# Patient Record
Sex: MALE | Race: WHITE | NOT HISPANIC OR LATINO | Employment: FULL TIME | URBAN - METROPOLITAN AREA
[De-identification: names, ages, dates, MRNs, and addresses within clinical notes are randomized per-mention and may not be internally consistent; named-entity substitution may affect disease eponyms.]

---

## 2017-07-26 ENCOUNTER — APPOINTMENT (OUTPATIENT)
Dept: LAB | Facility: MEDICAL CENTER | Age: 62
End: 2017-07-26
Payer: COMMERCIAL

## 2017-07-26 ENCOUNTER — TRANSCRIBE ORDERS (OUTPATIENT)
Dept: ADMINISTRATIVE | Facility: HOSPITAL | Age: 62
End: 2017-07-26

## 2017-07-26 DIAGNOSIS — C61 MALIGNANT NEOPLASM OF PROSTATE (HCC): ICD-10-CM

## 2017-07-26 LAB — PSA SERPL-MCNC: 3.4 NG/ML (ref 0–4)

## 2017-07-26 PROCEDURE — 84153 ASSAY OF PSA TOTAL: CPT

## 2017-07-26 PROCEDURE — 36415 COLL VENOUS BLD VENIPUNCTURE: CPT

## 2017-07-28 ENCOUNTER — ALLSCRIPTS OFFICE VISIT (OUTPATIENT)
Dept: OTHER | Facility: OTHER | Age: 62
End: 2017-07-28

## 2017-07-28 ENCOUNTER — GENERIC CONVERSION - ENCOUNTER (OUTPATIENT)
Dept: OTHER | Facility: OTHER | Age: 62
End: 2017-07-28

## 2017-07-28 LAB
BILIRUB UR QL STRIP: NEGATIVE
CLARITY UR: NORMAL
COLOR UR: YELLOW
GLUCOSE (HISTORICAL): NEGATIVE
HGB UR QL STRIP.AUTO: NEGATIVE
KETONES UR STRIP-MCNC: NEGATIVE MG/DL
LEUKOCYTE ESTERASE UR QL STRIP: NEGATIVE
NITRITE UR QL STRIP: NEGATIVE
PH UR STRIP.AUTO: 5 [PH]
PROT UR STRIP-MCNC: NEGATIVE MG/DL
SP GR UR STRIP.AUTO: 1.02
UROBILINOGEN UR QL STRIP.AUTO: 0.2

## 2018-01-15 VITALS
HEIGHT: 74 IN | BODY MASS INDEX: 30.03 KG/M2 | WEIGHT: 234 LBS | DIASTOLIC BLOOD PRESSURE: 84 MMHG | SYSTOLIC BLOOD PRESSURE: 122 MMHG

## 2018-01-17 NOTE — RESULT NOTES
Verified Results  (1) PSA, DIAGNOSTIC (FOLLOW-UP) [id number] 05:06PM Krystle Rosen Order Number: [id number]     Test Name Result Flag Reference   PSA 3 4 ng/mL  0 0-4 0   American [employer] Association Guidelines define biochemical recurrence of prostate cancer as a detectable or rising PSA value post-radical prostatectomy that is greater than or equal to 0 2 ng/mL with a second confirmatory level of greater than or equal to 0 2 ng/mL

## 2018-07-28 DIAGNOSIS — C61 MALIGNANT NEOPLASM OF PROSTATE (HCC): ICD-10-CM

## 2018-07-30 DIAGNOSIS — C61 MALIGNANT NEOPLASM OF PROSTATE (HCC): Primary | ICD-10-CM

## 2018-08-03 ENCOUNTER — OFFICE VISIT (OUTPATIENT)
Dept: UROLOGY | Facility: MEDICAL CENTER | Age: 63
End: 2018-08-03
Payer: COMMERCIAL

## 2018-08-03 VITALS
HEIGHT: 74 IN | WEIGHT: 234 LBS | SYSTOLIC BLOOD PRESSURE: 122 MMHG | DIASTOLIC BLOOD PRESSURE: 78 MMHG | BODY MASS INDEX: 30.03 KG/M2

## 2018-08-03 DIAGNOSIS — Z85.46 HISTORY OF MALIGNANT NEOPLASM OF PROSTATE: ICD-10-CM

## 2018-08-03 DIAGNOSIS — C61 MALIGNANT NEOPLASM OF PROSTATE (HCC): Primary | ICD-10-CM

## 2018-08-03 LAB
SL AMB  POCT GLUCOSE, UA: NEGATIVE
SL AMB LEUKOCYTE ESTERASE,UA: NEGATIVE
SL AMB POCT BILIRUBIN,UA: NEGATIVE
SL AMB POCT BLOOD,UA: NEGATIVE
SL AMB POCT CLARITY,UA: CLEAR
SL AMB POCT COLOR,UA: YELLOW
SL AMB POCT KETONES,UA: NEGATIVE
SL AMB POCT NITRITE,UA: NEGATIVE
SL AMB POCT PH,UA: 6.5
SL AMB POCT SPECIFIC GRAVITY,UA: 1.02
SL AMB POCT URINE PROTEIN: NEGATIVE
SL AMB POCT UROBILINOGEN: 0.2

## 2018-08-03 PROCEDURE — 99213 OFFICE O/P EST LOW 20 MIN: CPT | Performed by: UROLOGY

## 2018-08-03 PROCEDURE — 81003 URINALYSIS AUTO W/O SCOPE: CPT | Performed by: UROLOGY

## 2018-08-03 RX ORDER — OMEGA-3 FATTY ACIDS CAP DELAYED RELEASE 1000 MG 1000 MG
CAPSULE DELAYED RELEASE ORAL DAILY
COMMUNITY
End: 2019-06-13

## 2018-08-03 RX ORDER — LISINOPRIL 5 MG/1
TABLET ORAL DAILY
COMMUNITY
Start: 2018-05-15 | End: 2018-11-09 | Stop reason: ALTCHOICE

## 2018-08-03 RX ORDER — LORATADINE 10 MG/1
10 TABLET ORAL DAILY
COMMUNITY

## 2018-08-03 RX ORDER — IMIQUIMOD 12.5 MG/.25G
CREAM TOPICAL DAILY
COMMUNITY
Start: 2018-07-09 | End: 2018-11-09 | Stop reason: ALTCHOICE

## 2018-08-03 RX ORDER — LORATADINE 10 MG
TABLET ORAL DAILY
COMMUNITY
Start: 2018-05-15 | End: 2021-06-09

## 2018-08-03 RX ORDER — EVOLOCUMAB 140 MG/ML
INJECTION, SOLUTION SUBCUTANEOUS
COMMUNITY
Start: 2018-08-02 | End: 2019-10-10 | Stop reason: SDUPTHER

## 2018-08-03 RX ORDER — DOXAZOSIN 8 MG/1
TABLET ORAL DAILY
COMMUNITY
Start: 2018-05-20 | End: 2018-08-24 | Stop reason: SDUPTHER

## 2018-08-03 RX ORDER — TRAMADOL HYDROCHLORIDE 50 MG/1
TABLET ORAL AS NEEDED
COMMUNITY
Start: 2017-10-19 | End: 2018-09-07 | Stop reason: SDUPTHER

## 2018-08-03 RX ORDER — BIOTIN 1 MG
TABLET ORAL DAILY
COMMUNITY
End: 2019-10-24

## 2018-08-03 RX ORDER — CLOPIDOGREL BISULFATE 75 MG/1
TABLET ORAL DAILY
COMMUNITY
Start: 2018-05-15 | End: 2018-11-09 | Stop reason: ALTCHOICE

## 2018-08-03 NOTE — PROGRESS NOTES
Assessment/Plan:    Malignant neoplasm of prostate ([email or web address] 75 )   Since the patient has never been treated, we must assume that his prostate cancer still  present although it is inactive  We will continue annual monitoring of his symptoms for recurrent cancer as well as the need for alpha blockers  Diagnoses and all orders for this visit:    Malignant neoplasm of prostate ([email or web address] 75 )  -     PSA, Total Screen; Future    History of malignant neoplasm of prostate  -     POCT urine dip auto non-scope    Other orders  -     [employer] ASPIRIN ADULT LOW DOSE 81 MG chewable tablet; daily  -     Cholecalciferol (VITAMIN D3) 1000 units CAPS; Take by mouth daily  -     clopidogrel (PLAVIX) 75 mg tablet; daily  -     doxazosin (CARDURA) 8 MG tablet; daily  -     REPATHA SURECLICK 817 MG/ML SOAJ; Twice a month  -     Omega-3 Fatty Acids (FISH OIL) 1000 MG CPDR; Take by mouth daily  -     imiquimod (ALDARA) 5 % cream; daily  -     lisinopril (ZESTRIL) 5 mg tablet; daily  -     loratadine (CLARITIN) 10 mg tablet; Take 10 mg by mouth daily  -     metoprolol tartrate (LOPRESSOR) 25 mg tablet; daily  -     traMADol (ULTRAM) 50 mg tablet; as needed          Subjective:      Patient ID: Mikie Chen is a 70 y o  male  HPI   Prostate cancer: The patient has been on active surveillance since 2010  A prostate biopsy then showed a [place] 6 carcinoma in 10% of 1 core  He had prostate biopsies twice after that, an all samples were negative  His current PSA is 3 7  His PSA has varied from a low of 3 0 in 2014 to a high of 4 8 in 2012  There has been no trend either up or down  He notes nocturia x 1  He denies other significant urinary symptoms  He denies gross hematuria, urinary tract infections or incontinence  He is taking doxazosin for his symptoms  The doxazocin does not seem to be working as consistently  If he misses a dose, he notes a dramatic increase in  sx        The following portions of the patient's history were reviewed and updated as appropriate: allergies, current medications, past family history, past medical history, past social history, past surgical history and problem list     Review of Systems   Constitutional: Negative for activity change and fatigue  Respiratory: Negative for shortness of breath and wheezing  Hx of pulmonary emboli following hernia surgery  Cardiovascular: Negative for chest pain  MI in 2014  Taking clopidogrel and ASA  Gastrointestinal: Negative for abdominal pain  Genitourinary: Negative for difficulty urinating, dysuria, frequency, hematuria and urgency  Musculoskeletal: Negative for back pain and gait problem  Skin: Negative  Allergic/Immunologic: Negative  Neurological: Negative  Psychiatric/Behavioral: Negative  Objective:      /78 (BP Location: Left arm, Patient Position: Sitting, Cuff Size: Standard)   Ht 6' 2" (1 88 m)   Wt 106 kg (234 lb)   BMI 30 04 kg/m²          Physical Exam   Constitutional: He is oriented to person, place, and time  He appears well-developed and well-nourished  HENT:   Head: Normocephalic and atraumatic  Eyes: EOM are normal    Neck: Normal range of motion  Neck supple  Pulmonary/Chest: Effort normal    Genitourinary: Rectum normal    Genitourinary Comments: The prostate is 30 gm, firm, smooth, non-tender  Musculoskeletal: Normal range of motion  Neurological: He is alert and oriented to person, place, and time  Skin: Skin is warm and dry  Psychiatric: He has a normal mood and affect   His behavior is normal  Judgment and thought content normal

## 2018-08-03 NOTE — PROGRESS NOTES
IPSS Questionnaire (AUA-7): Over the past month    1)  How often have you had a sensation of not emptying your bladder completely after you finish urinating? 0 - Not at all   2)  How often have you had to urinate again less than two hours after you finished urinating? 0 - Not at all   3)  How often have you found you stopped and started again several times when you urinated? 0 - Not at all   4) How difficult have you found it to postpone urination? 1 - Less than 1 time in 5   5) How often have you had a weak urinary stream?  0 - Not at all   6) How often have you had to push or strain to begin urination? 0 - Not at all   7) How many times did you most typically get up to urinate from the time you went to bed until the time you got up in the morning? 1 - 1 time   Total Score:  2     QOL: Pleased

## 2018-08-03 NOTE — LETTER
August 4, 2018     Barry Esparza MD  [address]  [place] [address]    Patient: Yaya Rodriguez   YOB: 1955   Date of Visit: 8/3/2018       Dear Dr Kerri Wilkinson:    Thank you for referring Yaya Rodriguez to me for evaluation  Below are my notes for this consultation  If you have questions, please do not hesitate to call me  I look forward to following your patient along with you  Sincerely,        Colton Rodriguez MD        CC: No Recipients  Colton Rodriguez MD  8/4/2018 11:22 AM  Sign at close encounter  Assessment/Plan:    Malignant neoplasm of prostate [facility])   Since the patient has never been treated, we must assume that his prostate cancer still  present although it is inactive  We will continue annual monitoring of his symptoms for recurrent cancer as well as the need for alpha blockers  Diagnoses and all orders for this visit:    Malignant neoplasm of prostate ([facility] [address] )  -     PSA, Total Screen; Future    History of malignant neoplasm of prostate  -     POCT urine dip auto non-scope    Other orders  -     [employer]S ASPIRIN ADULT LOW DOSE 81 MG chewable tablet; daily  -     Cholecalciferol (VITAMIN D3) 1000 units CAPS; Take by mouth daily  -     clopidogrel (PLAVIX) 75 mg tablet; daily  -     doxazosin (CARDURA) 8 MG tablet; daily  -     REPATHA SURECLICK 034 MG/ML SOAJ; Twice a month  -     Omega-3 Fatty Acids (FISH OIL) 1000 MG CPDR; Take by mouth daily  -     imiquimod (ALDARA) 5 % cream; daily  -     lisinopril (ZESTRIL) 5 mg tablet; daily  -     loratadine (CLARITIN) 10 mg tablet; Take 10 mg by mouth daily  -     metoprolol tartrate (LOPRESSOR) 25 mg tablet; daily  -     traMADol (ULTRAM) 50 mg tablet; as needed          Subjective:      Patient ID: Yaya Rodriguez is a 70 y o  male  HPI   Prostate cancer: The patient has been on active surveillance since 2010  A prostate biopsy then showed a [place] 6 carcinoma in 10% of 1 core     He had prostate biopsies twice after that, an all samples were negative  His current PSA is 3 7  His PSA has varied from a low of 3 0 in 2014 to a high of 4 8 in 2012  There has been no trend either up or down  He notes nocturia x 1  He denies other significant urinary symptoms  He denies gross hematuria, urinary tract infections or incontinence  He is taking doxazosin for his symptoms  The doxazocin does not seem to be working as consistently  If he misses a dose, he notes a dramatic increase in  sx  The following portions of the patient's history were reviewed and updated as appropriate: allergies, current medications, past family history, past medical history, past social history, past surgical history and problem list     Review of Systems   Constitutional: Negative for activity change and fatigue  Respiratory: Negative for shortness of breath and wheezing  Hx of pulmonary emboli following hernia surgery  Cardiovascular: Negative for chest pain  MI in 2014  Taking clopidogrel and ASA  Gastrointestinal: Negative for abdominal pain  Genitourinary: Negative for difficulty urinating, dysuria, frequency, hematuria and urgency  Musculoskeletal: Negative for back pain and gait problem  Skin: Negative  Allergic/Immunologic: Negative  Neurological: Negative  Psychiatric/Behavioral: Negative  Objective:      /78 (BP Location: Left arm, Patient Position: Sitting, Cuff Size: Standard)   Ht 6' 2" (1 88 m)   Wt 106 kg (234 lb)   BMI 30 04 kg/m²           Physical Exam   Constitutional: He is oriented to person, place, and time  He appears well-developed and well-nourished  HENT:   Head: Normocephalic and atraumatic  Eyes: EOM are normal    Neck: Normal range of motion  Neck supple  Pulmonary/Chest: Effort normal    Genitourinary: Rectum normal    Genitourinary Comments: The prostate is 30 gm, firm, smooth, non-tender     Musculoskeletal: Normal range of motion  Neurological: He is alert and oriented to person, place, and time  Skin: Skin is warm and dry  Psychiatric: He has a normal mood and affect   His behavior is normal  Judgment and thought content normal

## 2018-08-04 PROBLEM — C61 MALIGNANT NEOPLASM OF PROSTATE (HCC): Status: ACTIVE | Noted: 2018-08-04

## 2018-08-04 NOTE — ASSESSMENT & PLAN NOTE
Since the patient has never been treated, we must assume that his prostate cancer still  present although it is inactive  We will continue annual monitoring of his symptoms for recurrent cancer as well as the need for alpha blockers

## 2018-08-24 DIAGNOSIS — N40.0 PROSTATE ENLARGEMENT: Primary | ICD-10-CM

## 2018-08-24 RX ORDER — DOXAZOSIN 8 MG/1
8 TABLET ORAL DAILY
Qty: 8 TABLET | Refills: 0 | Status: SHIPPED | OUTPATIENT
Start: 2018-08-24 | End: 2018-09-07 | Stop reason: SDUPTHER

## 2018-08-24 NOTE — TELEPHONE ENCOUNTER
Patient states he has run out of medication and he would like medication refills for Doxazosin 8 mg Patient states he needs, it less 8 tables since his medication are being ship by Mailed order and it will take a week for him to received medication

## 2018-09-07 ENCOUNTER — OFFICE VISIT (OUTPATIENT)
Dept: FAMILY MEDICINE CLINIC | Facility: CLINIC | Age: 63
End: 2018-09-07
Payer: COMMERCIAL

## 2018-09-07 VITALS
HEIGHT: 74 IN | DIASTOLIC BLOOD PRESSURE: 62 MMHG | HEART RATE: 74 BPM | WEIGHT: 233 LBS | TEMPERATURE: 98.9 F | SYSTOLIC BLOOD PRESSURE: 118 MMHG | BODY MASS INDEX: 29.9 KG/M2 | RESPIRATION RATE: 18 BRPM | OXYGEN SATURATION: 94 %

## 2018-09-07 DIAGNOSIS — Z98.61 STATUS POST PERCUTANEOUS TRANSLUMINAL CORONARY ANGIOPLASTY: ICD-10-CM

## 2018-09-07 DIAGNOSIS — I21.02 ACUTE ST ELEVATION MYOCARDIAL INFARCTION (STEMI) INVOLVING LEFT ANTERIOR DESCENDING (LAD) CORONARY ARTERY (HCC): Primary | ICD-10-CM

## 2018-09-07 DIAGNOSIS — C61 ADENOCARCINOMA OF PROSTATE (HCC): ICD-10-CM

## 2018-09-07 DIAGNOSIS — M12.9 ARTHROPATHY: ICD-10-CM

## 2018-09-07 DIAGNOSIS — N40.0 PROSTATE ENLARGEMENT: ICD-10-CM

## 2018-09-07 DIAGNOSIS — M1A.0690 IDIOPATHIC CHRONIC GOUT OF KNEE WITHOUT TOPHUS, UNSPECIFIED LATERALITY: ICD-10-CM

## 2018-09-07 DIAGNOSIS — Z86.711 HX PULMONARY EMBOLISM: ICD-10-CM

## 2018-09-07 DIAGNOSIS — R73.09 ABNORMAL GLUCOSE LEVEL: ICD-10-CM

## 2018-09-07 DIAGNOSIS — E78.5 HYPERLIPIDEMIA, UNSPECIFIED HYPERLIPIDEMIA TYPE: ICD-10-CM

## 2018-09-07 DIAGNOSIS — I26.99 PULMONARY EMBOLISM WITH INFARCTION (HCC): ICD-10-CM

## 2018-09-07 DIAGNOSIS — I10 ESSENTIAL HYPERTENSION: ICD-10-CM

## 2018-09-07 DIAGNOSIS — G89.29 OTHER CHRONIC PAIN: ICD-10-CM

## 2018-09-07 DIAGNOSIS — I10 HYPERTENSION, UNSPECIFIED TYPE: ICD-10-CM

## 2018-09-07 PROCEDURE — 99214 OFFICE O/P EST MOD 30 MIN: CPT | Performed by: INTERNAL MEDICINE

## 2018-09-07 PROCEDURE — 3074F SYST BP LT 130 MM HG: CPT | Performed by: INTERNAL MEDICINE

## 2018-09-07 PROCEDURE — 3078F DIAST BP <80 MM HG: CPT | Performed by: INTERNAL MEDICINE

## 2018-09-07 PROCEDURE — 3008F BODY MASS INDEX DOCD: CPT | Performed by: INTERNAL MEDICINE

## 2018-09-07 RX ORDER — TRAMADOL HYDROCHLORIDE 50 MG/1
50 TABLET ORAL EVERY 8 HOURS PRN
Qty: 30 TABLET | Refills: 1 | Status: SHIPPED | OUTPATIENT
Start: 2018-09-07 | End: 2018-10-07

## 2018-09-07 RX ORDER — DOXAZOSIN 8 MG/1
8 TABLET ORAL DAILY
Qty: 90 TABLET | Refills: 3 | Status: SHIPPED | OUTPATIENT
Start: 2018-09-07 | End: 2019-05-09 | Stop reason: SINTOL

## 2018-09-07 RX ORDER — AMPICILLIN TRIHYDRATE 250 MG
CAPSULE ORAL
COMMUNITY
End: 2019-06-13

## 2018-09-07 NOTE — PROGRESS NOTES
Assessment/Plan:      Diet reviewed  Lifestyle modifications reviewed  Medications reviewed and ordered  Laboratory tests and studies reviewed and ordered  All patient's questions answered to patient satisfaction  Diagnoses and all orders for this visit:    Acute ST elevation myocardial infarction (STEMI) involving left anterior descending (LAD) coronary artery (HCC)  -     CBC and differential; Future  -     Comprehensive metabolic panel; Future  -     Lipid Panel with Direct LDL reflex; Future  -     Uric acid; Future  -     C-reactive protein; Future  -     Cyclic citrul peptide antibody, IgG; Future    Hyperlipidemia, unspecified hyperlipidemia type  -     CBC and differential; Future  -     Comprehensive metabolic panel; Future  -     Lipid Panel with Direct LDL reflex; Future  -     Uric acid; Future  -     C-reactive protein; Future  -     Cyclic citrul peptide antibody, IgG; Future    Hypertension, unspecified type    Prostate enlargement  -     doxazosin (CARDURA) 8 MG tablet; Take 1 tablet (8 mg total) by mouth daily    Other chronic pain  -     traMADol (ULTRAM) 50 mg tablet; Take 1 tablet (50 mg total) by mouth every 8 (eight) hours as needed for severe pain for up to 30 days  -     CBC and differential; Future  -     Comprehensive metabolic panel; Future  -     Lipid Panel with Direct LDL reflex; Future  -     Uric acid; Future  -     C-reactive protein; Future  -     Cyclic citrul peptide antibody, IgG; Future    Hx pulmonary embolism  -     CBC and differential; Future  -     Comprehensive metabolic panel; Future  -     Lipid Panel with Direct LDL reflex; Future  -     Uric acid; Future  -     C-reactive protein; Future  -     Cyclic citrul peptide antibody, IgG; Future    Essential hypertension    Arthropathy  -     CBC and differential; Future  -     Comprehensive metabolic panel; Future  -     Lipid Panel with Direct LDL reflex; Future  -     Uric acid;  Future  -     C-reactive protein; Future  -     Cyclic citrul peptide antibody, IgG; Future    Status post percutaneous transluminal coronary angioplasty    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, unspecified laterality    Abnormal glucose level    Pulmonary embolism with infarction ([facility] [address] )    Adenocarcinoma of prostate ([facility] [address] )    Other orders  -     Red Yeast Rice 600 MG CAPS; Take by mouth        Subjective:      Patient ID: Cinthya Willard is a 70 y o  male  HPI  This 64-year-old male with a past history of interval my card infarction and LAD stent, hyperlipidemia with past history of statin myalgias requiring switched to repatha with excellent response, 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 possibly secondary to inflammatory arthritis less likely gout well controlled in the recent past, remote history of pulmonary embolism not on full anticoagulation, and history of low-grade adenocarcinoma of the prostate under observation without therapy by Urology with stable PSA      Current Outpatient Prescriptions:     Cholecalciferol (VITAMIN D3) 1000 units CAPS, Take by mouth daily, Disp: , Rfl:     clopidogrel (PLAVIX) 75 mg tablet, daily, Disp: , Rfl:     CVS ASPIRIN ADULT LOW DOSE 81 MG chewable tablet, daily, Disp: , Rfl:     lisinopril (ZESTRIL) 5 mg tablet, daily, Disp: , Rfl:     loratadine (CLARITIN) 10 mg tablet, Take 10 mg by mouth daily, Disp: , Rfl:     metoprolol tartrate (LOPRESSOR) 25 mg tablet, daily, Disp: , Rfl:     Omega-3 Fatty Acids (FISH OIL) 1000 MG CPDR, Take by mouth daily, Disp: , Rfl:     Red Yeast Rice 600 MG CAPS, Take by mouth, Disp: , Rfl:     REPATHA SURECLICK 241 MG/ML SOAJ, Twice a month, Disp: , Rfl:     doxazosin (CARDURA) 8 MG tablet, Take 1 tablet (8 mg total) by mouth daily, Disp: 90 tablet, Rfl: 3    imiquimod (ALDARA) 5 % cream, daily, Disp: , Rfl:     traMADol (ULTRAM) 50 mg tablet, Take 1 tablet (50 mg total) by mouth every 8 (eight) hours as needed for severe pain for up to 30 days, Disp: 30 tablet, Rfl: 1    The following portions of the patient's history were reviewed and updated as appropriate: allergies, current medications, past family history, past medical history, past social history, past surgical history and problem list     Review of Systems      Family History   Problem Relation Age of Onset    Cancer Family         Bladder cancer    Diabetes Family     Heart attack Family     Hypertension Family        Past Medical History:   Diagnosis Date    BPH with urinary obstruction     Elevated PSA     Erectile dysfunction     Gout     Hypercholesteremia     Malignant [facility] prostate [facility] [address] )     Pulmonary embolism ([facility] )     Right inguinal hernia        Past Surgical History:   Procedure Laterality Date    PROSTATE BIOPSY Bilateral 2011    TONSILLECTOMY         Social History     Social History    Marital status: /Civil Union     Spouse name: N/A    Number of children: N/A    Years of education: N/A     Social History Main Topics    Smoking status: Former Smoker     Types: Cigarettes     Quit date: 1971    Smokeless tobacco: Never Used    Alcohol use 0 6 oz/week     1 Cans of beer per week    Drug use: No    Sexual activity: Yes     Partners: Female     Birth control/ protection: None     Other Topics Concern    None     Social History Narrative    None       Allergies   Allergen Reactions    No Active Allergies          Objective:      /62   Pulse 74   Temp 98 9 °F (37 2 °C)   Resp 18   Ht 6' 2" (1 88 m)   Wt 106 kg (233 lb)   SpO2 94%   BMI 29 92 kg/m²        Physical Exam

## 2018-09-24 NOTE — TELEPHONE ENCOUNTER
Jaz Garrett requested  Inject 1 ml q 2 weeks   30 day with 11 Rf      [phone] Phone  Direct extention: [phone]

## 2018-09-26 NOTE — TELEPHONE ENCOUNTER
Medication called in per Dr Carine Harley instructions  LM for Pt letting him know medication was renewed

## 2018-10-19 LAB
ALBUMIN SERPL-MCNC: 4.8 G/DL (ref 3.6–5.1)
ALBUMIN/GLOB SERPL: 1.8 (CALC) (ref 1–2.5)
ALP SERPL-CCNC: 61 U/L (ref 40–115)
ALT SERPL-CCNC: 13 U/L (ref 9–46)
AST SERPL-CCNC: 17 U/L (ref 10–35)
BASOPHILS # BLD AUTO: 68 CELLS/UL (ref 0–200)
BASOPHILS NFR BLD AUTO: 0.8 %
BILIRUB SERPL-MCNC: 0.7 MG/DL (ref 0.2–1.2)
BUN SERPL-MCNC: 17 MG/DL (ref 7–25)
BUN/CREAT SERPL: ABNORMAL (CALC) (ref 6–22)
CALCIUM SERPL-MCNC: 9.9 MG/DL (ref 8.6–10.3)
CCP IGG SERPL-ACNC: <16 UNITS
CHLORIDE SERPL-SCNC: 105 MMOL/L (ref 98–110)
CHOLEST SERPL-MCNC: 139 MG/DL
CHOLEST/HDLC SERPL: 3.1 (CALC)
CO2 SERPL-SCNC: 27 MMOL/L (ref 20–32)
CREAT SERPL-MCNC: 0.97 MG/DL (ref 0.7–1.25)
CRP SERPL-MCNC: 2.3 MG/L
EOSINOPHIL # BLD AUTO: 136 CELLS/UL (ref 15–500)
EOSINOPHIL NFR BLD AUTO: 1.6 %
ERYTHROCYTE [DISTWIDTH] IN BLOOD BY AUTOMATED COUNT: 13.1 % (ref 11–15)
GLOBULIN SER CALC-MCNC: 2.6 G/DL (CALC) (ref 1.9–3.7)
GLUCOSE SERPL-MCNC: 104 MG/DL (ref 65–99)
HCT VFR BLD AUTO: 44.2 % (ref 38.5–50)
HDLC SERPL-MCNC: 45 MG/DL
HGB BLD-MCNC: 15 G/DL (ref 13.2–17.1)
LDLC SERPL CALC-MCNC: 68 MG/DL (CALC)
LYMPHOCYTES # BLD AUTO: 2678 CELLS/UL (ref 850–3900)
LYMPHOCYTES NFR BLD AUTO: 31.5 %
MCH RBC QN AUTO: 30.5 PG (ref 27–33)
MCHC RBC AUTO-ENTMCNC: 33.9 G/DL (ref 32–36)
MCV RBC AUTO: 89.8 FL (ref 80–100)
MONOCYTES # BLD AUTO: 748 CELLS/UL (ref 200–950)
MONOCYTES NFR BLD AUTO: 8.8 %
NEUTROPHILS # BLD AUTO: 4871 CELLS/UL (ref 1500–7800)
NEUTROPHILS NFR BLD AUTO: 57.3 %
NONHDLC SERPL-MCNC: 94 MG/DL (CALC)
PLATELET # BLD AUTO: 283 THOUSAND/UL (ref 140–400)
PMV BLD REES-ECKER: 11.1 FL (ref 7.5–12.5)
POTASSIUM SERPL-SCNC: 4.5 MMOL/L (ref 3.5–5.3)
PROT SERPL-MCNC: 7.4 G/DL (ref 6.1–8.1)
RBC # BLD AUTO: 4.92 MILLION/UL (ref 4.2–5.8)
SL AMB EGFR AFRICAN AMERICAN: 95 ML/MIN/1.73M2
SL AMB EGFR NON AFRICAN AMERICAN: 82 ML/MIN/1.73M2
SODIUM SERPL-SCNC: 139 MMOL/L (ref 135–146)
TRIGL SERPL-MCNC: 193 MG/DL
URATE SERPL-MCNC: 7.1 MG/DL (ref 4–8)
WBC # BLD AUTO: 8.5 THOUSAND/UL (ref 3.8–10.8)

## 2018-11-09 ENCOUNTER — OFFICE VISIT (OUTPATIENT)
Dept: FAMILY MEDICINE CLINIC | Facility: CLINIC | Age: 63
End: 2018-11-09
Payer: COMMERCIAL

## 2018-11-09 VITALS
BODY MASS INDEX: 31.59 KG/M2 | WEIGHT: 233.2 LBS | OXYGEN SATURATION: 98 % | DIASTOLIC BLOOD PRESSURE: 74 MMHG | SYSTOLIC BLOOD PRESSURE: 118 MMHG | HEIGHT: 72 IN | RESPIRATION RATE: 18 BRPM | TEMPERATURE: 96.4 F | HEART RATE: 84 BPM

## 2018-11-09 DIAGNOSIS — G72.2 TOXIC MYOPATHY: ICD-10-CM

## 2018-11-09 DIAGNOSIS — Z23 ENCOUNTER FOR IMMUNIZATION: ICD-10-CM

## 2018-11-09 DIAGNOSIS — I10 ESSENTIAL HYPERTENSION: ICD-10-CM

## 2018-11-09 DIAGNOSIS — K65.4 SCLEROSING MESENTERITIS (HCC): ICD-10-CM

## 2018-11-09 DIAGNOSIS — I25.10 CORONARY ARTERY DISEASE INVOLVING NATIVE CORONARY ARTERY OF NATIVE HEART WITHOUT ANGINA PECTORIS: ICD-10-CM

## 2018-11-09 DIAGNOSIS — R73.09 ABNORMAL GLUCOSE LEVEL: ICD-10-CM

## 2018-11-09 DIAGNOSIS — I26.99 PULMONARY EMBOLISM WITH INFARCTION (HCC): ICD-10-CM

## 2018-11-09 DIAGNOSIS — I21.02 ACUTE ST ELEVATION MYOCARDIAL INFARCTION (STEMI) INVOLVING LEFT ANTERIOR DESCENDING (LAD) CORONARY ARTERY (HCC): Primary | ICD-10-CM

## 2018-11-09 DIAGNOSIS — C61 ADENOCARCINOMA OF PROSTATE (HCC): ICD-10-CM

## 2018-11-09 DIAGNOSIS — M12.9 ARTHROPATHY: ICD-10-CM

## 2018-11-09 DIAGNOSIS — Z86.711 HX PULMONARY EMBOLISM: ICD-10-CM

## 2018-11-09 DIAGNOSIS — E78.5 HYPERLIPIDEMIA, UNSPECIFIED HYPERLIPIDEMIA TYPE: ICD-10-CM

## 2018-11-09 DIAGNOSIS — Z98.61 STATUS POST PERCUTANEOUS TRANSLUMINAL CORONARY ANGIOPLASTY: ICD-10-CM

## 2018-11-09 DIAGNOSIS — G89.29 OTHER CHRONIC PAIN: ICD-10-CM

## 2018-11-09 PROCEDURE — 90686 IIV4 VACC NO PRSV 0.5 ML IM: CPT

## 2018-11-09 PROCEDURE — 1036F TOBACCO NON-USER: CPT | Performed by: INTERNAL MEDICINE

## 2018-11-09 PROCEDURE — 3074F SYST BP LT 130 MM HG: CPT | Performed by: INTERNAL MEDICINE

## 2018-11-09 PROCEDURE — 90471 IMMUNIZATION ADMIN: CPT

## 2018-11-09 PROCEDURE — 3078F DIAST BP <80 MM HG: CPT | Performed by: INTERNAL MEDICINE

## 2018-11-09 PROCEDURE — 99214 OFFICE O/P EST MOD 30 MIN: CPT | Performed by: INTERNAL MEDICINE

## 2018-11-09 PROCEDURE — 3008F BODY MASS INDEX DOCD: CPT | Performed by: INTERNAL MEDICINE

## 2018-11-09 RX ORDER — CLOPIDOGREL BISULFATE 75 MG/1
75 TABLET ORAL
COMMUNITY
Start: 2018-10-09 | End: 2021-05-24 | Stop reason: SDUPTHER

## 2018-11-09 RX ORDER — TRAMADOL HYDROCHLORIDE 50 MG/1
2 TABLET ORAL AS NEEDED
COMMUNITY
Start: 2016-09-08 | End: 2020-07-28 | Stop reason: SDUPTHER

## 2018-11-09 RX ORDER — LISINOPRIL 5 MG/1
2.5 TABLET ORAL DAILY
COMMUNITY
Start: 2018-10-09

## 2018-11-09 RX ORDER — METOPROLOL SUCCINATE 25 MG/1
25 TABLET, EXTENDED RELEASE ORAL EVERY EVENING
COMMUNITY
Start: 2018-10-09

## 2018-11-09 RX ORDER — ICOSAPENT ETHYL 1000 MG/1
2 CAPSULE ORAL 2 TIMES DAILY
Qty: 360 CAPSULE | Refills: 3 | Status: SHIPPED | OUTPATIENT
Start: 2018-11-09 | End: 2019-09-10 | Stop reason: SDUPTHER

## 2018-11-09 NOTE — PROGRESS NOTES
Assessment/Plan:  Patient overall were all is doing well can work get this job continuing daily  He had a stress test with excellent results  He has no significant wall motion abnormality as residual of his past MI with LAD stent  Cholesterol panel reveals a triglycerides of 193 and a calculated LDL of 68  He will continue the rip at the 140 mg every 2 weeks  And increase the red yeast rice to 1200 mg twice daily and start the septa 2000 mg twice daily  Carcinoma of the prostate low-grade appears controlled with a PSA of 3 7 and is followed by Urology  Diet reviewed  Lifestyle modifications reviewed  Medications reviewed and ordered  Laboratory tests and studies reviewed and ordered  All patient's questions answered to patient satisfaction  Diagnoses and all orders for this visit:    Acute ST elevation myocardial infarction (STEMI) involving left anterior descending (LAD) coronary artery (HCC)  -     Icosapent Ethyl (VASCEPA) 1 g CAPS; Take 2 capsules (2 g total) by mouth 2 (two) times a day  -     CBC and differential; Future  -     Comprehensive metabolic panel; Future  -     Lipid Panel with Direct LDL reflex; Future    Hyperlipidemia, unspecified hyperlipidemia type  -     Icosapent Ethyl (VASCEPA) 1 g CAPS; Take 2 capsules (2 g total) by mouth 2 (two) times a day  -     CBC and differential; Future  -     Comprehensive metabolic panel; Future  -     Lipid Panel with Direct LDL reflex; Future    Other chronic pain    Hx pulmonary embolism    Essential hypertension    Coronary artery disease involving native coronary artery of native heart without angina pectoris  -     Icosapent Ethyl (VASCEPA) 1 g CAPS; Take 2 capsules (2 g total) by mouth 2 (two) times a day  -     CBC and differential; Future  -     Comprehensive metabolic panel; Future  -     Lipid Panel with Direct LDL reflex;  Future    Pulmonary embolism with infarction [facility])    Arthropathy    Toxic myopathy    Adenocarcinoma of prostate ([facility] )    Abnormal glucose level    Sclerosing mesenteritis ([facility] )    Status post percutaneous transluminal coronary angioplasty    Encounter for immunization  -     influenza vaccine, [id number], quadrivalent, 0 5 mL, preservative-free (SYRINGE, SINGLE-DOSE VIAL), for adult and pediatric patients 3 yr+ (AFLURIA, FLUARIX, FLULAVAL, FLUZONE)    Other orders  -     metoprolol succinate (TOPROL-XL) 25 mg 24 hr tablet; Take 25 mg by mouth  -     traMADol (ULTRAM) 50 mg tablet; Take 2 tablets by mouth every 8 (eight) hours as needed  -     clopidogrel (PLAVIX) 75 mg tablet; Take 75 mg by mouth  -     lisinopril (ZESTRIL) 5 mg tablet; Take 5 mg by mouth        Subjective:      Patient ID: Atiya Bertrand is a 70 y o  male  HPI  This 57-year-old male is seen for the following:    Past acute ST segment elevation myocardial infarction involving left anterior descending coronary artery with stenting  Patient has had an echo that demonstrates ejection fraction 60% with no significant residual wall motion abnormality  Stress test done last summer demonstrated no evidence of ischemia  Hyperlipidemia patient is on rip at the 140 mg every 2 weeks red yeast rice 600 twice daily and over-the-counter fish oil a 1000 mg twice daily  Patient would like to increase his red yeast rice 2-1200 mg twice daily and take prescription the septa fish oil 2000 twice daily to optimize his cholesterol panel  Current diagnosis of low-grade adenocarcinoma of the prostate which is being monitored his last PSA was 3 7 and is considered a stable condition  Pre diabetes patient's fasting blood sugar is 104  He controls this with diet  The patient will have a flu shot today      Current Outpatient Prescriptions:     Cholecalciferol (VITAMIN D3) 1000 units CAPS, Take by mouth daily, Disp: , Rfl:     clopidogrel (PLAVIX) 75 mg tablet, Take 75 mg by mouth, Disp: , Rfl:     [facility] ASPIRIN ADULT LOW DOSE 81 MG chewable tablet, daily, Disp: , Rfl:     doxazosin (CARDURA) 8 MG tablet, Take 1 tablet (8 mg total) by mouth daily, Disp: 90 tablet, Rfl: 3    lisinopril (ZESTRIL) 5 mg tablet, Take 5 mg by mouth, Disp: , Rfl:     loratadine (CLARITIN) 10 mg tablet, Take 10 mg by mouth daily, Disp: , Rfl:     metoprolol succinate (TOPROL-XL) 25 mg 24 hr tablet, Take 25 mg by mouth, Disp: , Rfl:     Omega-3 Fatty Acids (FISH OIL) 1000 MG CPDR, Take by mouth daily, Disp: , Rfl:     Red Yeast Rice 600 MG CAPS, Take by mouth, Disp: , Rfl:     REPATHA SURECLICK 807 MG/ML SOAJ, Twice a month, Disp: , Rfl:     traMADol (ULTRAM) 50 mg tablet, Take 2 tablets by mouth every 8 (eight) hours as needed, Disp: , Rfl:     Icosapent Ethyl (VASCEPA) 1 g CAPS, Take 2 capsules (2 g total) by mouth 2 (two) times a day, Disp: 360 capsule, Rfl: 3    The following portions of the patient's history were reviewed and updated as appropriate: allergies, current medications, past family history, past medical history, past social history, past surgical history and problem list     Review of Systems   Constitutional: Negative for appetite change, fatigue, fever and unexpected weight change  HENT: Negative for rhinorrhea, sinus pain, sinus pressure, sneezing and sore throat  Eyes: Negative for visual disturbance  Respiratory: Negative for cough, chest tightness, shortness of breath and wheezing  Cardiovascular: Negative for chest pain, palpitations and leg swelling  Gastrointestinal: Negative for abdominal distention, abdominal pain, blood in stool, constipation, diarrhea, nausea and vomiting  Endocrine: Negative for polydipsia and polyuria  Genitourinary: Negative for decreased urine volume, difficulty urinating, dysuria, hematuria and urgency  Musculoskeletal: Negative for arthralgias, back pain, joint swelling and neck pain  Skin: Negative for rash  Allergic/Immunologic: Negative for environmental allergies     Neurological: Negative for tremors, weakness, light-headedness, numbness and headaches  Hematological: Does not bruise/bleed easily  Psychiatric/Behavioral: Negative for agitation, behavioral problems, confusion and dysphoric mood  The patient is not nervous/anxious  Family History   Problem Relation Age of Onset   Brandi Nine Cancer Family         Bladder cancer    Diabetes Family     Heart attack Family     Hypertension Family        Past Medical History:   Diagnosis Date    BPH with urinary obstruction     Elevated PSA     Erectile dysfunction     Gout     Hypercholesteremia     Malignant neoplasm prostate ([address] )     [facility] embol[facility]     Right inguinal hernia        Past Surgical History:   Procedure Laterality Date    PROSTATE BIOPSY Bilateral 2011    TONSILLECTOMY         Social History     Social History    Marital status: /Civil Union     Spouse name: N/A    Number of children: N/A    Years of education: N/A     Occupational History    manager      Social History Main Topics    Smoking status: Former Smoker     Types: Cigarettes     Quit date: 1971    Smokeless tobacco: Never Used    Alcohol use 0 6 oz/week     1 Cans of beer per week      Comment: rarely    Drug use: No    Sexual activity: Yes     Partners: Female     Birth control/ protection: None     Other Topics Concern    None     Social History Narrative    None       Allergies   Allergen Reactions    No Active Allergies          Objective:      /74 (BP Location: Right arm, Patient Position: Sitting, Cuff Size: Large)   Pulse 84   Temp (!) 96 4 °F (35 8 °C) (Tympanic)   Resp 18   Ht 6' (1 829 m)   Wt 106 kg (233 lb 3 2 oz)   SpO2 98%   BMI 31 63 kg/m²        Physical Exam   Constitutional: He is oriented to person, place, and time  He appears well-developed and well-nourished  No distress  HENT:   Head: Normocephalic and atraumatic  Nose: Nose normal    Mouth/Throat: Oropharynx is clear and moist  No oropharyngeal exudate     Eyes: Pupils are equal, round, and reactive to light  Conjunctivae and EOM are normal  No scleral icterus  Neck: Normal range of motion  Neck supple  No JVD present  No tracheal deviation present  No thyromegaly present  Cardiovascular: Normal rate, regular rhythm and normal heart sounds  Exam reveals no gallop and no friction rub  No murmur heard  Pulmonary/Chest: Effort normal  No respiratory distress  He has no wheezes  He has no rales  He exhibits no tenderness  Abdominal: Soft  Bowel sounds are normal  He exhibits no distension and no mass  There is no tenderness  There is no rebound and no guarding  Musculoskeletal: Normal range of motion  He exhibits no edema or deformity  Lymphadenopathy:     He has no cervical adenopathy  Neurological: He is alert and oriented to person, place, and time  No cranial nerve deficit  Coordination normal    Skin: Skin is warm and dry  No rash noted  Psychiatric: He has a normal mood and affect   His behavior is normal  Judgment and thought content normal

## 2018-12-10 ENCOUNTER — TELEPHONE (OUTPATIENT)
Dept: FAMILY MEDICINE CLINIC | Facility: CLINIC | Age: 63
End: 2018-12-10

## 2018-12-10 NOTE — TELEPHONE ENCOUNTER
Patient's pharmacy called in regards to his medication Repatha Sureclick 022 mg/ml  The pharmacy needs a prior authorization for this medication  Please advise the pharmacy

## 2018-12-31 NOTE — TELEPHONE ENCOUNTER
I spoke to insurance company last week and Pt informed  Pt told to call office if he doesn't hear from insurance company in the next few days  Sample given to Pt as Pt was due for injection 5 days ago

## 2019-01-11 ENCOUNTER — TELEPHONE (OUTPATIENT)
Dept: FAMILY MEDICINE CLINIC | Facility: CLINIC | Age: 64
End: 2019-01-11

## 2019-01-11 NOTE — TELEPHONE ENCOUNTER
Cover my meds called and they requested auth for Repatha  Case # [id number] [facility]       This is a medication that Gardenia Meredith normally does the approval for  She usually handles all auths for Repatha  Not sure if we are doing precerts in the office for this or we are referring out? Thanks!

## 2019-01-16 NOTE — TELEPHONE ENCOUNTER
We dont refer this out  I treat all my patientslipid disorders   Make sure anirudh knows about this info when she returns please

## 2019-01-16 NOTE — TELEPHONE ENCOUNTER
Jc Monge will make sure Feli Valladares is aware of this  Do you want the patient on [facility]? Feli Valladares will be back Monday  Thanks! patient

## 2019-01-17 NOTE — TELEPHONE ENCOUNTER
Niles Frost,  Can you please handle this for this patient? Also, if you can inform me how to do these as I am not familiar with these    Thanks!!

## 2019-01-22 NOTE — TELEPHONE ENCOUNTER
I spoke to Pt, medication is all taken care of  Medication was pre-authed in 12/2018 and is good till 12/2019   Notice of approval is in Pts chart under Media

## 2019-05-08 ENCOUNTER — TELEPHONE (OUTPATIENT)
Dept: FAMILY MEDICINE CLINIC | Facility: CLINIC | Age: 64
End: 2019-05-08

## 2019-05-09 ENCOUNTER — OFFICE VISIT (OUTPATIENT)
Dept: FAMILY MEDICINE CLINIC | Facility: CLINIC | Age: 64
End: 2019-05-09
Payer: COMMERCIAL

## 2019-05-09 VITALS
DIASTOLIC BLOOD PRESSURE: 84 MMHG | RESPIRATION RATE: 16 BRPM | HEIGHT: 72 IN | OXYGEN SATURATION: 98 % | HEART RATE: 74 BPM | TEMPERATURE: 97.9 F | WEIGHT: 238 LBS | BODY MASS INDEX: 32.23 KG/M2 | SYSTOLIC BLOOD PRESSURE: 110 MMHG

## 2019-05-09 DIAGNOSIS — I25.10 CORONARY ARTERY DISEASE INVOLVING NATIVE CORONARY ARTERY OF NATIVE HEART WITHOUT ANGINA PECTORIS: ICD-10-CM

## 2019-05-09 DIAGNOSIS — N40.0 PROSTATE ENLARGEMENT: ICD-10-CM

## 2019-05-09 DIAGNOSIS — E78.5 HYPERLIPIDEMIA, UNSPECIFIED HYPERLIPIDEMIA TYPE: ICD-10-CM

## 2019-05-09 DIAGNOSIS — Z11.59 NEED FOR HEPATITIS C SCREENING TEST: ICD-10-CM

## 2019-05-09 DIAGNOSIS — I21.02 ACUTE ST ELEVATION MYOCARDIAL INFARCTION (STEMI) INVOLVING LEFT ANTERIOR DESCENDING (LAD) CORONARY ARTERY (HCC): ICD-10-CM

## 2019-05-09 DIAGNOSIS — I95.1 HYPOTENSION, POSTURAL: Primary | ICD-10-CM

## 2019-05-09 DIAGNOSIS — Z23 ENCOUNTER FOR VACCINATION: ICD-10-CM

## 2019-05-09 PROCEDURE — 1101F PT FALLS ASSESS-DOCD LE1/YR: CPT | Performed by: INTERNAL MEDICINE

## 2019-05-09 PROCEDURE — 99214 OFFICE O/P EST MOD 30 MIN: CPT | Performed by: INTERNAL MEDICINE

## 2019-05-09 PROCEDURE — 93000 ELECTROCARDIOGRAM COMPLETE: CPT | Performed by: INTERNAL MEDICINE

## 2019-05-09 RX ORDER — TAMSULOSIN HYDROCHLORIDE 0.4 MG/1
0.4 CAPSULE ORAL
Qty: 90 CAPSULE | Refills: 3 | Status: SHIPPED | OUTPATIENT
Start: 2019-05-09 | End: 2019-06-13

## 2019-05-11 LAB
ALBUMIN SERPL-MCNC: 4.6 G/DL (ref 3.6–5.1)
ALBUMIN/GLOB SERPL: 1.9 (CALC) (ref 1–2.5)
ALP SERPL-CCNC: 48 U/L (ref 40–115)
ALT SERPL-CCNC: 17 U/L (ref 9–46)
AST SERPL-CCNC: 17 U/L (ref 10–35)
BASOPHILS # BLD AUTO: 53 CELLS/UL (ref 0–200)
BASOPHILS NFR BLD AUTO: 0.7 %
BILIRUB SERPL-MCNC: 0.6 MG/DL (ref 0.2–1.2)
BUN SERPL-MCNC: 19 MG/DL (ref 7–25)
BUN/CREAT SERPL: ABNORMAL (CALC) (ref 6–22)
CALCIUM SERPL-MCNC: 9.8 MG/DL (ref 8.6–10.3)
CHLORIDE SERPL-SCNC: 108 MMOL/L (ref 98–110)
CHOLEST SERPL-MCNC: 136 MG/DL
CHOLEST/HDLC SERPL: 3 (CALC)
CO2 SERPL-SCNC: 28 MMOL/L (ref 20–32)
CREAT SERPL-MCNC: 0.98 MG/DL (ref 0.7–1.25)
EOSINOPHIL # BLD AUTO: 99 CELLS/UL (ref 15–500)
EOSINOPHIL NFR BLD AUTO: 1.3 %
ERYTHROCYTE [DISTWIDTH] IN BLOOD BY AUTOMATED COUNT: 13.3 % (ref 11–15)
GLOBULIN SER CALC-MCNC: 2.4 G/DL (CALC) (ref 1.9–3.7)
GLUCOSE SERPL-MCNC: 105 MG/DL (ref 65–99)
HCT VFR BLD AUTO: 43.9 % (ref 38.5–50)
HDLC SERPL-MCNC: 46 MG/DL
HGB BLD-MCNC: 14.7 G/DL (ref 13.2–17.1)
LDLC SERPL CALC-MCNC: 69 MG/DL (CALC)
LDLC SERPL DIRECT ASSAY-MCNC: 71 MG/DL
LYMPHOCYTES # BLD AUTO: 2660 CELLS/UL (ref 850–3900)
LYMPHOCYTES NFR BLD AUTO: 35 %
MCH RBC QN AUTO: 30.5 PG (ref 27–33)
MCHC RBC AUTO-ENTMCNC: 33.5 G/DL (ref 32–36)
MCV RBC AUTO: 91.1 FL (ref 80–100)
MONOCYTES # BLD AUTO: 707 CELLS/UL (ref 200–950)
MONOCYTES NFR BLD AUTO: 9.3 %
NEUTROPHILS # BLD AUTO: 4081 CELLS/UL (ref 1500–7800)
NEUTROPHILS NFR BLD AUTO: 53.7 %
NONHDLC SERPL-MCNC: 90 MG/DL (CALC)
PLATELET # BLD AUTO: 250 THOUSAND/UL (ref 140–400)
PMV BLD REES-ECKER: 10.8 FL (ref 7.5–12.5)
POTASSIUM SERPL-SCNC: 4.8 MMOL/L (ref 3.5–5.3)
PROT SERPL-MCNC: 7 G/DL (ref 6.1–8.1)
PSA SERPL-MCNC: 4.2 NG/ML
RBC # BLD AUTO: 4.82 MILLION/UL (ref 4.2–5.8)
SL AMB EGFR AFRICAN AMERICAN: 93 ML/MIN/1.73M2
SL AMB EGFR NON AFRICAN AMERICAN: 81 ML/MIN/1.73M2
SODIUM SERPL-SCNC: 141 MMOL/L (ref 135–146)
TRIGL SERPL-MCNC: 131 MG/DL
WBC # BLD AUTO: 7.6 THOUSAND/UL (ref 3.8–10.8)

## 2019-06-13 ENCOUNTER — OFFICE VISIT (OUTPATIENT)
Dept: FAMILY MEDICINE CLINIC | Facility: CLINIC | Age: 64
End: 2019-06-13
Payer: COMMERCIAL

## 2019-06-13 VITALS
WEIGHT: 235 LBS | TEMPERATURE: 98.7 F | SYSTOLIC BLOOD PRESSURE: 112 MMHG | DIASTOLIC BLOOD PRESSURE: 62 MMHG | BODY MASS INDEX: 31.83 KG/M2 | HEIGHT: 72 IN | OXYGEN SATURATION: 98 % | HEART RATE: 70 BPM | RESPIRATION RATE: 17 BRPM

## 2019-06-13 DIAGNOSIS — R73.09 ABNORMAL GLUCOSE LEVEL: ICD-10-CM

## 2019-06-13 DIAGNOSIS — I25.10 CORONARY ARTERY DISEASE INVOLVING NATIVE CORONARY ARTERY OF NATIVE HEART WITHOUT ANGINA PECTORIS: Primary | ICD-10-CM

## 2019-06-13 DIAGNOSIS — I21.02 ACUTE ST ELEVATION MYOCARDIAL INFARCTION (STEMI) INVOLVING LEFT ANTERIOR DESCENDING (LAD) CORONARY ARTERY (HCC): ICD-10-CM

## 2019-06-13 DIAGNOSIS — E78.5 HYPERLIPIDEMIA, UNSPECIFIED HYPERLIPIDEMIA TYPE: ICD-10-CM

## 2019-06-13 DIAGNOSIS — Z91.89 ENCOUNTER FOR HEPATITIS C VIRUS SCREENING TEST FOR HIGH RISK PATIENT: ICD-10-CM

## 2019-06-13 DIAGNOSIS — Z11.59 ENCOUNTER FOR HEPATITIS C VIRUS SCREENING TEST FOR HIGH RISK PATIENT: ICD-10-CM

## 2019-06-13 PROCEDURE — 99214 OFFICE O/P EST MOD 30 MIN: CPT | Performed by: INTERNAL MEDICINE

## 2019-06-13 PROCEDURE — 3008F BODY MASS INDEX DOCD: CPT | Performed by: INTERNAL MEDICINE

## 2019-06-13 PROCEDURE — 1036F TOBACCO NON-USER: CPT | Performed by: INTERNAL MEDICINE

## 2019-06-13 PROCEDURE — 1160F RVW MEDS BY RX/DR IN RCRD: CPT | Performed by: INTERNAL MEDICINE

## 2019-06-13 RX ORDER — DOXAZOSIN 8 MG/1
8 TABLET ORAL
COMMUNITY
End: 2019-09-15 | Stop reason: SDUPTHER

## 2019-09-10 DIAGNOSIS — E78.5 HYPERLIPIDEMIA, UNSPECIFIED HYPERLIPIDEMIA TYPE: ICD-10-CM

## 2019-09-10 DIAGNOSIS — I25.10 CORONARY ARTERY DISEASE INVOLVING NATIVE CORONARY ARTERY OF NATIVE HEART WITHOUT ANGINA PECTORIS: ICD-10-CM

## 2019-09-10 DIAGNOSIS — I21.02 ACUTE ST ELEVATION MYOCARDIAL INFARCTION (STEMI) INVOLVING LEFT ANTERIOR DESCENDING (LAD) CORONARY ARTERY (HCC): ICD-10-CM

## 2019-09-10 RX ORDER — ICOSAPENT ETHYL 1000 MG/1
2 CAPSULE ORAL 2 TIMES DAILY
Qty: 360 CAPSULE | Refills: 3 | Status: SHIPPED | OUTPATIENT
Start: 2019-09-10 | End: 2020-09-03

## 2019-09-12 ENCOUNTER — TELEPHONE (OUTPATIENT)
Dept: FAMILY MEDICINE CLINIC | Facility: CLINIC | Age: 64
End: 2019-09-12

## 2019-09-12 NOTE — TELEPHONE ENCOUNTER
Pt called in stating he was having SOB  Due to Pts past medical history Dr Courtney Meraz advised Pt go to hospital and not drive himself  I explained to Pt that if he did not have someone to drive him, he should call an ambulance      Pt verbalizes understanding and states he will have someone take him to hospital ASAP

## 2019-09-15 DIAGNOSIS — I21.02 ACUTE ST ELEVATION MYOCARDIAL INFARCTION (STEMI) INVOLVING LEFT ANTERIOR DESCENDING (LAD) CORONARY ARTERY (HCC): Primary | ICD-10-CM

## 2019-09-16 RX ORDER — DOXAZOSIN 8 MG/1
TABLET ORAL
Qty: 90 TABLET | Refills: 3 | Status: SHIPPED | OUTPATIENT
Start: 2019-09-16 | End: 2020-09-14

## 2019-10-10 DIAGNOSIS — G72.2 TOXIC MYOPATHY: ICD-10-CM

## 2019-10-10 DIAGNOSIS — E78.00 PURE HYPERCHOLESTEROLEMIA: Primary | ICD-10-CM

## 2019-10-10 DIAGNOSIS — I25.10 CORONARY ARTERY DISEASE INVOLVING NATIVE CORONARY ARTERY OF NATIVE HEART WITHOUT ANGINA PECTORIS: ICD-10-CM

## 2019-10-10 RX ORDER — EVOLOCUMAB 140 MG/ML
INJECTION, SOLUTION SUBCUTANEOUS
Qty: 2 PEN | Refills: 11 | Status: SHIPPED | OUTPATIENT
Start: 2019-10-10 | End: 2019-11-18 | Stop reason: SDUPTHER

## 2019-10-24 ENCOUNTER — OFFICE VISIT (OUTPATIENT)
Dept: UROLOGY | Facility: MEDICAL CENTER | Age: 64
End: 2019-10-24
Payer: COMMERCIAL

## 2019-10-24 VITALS
SYSTOLIC BLOOD PRESSURE: 132 MMHG | BODY MASS INDEX: 31.42 KG/M2 | HEART RATE: 75 BPM | WEIGHT: 232 LBS | DIASTOLIC BLOOD PRESSURE: 76 MMHG | HEIGHT: 72 IN

## 2019-10-24 DIAGNOSIS — R97.20 ELEVATED PSA: ICD-10-CM

## 2019-10-24 DIAGNOSIS — C61 MALIGNANT NEOPLASM OF PROSTATE (HCC): Primary | ICD-10-CM

## 2019-10-24 LAB
SL AMB  POCT GLUCOSE, UA: NEGATIVE
SL AMB LEUKOCYTE ESTERASE,UA: NEGATIVE
SL AMB POCT BILIRUBIN,UA: NEGATIVE
SL AMB POCT BLOOD,UA: ABNORMAL
SL AMB POCT CLARITY,UA: CLEAR
SL AMB POCT COLOR,UA: YELLOW
SL AMB POCT KETONES,UA: NEGATIVE
SL AMB POCT NITRITE,UA: NEGATIVE
SL AMB POCT PH,UA: 5.5
SL AMB POCT SPECIFIC GRAVITY,UA: 1.01
SL AMB POCT URINE PROTEIN: NEGATIVE
SL AMB POCT UROBILINOGEN: 0.2

## 2019-10-24 PROCEDURE — 81003 URINALYSIS AUTO W/O SCOPE: CPT | Performed by: UROLOGY

## 2019-10-24 PROCEDURE — 99214 OFFICE O/P EST MOD 30 MIN: CPT | Performed by: UROLOGY

## 2019-10-24 NOTE — ASSESSMENT & PLAN NOTE
The patient has been on active surveillance since 20/10 without evidence of progressive disease  His PSA is mildly elevated, but it has been for years and there is no general trend upward  Return in 1 year

## 2019-10-24 NOTE — PROGRESS NOTES
Assessment/Plan:    Adenocarcinoma of prostate [facility])  The patient has been on active surveillance since 20/10 without evidence of progressive disease  His PSA is mildly elevated, but it has been for years and there is no general trend upward  Return in 1 year  Diagnoses and all orders for this visit:    Malignant neoplasm of prostate ([facility] [address] )  -     Cancel: PSA, Total Screen; Future  -     PSA, Total Screen; Future    Elevated PSA  -     POCT urine dip auto non-scope          Subjective:      Patient ID: Vinita Hernandez is a 75 y o  male  HPI  Prostate cancer: The patient has been on active surveillance since 2010  A prostate biopsy then showed a Denisha 6 carcinoma in 10% of 1 core  He had prostate biopsies twice after that, an all samples were negative        PSA:  4 2 on May 10, 2019  His PSA has varied from a low of 3 0 in 2014 to a high of 4 8 in 2012  There has been no trend either up or down  He notes urinary urgency and nocturia x 1  He denies other significant urinary symptoms  He denies gross hematuria, urinary tract infections or incontinence  He is taking doxazosin for his symptoms  Tried tamsulosin and it was ineffective  AUA SYMPTOM SCORE      Most Recent Value   AUA SYMPTOM SCORE   How often have you had a sensation of not emptying your bladder completely after you finished urinating? 1   How often have you had to urinate again less than two hours after you finished urinating? 1   How often have you found you stopped and started again several times when you urinate?  0   How often have you found it difficult to postpone urination? 2   How often have you had a weak urinary stream?  0   How often have you had to push or strain to begin urination? 0   How many times did you most typically get up to urinate from the time you went to bed at night until the time you got up in the morning?   1   Quality of Life: If you were to spend the rest of your life with your urinary condition just the way it is now, how would you feel about that?  1   AUA SYMPTOM SCORE  5                The following portions of the patient's history were reviewed and updated as appropriate: allergies, current medications, past family history, past medical history, past social history, past surgical history and problem list     Review of Systems    Constitutional: Negative for activity change and fatigue  Respiratory: Negative for shortness of breath and wheezing  Hx of pulmonary emboli following hernia surgery  Cardiovascular: Negative for chest pain  MI in 2014  Taking clopidogrel and ASA  Gastrointestinal: Negative for abdominal pain  Genitourinary: Negative for difficulty urinating, dysuria, frequency, hematuria and urgency  Musculoskeletal: Negative for back pain and gait problem  Skin: Negative  Allergic/Immunologic: Negative  Neurological: Negative  Psychiatric/Behavioral: Negative  Objective:      /76 (BP Location: Left arm, Patient Position: Sitting, Cuff Size: Standard)   Pulse 75   Ht 6' (1 829 m)   Wt 105 kg (232 lb)   BMI 31 46 kg/m²          Physical Exam   Constitutional: He is oriented to person, place, and time  He appears well-developed and well-nourished  HENT:   Head: Normocephalic and atraumatic  Eyes: EOM are normal    Neck: Normal range of motion  Neck supple  Pulmonary/Chest: Effort normal    Genitourinary: Rectum normal    Genitourinary Comments: The prostate is 30 gm, firm, smooth, non-tender  Musculoskeletal: Normal range of motion  Neurological: He is alert and oriented to person, place, and time  Skin: Skin is warm and dry  Psychiatric: He has a normal mood and affect   His behavior is normal  Judgment and thought content normal

## 2019-11-18 DIAGNOSIS — E78.00 PURE HYPERCHOLESTEROLEMIA: ICD-10-CM

## 2019-11-18 DIAGNOSIS — G72.2 TOXIC MYOPATHY: ICD-10-CM

## 2019-11-18 DIAGNOSIS — I25.10 CORONARY ARTERY DISEASE INVOLVING NATIVE CORONARY ARTERY OF NATIVE HEART WITHOUT ANGINA PECTORIS: ICD-10-CM

## 2020-02-18 ENCOUNTER — TELEPHONE (OUTPATIENT)
Dept: FAMILY MEDICINE CLINIC | Facility: CLINIC | Age: 65
End: 2020-02-18

## 2020-02-18 NOTE — TELEPHONE ENCOUNTER
Patient called and stated that he has been trying to get a refill on his Repatha  He said that he called his pharmacy to see what the status was on the medication and the pharmacy said that they haven't received any paperwork from our office  Now the patient would like to know what the status is on our end with the prior auth for this medication    Please advise the patient on the status [phone]

## 2020-02-19 NOTE — TELEPHONE ENCOUNTER
No prior auth request was sent to our office  I will reach out to Pt today  When patients call about any prior auths needed, please double check that a prior auth was sent to our office  If none is in chart, please double check pharmacy where this medication needs to go  This kind of medication will always need to go to a  speciality pharmacy  Thank you for your help on this!

## 2020-02-20 DIAGNOSIS — I25.10 CORONARY ARTERY DISEASE INVOLVING NATIVE CORONARY ARTERY OF NATIVE HEART WITHOUT ANGINA PECTORIS: ICD-10-CM

## 2020-02-20 DIAGNOSIS — G72.2 TOXIC MYOPATHY: ICD-10-CM

## 2020-02-20 DIAGNOSIS — E78.00 PURE HYPERCHOLESTEROLEMIA: ICD-10-CM

## 2020-02-20 NOTE — TELEPHONE ENCOUNTER
Patient called stating that Repatha needed a Prior Auth and [facility] Faxed iver the requested its not in the patient chart

## 2020-03-03 ENCOUNTER — TELEPHONE (OUTPATIENT)
Dept: FAMILY MEDICINE CLINIC | Facility: CLINIC | Age: 65
End: 2020-03-03

## 2020-07-15 DIAGNOSIS — E78.00 PURE HYPERCHOLESTEROLEMIA: ICD-10-CM

## 2020-07-15 DIAGNOSIS — G72.2 TOXIC MYOPATHY: ICD-10-CM

## 2020-07-15 DIAGNOSIS — I25.10 CORONARY ARTERY DISEASE INVOLVING NATIVE CORONARY ARTERY OF NATIVE HEART WITHOUT ANGINA PECTORIS: ICD-10-CM

## 2020-07-25 LAB
ALBUMIN SERPL-MCNC: 4.4 G/DL (ref 3.6–5.1)
ALBUMIN/GLOB SERPL: 1.7 (CALC) (ref 1–2.5)
ALP SERPL-CCNC: 50 U/L (ref 35–144)
ALT SERPL-CCNC: 13 U/L (ref 9–46)
AST SERPL-CCNC: 14 U/L (ref 10–35)
BASOPHILS # BLD AUTO: 77 CELLS/UL (ref 0–200)
BASOPHILS NFR BLD AUTO: 1 %
BILIRUB SERPL-MCNC: 0.5 MG/DL (ref 0.2–1.2)
BUN SERPL-MCNC: 16 MG/DL (ref 7–25)
BUN/CREAT SERPL: ABNORMAL (CALC) (ref 6–22)
CALCIUM SERPL-MCNC: 9.8 MG/DL (ref 8.6–10.3)
CHLORIDE SERPL-SCNC: 102 MMOL/L (ref 98–110)
CO2 SERPL-SCNC: 25 MMOL/L (ref 20–32)
CREAT SERPL-MCNC: 1 MG/DL (ref 0.7–1.25)
EOSINOPHIL # BLD AUTO: 131 CELLS/UL (ref 15–500)
EOSINOPHIL NFR BLD AUTO: 1.7 %
ERYTHROCYTE [DISTWIDTH] IN BLOOD BY AUTOMATED COUNT: 13.2 % (ref 11–15)
GLOBULIN SER CALC-MCNC: 2.6 G/DL (CALC) (ref 1.9–3.7)
GLUCOSE SERPL-MCNC: 110 MG/DL (ref 65–99)
HBA1C MFR BLD: 5.9 % OF TOTAL HGB
HCT VFR BLD AUTO: 44.2 % (ref 38.5–50)
HCV AB S/CO SERPL IA: 0.01
HCV AB SERPL QL IA: NORMAL
HGB BLD-MCNC: 15 G/DL (ref 13.2–17.1)
LDLC SERPL DIRECT ASSAY-MCNC: 82 MG/DL
LYMPHOCYTES # BLD AUTO: 2379 CELLS/UL (ref 850–3900)
LYMPHOCYTES NFR BLD AUTO: 30.9 %
MCH RBC QN AUTO: 30.8 PG (ref 27–33)
MCHC RBC AUTO-ENTMCNC: 33.9 G/DL (ref 32–36)
MCV RBC AUTO: 90.8 FL (ref 80–100)
MONOCYTES # BLD AUTO: 624 CELLS/UL (ref 200–950)
MONOCYTES NFR BLD AUTO: 8.1 %
NEUTROPHILS # BLD AUTO: 4489 CELLS/UL (ref 1500–7800)
NEUTROPHILS NFR BLD AUTO: 58.3 %
PLATELET # BLD AUTO: 284 THOUSAND/UL (ref 140–400)
PMV BLD REES-ECKER: 10.8 FL (ref 7.5–12.5)
POTASSIUM SERPL-SCNC: 4.9 MMOL/L (ref 3.5–5.3)
PROT SERPL-MCNC: 7 G/DL (ref 6.1–8.1)
RBC # BLD AUTO: 4.87 MILLION/UL (ref 4.2–5.8)
SL AMB EGFR AFRICAN AMERICAN: 90 ML/MIN/1.73M2
SL AMB EGFR NON AFRICAN AMERICAN: 78 ML/MIN/1.73M2
SODIUM SERPL-SCNC: 137 MMOL/L (ref 135–146)
TRIGL SERPL-MCNC: 157 MG/DL
WBC # BLD AUTO: 7.7 THOUSAND/UL (ref 3.8–10.8)

## 2020-07-28 ENCOUNTER — TELEPHONE (OUTPATIENT)
Dept: UROLOGY | Facility: MEDICAL CENTER | Age: 65
End: 2020-07-28

## 2020-07-28 ENCOUNTER — OFFICE VISIT (OUTPATIENT)
Dept: FAMILY MEDICINE CLINIC | Facility: CLINIC | Age: 65
End: 2020-07-28
Payer: COMMERCIAL

## 2020-07-28 VITALS
TEMPERATURE: 98.8 F | WEIGHT: 237.8 LBS | SYSTOLIC BLOOD PRESSURE: 120 MMHG | RESPIRATION RATE: 16 BRPM | BODY MASS INDEX: 32.21 KG/M2 | HEART RATE: 74 BPM | OXYGEN SATURATION: 96 % | HEIGHT: 72 IN | DIASTOLIC BLOOD PRESSURE: 80 MMHG

## 2020-07-28 DIAGNOSIS — E78.2 MIXED HYPERLIPIDEMIA: ICD-10-CM

## 2020-07-28 DIAGNOSIS — M10.00 IDIOPATHIC GOUT, UNSPECIFIED CHRONICITY, UNSPECIFIED SITE: ICD-10-CM

## 2020-07-28 DIAGNOSIS — M1A.0690 IDIOPATHIC CHRONIC GOUT OF KNEE WITHOUT TOPHUS, UNSPECIFIED LATERALITY: ICD-10-CM

## 2020-07-28 DIAGNOSIS — Z98.61 STATUS POST PERCUTANEOUS TRANSLUMINAL CORONARY ANGIOPLASTY: ICD-10-CM

## 2020-07-28 DIAGNOSIS — R73.09 ABNORMAL GLUCOSE LEVEL: ICD-10-CM

## 2020-07-28 DIAGNOSIS — I25.10 CORONARY ARTERY DISEASE INVOLVING NATIVE CORONARY ARTERY OF NATIVE HEART WITHOUT ANGINA PECTORIS: ICD-10-CM

## 2020-07-28 DIAGNOSIS — C61 ADENOCARCINOMA OF PROSTATE (HCC): ICD-10-CM

## 2020-07-28 DIAGNOSIS — Z86.711 HX PULMONARY EMBOLISM: Primary | ICD-10-CM

## 2020-07-28 DIAGNOSIS — I25.5 CARDIOMYOPATHY, ISCHEMIC: ICD-10-CM

## 2020-07-28 DIAGNOSIS — G72.2 TOXIC MYOPATHY: ICD-10-CM

## 2020-07-28 DIAGNOSIS — Z86.711 HX PULMONARY EMBOLISM: ICD-10-CM

## 2020-07-28 DIAGNOSIS — M10.9 PODAGRA: Primary | ICD-10-CM

## 2020-07-28 PROCEDURE — 1160F RVW MEDS BY RX/DR IN RCRD: CPT | Performed by: INTERNAL MEDICINE

## 2020-07-28 PROCEDURE — 99214 OFFICE O/P EST MOD 30 MIN: CPT | Performed by: INTERNAL MEDICINE

## 2020-07-28 PROCEDURE — 3288F FALL RISK ASSESSMENT DOCD: CPT | Performed by: INTERNAL MEDICINE

## 2020-07-28 PROCEDURE — 3074F SYST BP LT 130 MM HG: CPT | Performed by: INTERNAL MEDICINE

## 2020-07-28 PROCEDURE — 3008F BODY MASS INDEX DOCD: CPT | Performed by: INTERNAL MEDICINE

## 2020-07-28 PROCEDURE — 1036F TOBACCO NON-USER: CPT | Performed by: INTERNAL MEDICINE

## 2020-07-28 PROCEDURE — 20600 DRAIN/INJ JOINT/BURSA W/O US: CPT | Performed by: INTERNAL MEDICINE

## 2020-07-28 PROCEDURE — 1101F PT FALLS ASSESS-DOCD LE1/YR: CPT | Performed by: INTERNAL MEDICINE

## 2020-07-28 PROCEDURE — 3079F DIAST BP 80-89 MM HG: CPT | Performed by: INTERNAL MEDICINE

## 2020-07-28 RX ORDER — TRAMADOL HYDROCHLORIDE 50 MG/1
100 TABLET ORAL AS NEEDED
Qty: 30 TABLET | Refills: 1 | Status: SHIPPED | OUTPATIENT
Start: 2020-07-28 | End: 2021-05-25 | Stop reason: SDUPTHER

## 2020-07-28 NOTE — PROGRESS NOTES
The anterior Right metatarsophalangeal joint was cleansed in a sterile fashion with alcohol  The anterior approach was anesthetized with1 cc of 2% xylocaine  With a 25 gauge needle the joint was entered and no  fluid was removed from the joint and then the patient was injected with a 0 5 cc of 2% xylocaine and40 mg of triamcinolone kenalogue  90% improvement in the patient's pain was noted within 10 min as expected from the xylocaine  A Band-Aid was applied

## 2020-07-28 NOTE — TELEPHONE ENCOUNTER
----- Message from Susan Chattejree MD sent at 7/27/2020  1:16 PM EDT -----  Current PSA is 4 1, comparable to last year  The patient's PSA has varied somewhat and this level is in the middle of his usual range  No action necessary now  Keep regular appointment  Please inform the patient  Thank you

## 2020-07-28 NOTE — PROGRESS NOTES
ADULT ANNUAL PHYSICAL  [facility]    NAME: Kulwant Wheeler  AGE: 77 y o  SEX: male  : 1955     DATE: 2020     Assessment and Plan:    patient presents with acute podagra of the right bunion joint and request id injection treatment  Patient was injected with 40 mg of triamcinolone a total of 1 5 cc of 2% xylocaine without incident with 90% relief before discharge  Patient's direct LDL is 80 his triglycerides are 153  This is done on Repatha 140 mg every 2 weeks  His insurance company is notified him that Lauryn Mcallister will no longer be covered and following as covered and he will be switched to problem 150 mg every 2 weeks  He was sampled with month's worth of Repatha until this could take place    Problem List Items Addressed This Visit        Cardiovascular and Mediastinum    Cardiomyopathy, ischemic    Coronary artery disease involving native coronary artery of native heart without angina pectoris    Relevant Medications    Alirocumab (Praluent) 150 MG/ML SOAJ    Other Relevant Orders    CBC and differential    Comprehensive metabolic panel    Hemoglobin A1C    LDL cholesterol, direct    Triglycerides    Apolipoprotein B       Musculoskeletal and Integument    Toxic myopathy       Genitourinary    Adenocarcinoma of prostate (HCC)    Relevant Medications    Alirocumab (Praluent) 150 MG/ML SOAJ    Other Relevant Orders    CBC and differential    Comprehensive metabolic panel    Hemoglobin A1C    LDL cholesterol, direct    Triglycerides    Apolipoprotein B       Other    Abnormal glucose level    Gout    Relevant Medications    Alirocumab (Praluent) 150 MG/ML SOAJ    Other Relevant Orders    CBC and differential    Comprehensive metabolic panel    Hemoglobin A1C    LDL cholesterol, direct    Triglycerides    Apolipoprotein B    Hx pulmonary embolism    Hyperlipidemia    Relevant Medications    Alirocumab (Praluent) 150 MG/ML SOAJ Other Relevant Orders    CBC and differential    Comprehensive metabolic panel    Hemoglobin A1C    LDL cholesterol, direct    Triglycerides    Apolipoprotein B    Status post percutaneous transluminal coronary angioplasty    Relevant Medications    Alirocumab (Praluent) 150 MG/ML SOAJ      Other Visit Diagnoses     Podagra    -  Primary    Relevant Orders    Uric acid          Health maintenance and preventative care screenings were discussed with patient today  Appropriate education was printed on patient's after visit summary  · Discussed risks/benefits of screening for   · Physical exam  · Cholesterol test  · Blood pressure screening  · Eye exam  · Dental exam  · Prostate Cancer  · Colon Cancer  Noreene Money agrees to look in to having screenings done, if not already completed  · Immunizations were reviewed:   Immunization History   Administered Date(s) Administered    Fluzone Split Quad 0 5 mL 10/13/2016    INFLUENZA 10/13/2016, 11/09/2018    Influenza, injectable, quadrivalent, preservative free 0 5 mL 11/09/2018         Counseling:  · Exercise: the importance of regular exercise/physical activity was discussed  Recommend exercise 3-5 times per week for at least 30 minutes  No follow-ups on file  Chief Complaint:     Chief Complaint   Patient presents with    Annual Exam      History of Present Illness:     Adult Annual Physical   Patient here for a comprehensive physical exam  The patient reports problems - Gout Pain Right Foot Big Toe  Diet and Physical Activity  · Diet/Nutrition: consuming 3-5 servings of fruits/vegetables daily  · Weight concerns: None / Has concerns what where addressed today  · Exercise: no formal exercise        Depression Screening  PHQ-9 Depression Screening    PHQ-9:    Frequency of the following problems over the past two weeks:       Little interest or pleasure in doing things:  0 - not at all  Feeling down, depressed, or hopeless:  0 - not at all  PHQ-2 Score:  0         General Health  · Sleep: gets more than 8 hours of sleep on average  · Hearing: normal - bilateral   · Vision: no vision problems  · Dental: regular dental visits   Health  · History of STDs?: no   · Erectile dysfunction: no      Review of Systems:     Review of Systems   Constitutional: Negative for appetite change, fatigue, fever and unexpected weight change  HENT: Negative for rhinorrhea, sinus pressure, sinus pain, sneezing and sore throat  Eyes: Negative for visual disturbance  Respiratory: Negative for cough, chest tightness, shortness of breath and wheezing  Cardiovascular: Negative for chest pain, palpitations and leg swelling  Gastrointestinal: Negative for abdominal distention, abdominal pain, blood in stool, constipation, diarrhea, nausea and vomiting  Endocrine: Negative for polydipsia and polyuria  Genitourinary: Negative for decreased urine volume, difficulty urinating, dysuria, hematuria and urgency  Musculoskeletal: Positive for joint swelling  Negative for arthralgias, back pain and neck pain  Skin: Negative for rash  Allergic/Immunologic: Negative for environmental allergies  Neurological: Negative for tremors, weakness, light-headedness, numbness and headaches  Hematological: Does not bruise/bleed easily  Psychiatric/Behavioral: Negative for agitation, behavioral problems, confusion and dysphoric mood  The patient is not nervous/anxious         Past Medical History:     Past Medical History:   Diagnosis Date    Acute ST elevation myocardial infarction (STEMI) involving left anterior descending (LAD) coronary artery ([facility] ) 9/8/2016    BPH with urinary obstruction     Elevated PSA     Erectile dysfunction     Gout     History of colonoscopy 01/19/2018    Hypercholesteremia     Hypertension     [facility]     [facility]     Right inguinal hernia       Past Surgical History:     Past Surgical History:   Procedure Laterality Date    PROSTATE BIOPSY Bilateral 2011    TONSILLECTOMY        Social History:     Social History     Socioeconomic History    Marital status: /Civil Union     Spouse name: None    Number of children: None    Years of education: None    Highest education level: None   Occupational History    Occupation: manager   Social Needs    Financial resource strain: None    Food insecurity:     Worry: None     Inability: None    Transportation needs:     Medical: None     Non-medical: None   Tobacco Use    Smoking status: Former Smoker     Types: Cigarettes     Last attempt to quit:      Years since quittin 6    Smokeless tobacco: Never Used   Substance and Sexual Activity    Alcohol use:  Yes     Alcohol/week: 1 0 standard drinks     Types: 1 Cans of beer per week     Comment: rarely    Drug use: No    Sexual activity: Yes     Partners: Female     Birth control/protection: None   Lifestyle    Physical activity:     Days per week: None     Minutes per session: None    Stress: None   Relationships    Social connections:     Talks on phone: None     Gets together: None     Attends Rastafarian service: None     Active member of club or organization: None     Attends meetings of clubs or organizations: None     Relationship status: None    Intimate partner violence:     Fear of current or ex partner: None     Emotionally abused: None     Physically abused: None     Forced sexual activity: None   Other Topics Concern    None   Social History Narrative    None      Family History:     Family History   Problem Relation Age of Onset    Cancer Family         Bladder cancer    Diabetes Family     Heart attack Family     Hypertension Family       Current Medications:     Current Outpatient Medications   Medication Sig Dispense Refill    clopidogrel (PLAVIX) 75 mg tablet Take 75 mg by mouth      [facility] ASPIRIN ADULT LOW DOSE 81 MG chewable tablet daily      doxazosin (CARDURA) 8 MG tablet TAKE 1 TABLET BY MOUTH DAILY 90 tablet 3    Icosapent Ethyl (VASCEPA) 1 g CAPS Take 2 capsules (2 g total) by mouth 2 (two) times a day 360 capsule 3    lisinopril (ZESTRIL) 5 mg tablet Take 2 5 mg by mouth daily       loratadine (CLARITIN) 10 mg tablet Take 10 mg by mouth daily      metoprolol succinate (TOPROL-XL) 25 mg 24 hr tablet Take 25 mg by mouth       Alirocumab (Praluent) 150 MG/ML SOAJ Inject 150 mg under the skin every 14 (fourteen) days 2 pen 11    traMADol (ULTRAM) 50 mg tablet Take 2 tablets (100 mg total) by mouth as needed for moderate pain 30 tablet 1     No current facility-administered medications for this visit  Allergies: Allergies   Allergen Reactions    No Active Allergies       Objective:     /80 (BP Location: Left arm, Patient Position: Sitting, Cuff Size: Adult)   Pulse 74   Temp 98 8 °F (37 1 °C)   Resp 16   Ht 6' (1 829 m)   Wt 108 kg (237 lb 12 8 oz)   SpO2 96%   BMI 32 25 kg/m²     Physical Exam   Constitutional: He is oriented to person, place, and time  He appears well-developed and well-nourished  HENT:   Head: Normocephalic and atraumatic  Nose: Nose normal    Mouth/Throat: Oropharynx is clear and moist    Eyes: Pupils are equal, round, and reactive to light  Conjunctivae and EOM are normal    Neck: Normal range of motion  Neck supple  Cardiovascular: Normal rate, regular rhythm, normal heart sounds and intact distal pulses  Pulmonary/Chest: Effort normal and breath sounds normal    Abdominal: Soft  Bowel sounds are normal  He exhibits no mass  There is no tenderness  There is no rebound  No hernia  Musculoskeletal: Normal range of motion  He exhibits no edema or tenderness (Podagra right great toe bunion joint)  Lymphadenopathy:     He has no cervical adenopathy  Neurological: He is alert and oriented to person, place, and time  No cranial nerve deficit  Coordination normal    Skin: Skin is warm and dry  No rash noted  Psychiatric: He has a normal mood and affect  His behavior is normal  Judgment and thought content normal    Nursing note and vitals reviewed         Health Maintenance:     Health Maintenance   Topic Date Due    DTaP,Tdap,and Td Vaccines (1 - Tdap) 05/15/1964    Annual Physical  05/15/1971    Pneumococcal Vaccine: 65+ Years (1 of 2 - PCV13) 05/15/2018    Depression Screening PHQ  05/09/2020    BMI: Followup Plan  06/13/2020    Influenza Vaccine  07/01/2020    Fall Risk  07/28/2021    BMI: Adult  07/28/2021    CRC Screening: Colonoscopy  01/19/2028    Hepatitis C Screening  Completed    Pneumococcal Vaccine: Pediatrics (0 to 5 Years) and At-Risk Patients (6 to 59 Years)  Aged Out    HIB Vaccine  Aged Out    Hepatitis B Vaccine  Aged Out    IPV Vaccine  Aged Out    Hepatitis A Vaccine  Aged Out    Meningococcal ACWY Vaccine  Aged Out    HPV Vaccine  Aged [employer] History   Administered Date(s) Administered    Fluzone Split Quad 0 5 mL 10/13/2016    INFLUENZA 10/13/2016, 11/09/2018    Influenza, injectable, quadrivalent, preservative free 0 5 mL 11/09/2018       Ros Tillman MD  [address]

## 2020-08-18 ENCOUNTER — TELEPHONE (OUTPATIENT)
Dept: FAMILY MEDICINE CLINIC | Facility: CLINIC | Age: 65
End: 2020-08-18

## 2020-08-19 ENCOUNTER — TELEPHONE (OUTPATIENT)
Dept: FAMILY MEDICINE CLINIC | Facility: CLINIC | Age: 65
End: 2020-08-19

## 2020-08-19 NOTE — TELEPHONE ENCOUNTER
Patient called and stated that the medication that is to replace Repatha wasn't called into the pharmacy  He stated that he hasn't receive it as of yet and his pharmacy stated they haven't received it either    Please advise

## 2020-08-20 ENCOUNTER — TELEPHONE (OUTPATIENT)
Dept: FAMILY MEDICINE CLINIC | Facility: CLINIC | Age: 65
End: 2020-08-20

## 2020-09-03 DIAGNOSIS — I21.02 ACUTE ST ELEVATION MYOCARDIAL INFARCTION (STEMI) INVOLVING LEFT ANTERIOR DESCENDING (LAD) CORONARY ARTERY (HCC): ICD-10-CM

## 2020-09-03 DIAGNOSIS — E78.5 HYPERLIPIDEMIA, UNSPECIFIED HYPERLIPIDEMIA TYPE: ICD-10-CM

## 2020-09-03 DIAGNOSIS — I25.10 CORONARY ARTERY DISEASE INVOLVING NATIVE CORONARY ARTERY OF NATIVE HEART WITHOUT ANGINA PECTORIS: ICD-10-CM

## 2020-09-03 RX ORDER — ICOSAPENT ETHYL 1000 MG/1
CAPSULE ORAL
Qty: 360 CAPSULE | Refills: 3 | Status: SHIPPED | OUTPATIENT
Start: 2020-09-03 | End: 2021-08-12

## 2020-09-13 DIAGNOSIS — I21.02 ACUTE ST ELEVATION MYOCARDIAL INFARCTION (STEMI) INVOLVING LEFT ANTERIOR DESCENDING (LAD) CORONARY ARTERY (HCC): ICD-10-CM

## 2020-09-14 RX ORDER — DOXAZOSIN 8 MG/1
TABLET ORAL
Qty: 90 TABLET | Refills: 3 | Status: SHIPPED | OUTPATIENT
Start: 2020-09-14 | End: 2021-05-28

## 2020-09-18 DIAGNOSIS — I25.10 CORONARY ARTERY DISEASE INVOLVING NATIVE CORONARY ARTERY OF NATIVE HEART WITHOUT ANGINA PECTORIS: ICD-10-CM

## 2020-09-18 DIAGNOSIS — M10.00 IDIOPATHIC GOUT, UNSPECIFIED CHRONICITY, UNSPECIFIED SITE: ICD-10-CM

## 2020-09-18 DIAGNOSIS — G72.2 TOXIC MYOPATHY: Primary | ICD-10-CM

## 2020-09-18 DIAGNOSIS — I10 ESSENTIAL HYPERTENSION: ICD-10-CM

## 2020-09-18 RX ORDER — ALIROCUMAB 150 MG/ML
150 INJECTION, SOLUTION SUBCUTANEOUS
Qty: 2 PEN | Refills: 11 | Status: SHIPPED | OUTPATIENT
Start: 2020-09-18 | End: 2020-12-01 | Stop reason: SDUPTHER

## 2020-09-18 RX ORDER — EVOLOCUMAB 140 MG/ML
INJECTION, SOLUTION SUBCUTANEOUS
COMMUNITY
End: 2021-05-24

## 2020-11-05 ENCOUNTER — OFFICE VISIT (OUTPATIENT)
Dept: UROLOGY | Facility: MEDICAL CENTER | Age: 65
End: 2020-11-05
Payer: COMMERCIAL

## 2020-11-05 VITALS
BODY MASS INDEX: 31.02 KG/M2 | WEIGHT: 229 LBS | HEART RATE: 66 BPM | SYSTOLIC BLOOD PRESSURE: 116 MMHG | HEIGHT: 72 IN | TEMPERATURE: 97.4 F | DIASTOLIC BLOOD PRESSURE: 80 MMHG

## 2020-11-05 DIAGNOSIS — C61 MALIGNANT NEOPLASM OF PROSTATE (HCC): Primary | ICD-10-CM

## 2020-11-05 DIAGNOSIS — N52.8 OTHER MALE ERECTILE DYSFUNCTION: ICD-10-CM

## 2020-11-05 DIAGNOSIS — N13.8 BPH WITH OBSTRUCTION/LOWER URINARY TRACT SYMPTOMS: ICD-10-CM

## 2020-11-05 DIAGNOSIS — N40.1 BPH WITH OBSTRUCTION/LOWER URINARY TRACT SYMPTOMS: ICD-10-CM

## 2020-11-05 DIAGNOSIS — R35.1 NOCTURIA: ICD-10-CM

## 2020-11-05 DIAGNOSIS — C61 ADENOCARCINOMA OF PROSTATE (HCC): ICD-10-CM

## 2020-11-05 DIAGNOSIS — R97.20 ELEVATED PSA: ICD-10-CM

## 2020-11-05 DIAGNOSIS — Z12.5 SPECIAL SCREENING FOR MALIGNANT NEOPLASM OF PROSTATE: ICD-10-CM

## 2020-11-05 LAB
SL AMB  POCT GLUCOSE, UA: NORMAL
SL AMB LEUKOCYTE ESTERASE,UA: NORMAL
SL AMB POCT BILIRUBIN,UA: NORMAL
SL AMB POCT BLOOD,UA: NORMAL
SL AMB POCT CLARITY,UA: CLEAR
SL AMB POCT COLOR,UA: YELLOW
SL AMB POCT KETONES,UA: NORMAL
SL AMB POCT NITRITE,UA: NORMAL
SL AMB POCT PH,UA: 5.5
SL AMB POCT SPECIFIC GRAVITY,UA: 1.03
SL AMB POCT URINE PROTEIN: NORMAL
SL AMB POCT UROBILINOGEN: 0.2

## 2020-11-05 PROCEDURE — 3008F BODY MASS INDEX DOCD: CPT | Performed by: UROLOGY

## 2020-11-05 PROCEDURE — 99214 OFFICE O/P EST MOD 30 MIN: CPT | Performed by: UROLOGY

## 2020-11-05 PROCEDURE — 3079F DIAST BP 80-89 MM HG: CPT | Performed by: UROLOGY

## 2020-11-05 PROCEDURE — 81003 URINALYSIS AUTO W/O SCOPE: CPT | Performed by: UROLOGY

## 2020-11-05 PROCEDURE — 3074F SYST BP LT 130 MM HG: CPT | Performed by: UROLOGY

## 2020-11-05 PROCEDURE — 1036F TOBACCO NON-USER: CPT | Performed by: UROLOGY

## 2020-11-05 PROCEDURE — 1160F RVW MEDS BY RX/DR IN RCRD: CPT | Performed by: UROLOGY

## 2020-11-05 RX ORDER — TAMSULOSIN HYDROCHLORIDE 0.4 MG/1
0.4 CAPSULE ORAL EVERY EVENING
Qty: 90 CAPSULE | Refills: 3 | Status: SHIPPED | OUTPATIENT
Start: 2020-11-05 | End: 2021-03-29 | Stop reason: SDUPTHER

## 2020-11-05 RX ORDER — TADALAFIL 20 MG/1
20 TABLET ORAL DAILY PRN
Qty: 10 TABLET | Refills: 3 | Status: SHIPPED | OUTPATIENT
Start: 2020-11-05

## 2020-11-29 LAB — HBA1C MFR BLD HPLC: 5.9 %

## 2020-12-01 ENCOUNTER — OFFICE VISIT (OUTPATIENT)
Dept: FAMILY MEDICINE CLINIC | Facility: CLINIC | Age: 65
End: 2020-12-01
Payer: COMMERCIAL

## 2020-12-01 VITALS
HEART RATE: 68 BPM | RESPIRATION RATE: 18 BRPM | WEIGHT: 227.2 LBS | BODY MASS INDEX: 30.11 KG/M2 | TEMPERATURE: 97.2 F | HEIGHT: 73 IN | SYSTOLIC BLOOD PRESSURE: 110 MMHG | OXYGEN SATURATION: 97 % | DIASTOLIC BLOOD PRESSURE: 70 MMHG

## 2020-12-01 DIAGNOSIS — G72.2 TOXIC MYOPATHY: ICD-10-CM

## 2020-12-01 DIAGNOSIS — Z98.61 STATUS POST PERCUTANEOUS TRANSLUMINAL CORONARY ANGIOPLASTY: ICD-10-CM

## 2020-12-01 DIAGNOSIS — I25.5 CARDIOMYOPATHY, ISCHEMIC: ICD-10-CM

## 2020-12-01 DIAGNOSIS — R73.09 ABNORMAL GLUCOSE LEVEL: ICD-10-CM

## 2020-12-01 DIAGNOSIS — G89.29 CHRONIC BILATERAL LOW BACK PAIN WITHOUT SCIATICA: ICD-10-CM

## 2020-12-01 DIAGNOSIS — N40.1 BPH WITH OBSTRUCTION/LOWER URINARY TRACT SYMPTOMS: ICD-10-CM

## 2020-12-01 DIAGNOSIS — E78.5 HYPERLIPIDEMIA, UNSPECIFIED HYPERLIPIDEMIA TYPE: ICD-10-CM

## 2020-12-01 DIAGNOSIS — C61 MALIGNANT NEOPLASM OF PROSTATE (HCC): ICD-10-CM

## 2020-12-01 DIAGNOSIS — N52.8 OTHER MALE ERECTILE DYSFUNCTION: ICD-10-CM

## 2020-12-01 DIAGNOSIS — I25.10 CORONARY ARTERY DISEASE INVOLVING NATIVE CORONARY ARTERY OF NATIVE HEART WITHOUT ANGINA PECTORIS: ICD-10-CM

## 2020-12-01 DIAGNOSIS — Z86.711 HX PULMONARY EMBOLISM: ICD-10-CM

## 2020-12-01 DIAGNOSIS — M54.50 CHRONIC BILATERAL LOW BACK PAIN WITHOUT SCIATICA: ICD-10-CM

## 2020-12-01 DIAGNOSIS — I10 ESSENTIAL HYPERTENSION: ICD-10-CM

## 2020-12-01 DIAGNOSIS — R73.03 PRE-DIABETES: ICD-10-CM

## 2020-12-01 DIAGNOSIS — M10.00 IDIOPATHIC GOUT, UNSPECIFIED CHRONICITY, UNSPECIFIED SITE: ICD-10-CM

## 2020-12-01 DIAGNOSIS — I21.02 ST ELEVATION MYOCARDIAL INFARCTION INVOLVING LEFT ANTERIOR DESCENDING (LAD) CORONARY ARTERY (HCC): ICD-10-CM

## 2020-12-01 DIAGNOSIS — N13.8 BPH WITH OBSTRUCTION/LOWER URINARY TRACT SYMPTOMS: ICD-10-CM

## 2020-12-01 DIAGNOSIS — K65.4 SCLEROSING MESENTERITIS (HCC): ICD-10-CM

## 2020-12-01 DIAGNOSIS — Z00.00 ENCOUNTER FOR WELLNESS EXAMINATION: Primary | ICD-10-CM

## 2020-12-01 PROCEDURE — 99397 PER PM REEVAL EST PAT 65+ YR: CPT | Performed by: INTERNAL MEDICINE

## 2020-12-01 PROCEDURE — 99214 OFFICE O/P EST MOD 30 MIN: CPT | Performed by: INTERNAL MEDICINE

## 2020-12-01 PROCEDURE — 1036F TOBACCO NON-USER: CPT | Performed by: INTERNAL MEDICINE

## 2020-12-01 PROCEDURE — 1160F RVW MEDS BY RX/DR IN RCRD: CPT | Performed by: INTERNAL MEDICINE

## 2020-12-01 PROCEDURE — 3008F BODY MASS INDEX DOCD: CPT | Performed by: INTERNAL MEDICINE

## 2020-12-01 PROCEDURE — 3074F SYST BP LT 130 MM HG: CPT | Performed by: INTERNAL MEDICINE

## 2020-12-01 PROCEDURE — 3078F DIAST BP <80 MM HG: CPT | Performed by: INTERNAL MEDICINE

## 2020-12-02 DIAGNOSIS — I25.10 CORONARY ARTERY DISEASE INVOLVING NATIVE CORONARY ARTERY OF NATIVE HEART WITHOUT ANGINA PECTORIS: ICD-10-CM

## 2020-12-02 DIAGNOSIS — M10.00 IDIOPATHIC GOUT, UNSPECIFIED CHRONICITY, UNSPECIFIED SITE: ICD-10-CM

## 2020-12-02 DIAGNOSIS — C61 ADENOCARCINOMA OF PROSTATE (HCC): ICD-10-CM

## 2020-12-02 DIAGNOSIS — Z98.61 STATUS POST PERCUTANEOUS TRANSLUMINAL CORONARY ANGIOPLASTY: ICD-10-CM

## 2020-12-02 DIAGNOSIS — E78.2 MIXED HYPERLIPIDEMIA: ICD-10-CM

## 2020-12-02 RX ORDER — ALIROCUMAB 150 MG/ML
150 INJECTION, SOLUTION SUBCUTANEOUS
Qty: 2 PEN | Refills: 11 | Status: SHIPPED | OUTPATIENT
Start: 2020-12-02 | End: 2021-02-24 | Stop reason: SDUPTHER

## 2021-01-30 ENCOUNTER — LAB (OUTPATIENT)
Dept: LAB | Facility: MEDICAL CENTER | Age: 66
End: 2021-01-30
Payer: MEDICARE

## 2021-01-30 DIAGNOSIS — R73.03 PRE-DIABETES: ICD-10-CM

## 2021-01-30 DIAGNOSIS — R73.09 ABNORMAL GLUCOSE LEVEL: ICD-10-CM

## 2021-01-30 DIAGNOSIS — E78.5 HYPERLIPIDEMIA, UNSPECIFIED HYPERLIPIDEMIA TYPE: ICD-10-CM

## 2021-01-30 DIAGNOSIS — Z00.00 ENCOUNTER FOR WELLNESS EXAMINATION: ICD-10-CM

## 2021-01-30 LAB
ALBUMIN SERPL BCP-MCNC: 4 G/DL (ref 3.5–5)
ALP SERPL-CCNC: 55 U/L (ref 46–116)
ALT SERPL W P-5'-P-CCNC: 30 U/L (ref 12–78)
ANION GAP SERPL CALCULATED.3IONS-SCNC: 5 MMOL/L (ref 4–13)
AST SERPL W P-5'-P-CCNC: 14 U/L (ref 5–45)
BASOPHILS # BLD AUTO: 0.08 THOUSANDS/ΜL (ref 0–0.1)
BASOPHILS NFR BLD AUTO: 1 % (ref 0–1)
BILIRUB SERPL-MCNC: 0.57 MG/DL (ref 0.2–1)
BUN SERPL-MCNC: 16 MG/DL (ref 5–25)
CALCIUM SERPL-MCNC: 9.5 MG/DL (ref 8.3–10.1)
CHLORIDE SERPL-SCNC: 111 MMOL/L (ref 100–108)
CO2 SERPL-SCNC: 28 MMOL/L (ref 21–32)
CREAT SERPL-MCNC: 0.9 MG/DL (ref 0.6–1.3)
EOSINOPHIL # BLD AUTO: 0.15 THOUSAND/ΜL (ref 0–0.61)
EOSINOPHIL NFR BLD AUTO: 2 % (ref 0–6)
ERYTHROCYTE [DISTWIDTH] IN BLOOD BY AUTOMATED COUNT: 13 % (ref 11.6–15.1)
EST. AVERAGE GLUCOSE BLD GHB EST-MCNC: 117 MG/DL
GFR SERPL CREATININE-BSD FRML MDRD: 88 ML/MIN/1.73SQ M
GLUCOSE P FAST SERPL-MCNC: 105 MG/DL (ref 65–99)
HBA1C MFR BLD: 5.7 %
HCT VFR BLD AUTO: 46.9 % (ref 36.5–49.3)
HGB BLD-MCNC: 14.8 G/DL (ref 12–17)
IMM GRANULOCYTES # BLD AUTO: 0.03 THOUSAND/UL (ref 0–0.2)
IMM GRANULOCYTES NFR BLD AUTO: 0 % (ref 0–2)
LDLC SERPL DIRECT ASSAY-MCNC: 86 MG/DL (ref 0–100)
LYMPHOCYTES # BLD AUTO: 2.56 THOUSANDS/ΜL (ref 0.6–4.47)
LYMPHOCYTES NFR BLD AUTO: 34 % (ref 14–44)
MCH RBC QN AUTO: 30.6 PG (ref 26.8–34.3)
MCHC RBC AUTO-ENTMCNC: 31.6 G/DL (ref 31.4–37.4)
MCV RBC AUTO: 97 FL (ref 82–98)
MONOCYTES # BLD AUTO: 0.73 THOUSAND/ΜL (ref 0.17–1.22)
MONOCYTES NFR BLD AUTO: 10 % (ref 4–12)
NEUTROPHILS # BLD AUTO: 3.92 THOUSANDS/ΜL (ref 1.85–7.62)
NEUTS SEG NFR BLD AUTO: 53 % (ref 43–75)
NRBC BLD AUTO-RTO: 0 /100 WBCS
PLATELET # BLD AUTO: 283 THOUSANDS/UL (ref 149–390)
PMV BLD AUTO: 10.8 FL (ref 8.9–12.7)
POTASSIUM SERPL-SCNC: 4.5 MMOL/L (ref 3.5–5.3)
PROT SERPL-MCNC: 7.3 G/DL (ref 6.4–8.2)
RBC # BLD AUTO: 4.83 MILLION/UL (ref 3.88–5.62)
SODIUM SERPL-SCNC: 144 MMOL/L (ref 136–145)
TRIGL SERPL-MCNC: 110 MG/DL
WBC # BLD AUTO: 7.47 THOUSAND/UL (ref 4.31–10.16)

## 2021-01-30 PROCEDURE — 84478 ASSAY OF TRIGLYCERIDES: CPT

## 2021-01-30 PROCEDURE — 83721 ASSAY OF BLOOD LIPOPROTEIN: CPT

## 2021-01-30 PROCEDURE — 83036 HEMOGLOBIN GLYCOSYLATED A1C: CPT

## 2021-01-30 PROCEDURE — 80053 COMPREHEN METABOLIC PANEL: CPT

## 2021-01-30 PROCEDURE — 85025 COMPLETE CBC W/AUTO DIFF WBC: CPT

## 2021-01-30 PROCEDURE — 36415 COLL VENOUS BLD VENIPUNCTURE: CPT

## 2021-02-02 ENCOUNTER — TELEMEDICINE (OUTPATIENT)
Dept: FAMILY MEDICINE CLINIC | Facility: CLINIC | Age: 66
End: 2021-02-02
Payer: MEDICARE

## 2021-02-02 DIAGNOSIS — E78.2 MIXED HYPERLIPIDEMIA: ICD-10-CM

## 2021-02-02 DIAGNOSIS — C61 MALIGNANT NEOPLASM OF PROSTATE (HCC): ICD-10-CM

## 2021-02-02 DIAGNOSIS — Z95.5 STATUS POST PRIMARY ANGIOPLASTY WITH CORONARY STENT: ICD-10-CM

## 2021-02-02 DIAGNOSIS — M54.50 CHRONIC BILATERAL LOW BACK PAIN WITHOUT SCIATICA: ICD-10-CM

## 2021-02-02 DIAGNOSIS — Z86.711 HX PULMONARY EMBOLISM: ICD-10-CM

## 2021-02-02 DIAGNOSIS — G89.29 CHRONIC BILATERAL LOW BACK PAIN WITHOUT SCIATICA: ICD-10-CM

## 2021-02-02 DIAGNOSIS — G72.2 TOXIC MYOPATHY: ICD-10-CM

## 2021-02-02 DIAGNOSIS — I25.10 CORONARY ARTERY DISEASE INVOLVING NATIVE CORONARY ARTERY OF NATIVE HEART WITHOUT ANGINA PECTORIS: Primary | ICD-10-CM

## 2021-02-02 DIAGNOSIS — R73.09 ABNORMAL GLUCOSE LEVEL: ICD-10-CM

## 2021-02-02 DIAGNOSIS — I21.02 ST ELEVATION MYOCARDIAL INFARCTION INVOLVING LEFT ANTERIOR DESCENDING (LAD) CORONARY ARTERY (HCC): ICD-10-CM

## 2021-02-02 PROCEDURE — 99214 OFFICE O/P EST MOD 30 MIN: CPT | Performed by: INTERNAL MEDICINE
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
## 2021-02-24 DIAGNOSIS — C61 ADENOCARCINOMA OF PROSTATE (HCC): ICD-10-CM

## 2021-02-24 DIAGNOSIS — I25.10 CORONARY ARTERY DISEASE INVOLVING NATIVE CORONARY ARTERY OF NATIVE HEART WITHOUT ANGINA PECTORIS: ICD-10-CM

## 2021-02-24 DIAGNOSIS — M10.00 IDIOPATHIC GOUT, UNSPECIFIED CHRONICITY, UNSPECIFIED SITE: ICD-10-CM

## 2021-02-24 DIAGNOSIS — Z98.61 STATUS POST PERCUTANEOUS TRANSLUMINAL CORONARY ANGIOPLASTY: ICD-10-CM

## 2021-02-24 DIAGNOSIS — E78.2 MIXED HYPERLIPIDEMIA: ICD-10-CM

## 2021-02-24 RX ORDER — ALIROCUMAB 150 MG/ML
150 INJECTION, SOLUTION SUBCUTANEOUS
Qty: 2 PEN | Refills: 11 | Status: SHIPPED | OUTPATIENT
Start: 2021-02-24 | End: 2022-05-17

## 2021-03-01 ENCOUNTER — TELEPHONE (OUTPATIENT)
Dept: FAMILY MEDICINE CLINIC | Facility: CLINIC | Age: 66
End: 2021-03-01

## 2021-03-05 ENCOUNTER — TELEPHONE (OUTPATIENT)
Dept: FAMILY MEDICINE CLINIC | Facility: CLINIC | Age: 66
End: 2021-03-05

## 2021-03-10 DIAGNOSIS — Z23 ENCOUNTER FOR IMMUNIZATION: ICD-10-CM

## 2021-03-21 ENCOUNTER — IMMUNIZATIONS (OUTPATIENT)
Dept: FAMILY MEDICINE CLINIC | Facility: HOSPITAL | Age: 66
End: 2021-03-21

## 2021-03-21 DIAGNOSIS — Z23 ENCOUNTER FOR IMMUNIZATION: Primary | ICD-10-CM

## 2021-03-21 PROCEDURE — 91300 SARS-COV-2 / COVID-19 MRNA VACCINE (PFIZER-BIONTECH) 30 MCG: CPT

## 2021-03-21 PROCEDURE — 0001A SARS-COV-2 / COVID-19 MRNA VACCINE (PFIZER-BIONTECH) 30 MCG: CPT

## 2021-03-29 DIAGNOSIS — R35.1 NOCTURIA: ICD-10-CM

## 2021-03-29 RX ORDER — TAMSULOSIN HYDROCHLORIDE 0.4 MG/1
0.4 CAPSULE ORAL 2 TIMES DAILY
Qty: 90 CAPSULE | Refills: 3 | Status: SHIPPED | OUTPATIENT
Start: 2021-03-29 | End: 2021-09-16

## 2021-04-11 ENCOUNTER — IMMUNIZATIONS (OUTPATIENT)
Dept: FAMILY MEDICINE CLINIC | Facility: HOSPITAL | Age: 66
End: 2021-04-11

## 2021-04-11 DIAGNOSIS — Z23 ENCOUNTER FOR IMMUNIZATION: Primary | ICD-10-CM

## 2021-04-11 PROCEDURE — 0002A SARS-COV-2 / COVID-19 MRNA VACCINE (PFIZER-BIONTECH) 30 MCG: CPT

## 2021-04-11 PROCEDURE — 91300 SARS-COV-2 / COVID-19 MRNA VACCINE (PFIZER-BIONTECH) 30 MCG: CPT

## 2021-05-17 ENCOUNTER — OFFICE VISIT (OUTPATIENT)
Dept: URGENT CARE | Facility: MEDICAL CENTER | Age: 66
End: 2021-05-17
Payer: MEDICARE

## 2021-05-17 VITALS
DIASTOLIC BLOOD PRESSURE: 64 MMHG | HEART RATE: 110 BPM | TEMPERATURE: 100 F | OXYGEN SATURATION: 96 % | SYSTOLIC BLOOD PRESSURE: 102 MMHG | RESPIRATION RATE: 18 BRPM

## 2021-05-17 DIAGNOSIS — R35.0 URINARY FREQUENCY: ICD-10-CM

## 2021-05-17 DIAGNOSIS — R31.9 URINARY TRACT INFECTION WITH HEMATURIA, SITE UNSPECIFIED: Primary | ICD-10-CM

## 2021-05-17 DIAGNOSIS — N39.0 URINARY TRACT INFECTION WITH HEMATURIA, SITE UNSPECIFIED: Primary | ICD-10-CM

## 2021-05-17 LAB
SL AMB  POCT GLUCOSE, UA: ABNORMAL
SL AMB LEUKOCYTE ESTERASE,UA: ABNORMAL
SL AMB POCT BILIRUBIN,UA: ABNORMAL
SL AMB POCT BLOOD,UA: ABNORMAL
SL AMB POCT CLARITY,UA: ABNORMAL
SL AMB POCT COLOR,UA: ABNORMAL
SL AMB POCT KETONES,UA: ABNORMAL
SL AMB POCT NITRITE,UA: POSITIVE
SL AMB POCT PH,UA: 6
SL AMB POCT SPECIFIC GRAVITY,UA: 1.02
SL AMB POCT URINE PROTEIN: ABNORMAL
SL AMB POCT UROBILINOGEN: 0.2

## 2021-05-17 PROCEDURE — G0463 HOSPITAL OUTPT CLINIC VISIT: HCPCS | Performed by: FAMILY MEDICINE

## 2021-05-17 PROCEDURE — 87086 URINE CULTURE/COLONY COUNT: CPT

## 2021-05-17 PROCEDURE — 87077 CULTURE AEROBIC IDENTIFY: CPT

## 2021-05-17 PROCEDURE — 81002 URINALYSIS NONAUTO W/O SCOPE: CPT

## 2021-05-17 PROCEDURE — 99213 OFFICE O/P EST LOW 20 MIN: CPT | Performed by: FAMILY MEDICINE

## 2021-05-17 PROCEDURE — 87186 SC STD MICRODIL/AGAR DIL: CPT

## 2021-05-17 RX ORDER — CEPHALEXIN 500 MG/1
500 CAPSULE ORAL 2 TIMES DAILY
Qty: 14 CAPSULE | Refills: 0 | Status: SHIPPED | OUTPATIENT
Start: 2021-05-17 | End: 2021-05-24

## 2021-05-17 RX ORDER — CEPHALEXIN 500 MG/1
500 CAPSULE ORAL 2 TIMES DAILY
Qty: 14 CAPSULE | Refills: 0 | Status: SHIPPED | OUTPATIENT
Start: 2021-05-17 | End: 2021-05-17

## 2021-05-17 NOTE — ASSESSMENT & PLAN NOTE
Frequency and dysuria since yesterday  He denies  pelvic or rectal pain, nausea, vomiting or flank pain  Sxs likely from UTI but I did discuss with patient that prostatitis can cause the same sxs and he does have increased risk given his  urological hx  Urine dip significant for large leukocytes, blood  and  + nitrites  Start keflex 500mg bid x 7days   Will also send urine out for culture  Follow up with PCP if  Not improving in next 3-5 days  ED precautions  discussed

## 2021-05-17 NOTE — PROGRESS NOTES
Assessment/Plan:    Urinary tract infection with hematuria  Frequency and dysuria since yesterday  He denies  pelvic or rectal pain, nausea, vomiting or flank pain  Sxs likely from UTI but I did discuss with patient that prostatitis can cause the same sxs and he does have increased risk given his  urological hx  Urine dip significant for large leukocytes, blood  and  + nitrites  Start keflex 500mg bid x 7days   Will also send urine out for culture  Follow up with PCP if  Not improving in next 3-5 days  ED precautions  discussed  Problem List Items Addressed This Visit        Genitourinary    Urinary tract infection with hematuria - Primary     Frequency and dysuria since yesterday  He denies  pelvic or rectal pain, nausea, vomiting or flank pain  Sxs likely from UTI but I did discuss with patient that prostatitis can cause the same sxs and he does have increased risk given his  urological hx  Urine dip significant for large leukocytes, blood  and  + nitrites  Start keflex 500mg bid x 7days   Will also send urine out for culture  Follow up with PCP if  Not improving in next 3-5 days  ED precautions  discussed  Relevant Medications    cephalexin (KEFLEX) 500 mg capsule      Other Visit Diagnoses     Urinary frequency        Relevant Orders    POCT urine dip (Completed)    Urine culture            Subjective:      Patient ID: Jyotsna Yun is a 74 y o  male  Pain is with urination only  Denies abdominal pain , pelvic or rectal pain/discomfort  No nausea or  Vomitting  Urinary Tract Infection   This is a new problem  The current episode started yesterday  The problem has been unchanged  The quality of the pain is described as burning  There has been no fever  He is not sexually active  There is no history of pyelonephritis  Associated symptoms include chills and frequency   Pertinent negatives include no discharge, flank pain, hematuria, hesitancy, nausea, possible pregnancy, sweats, urgency or vomiting  He has tried nothing for the symptoms  There is no history of catheterization, kidney stones, recurrent UTIs, a single kidney, urinary stasis or a urological procedure  Hx BPH and adenocarcinoma of prostate active surveillance for 10 years without evidence of progressive        The following portions of the patient's history were reviewed and updated as appropriate: allergies, current medications, past family history, past medical history, past social history, past surgical history and problem list     Review of Systems   Constitutional: Positive for chills  Negative for activity change, appetite change, diaphoresis, fatigue, fever and unexpected weight change  Gastrointestinal: Negative for abdominal pain, constipation, nausea and vomiting  Genitourinary: Positive for dysuria and frequency  Negative for difficulty urinating, discharge, flank pain, hematuria, hesitancy, penile pain, penile swelling, scrotal swelling, testicular pain and urgency  All other systems reviewed and are negative  Objective:      /64   Pulse (!) 110   Temp 100 °F (37 8 °C) (Oral)   Resp 18   SpO2 96%          Physical Exam  Vitals signs and nursing note reviewed  Constitutional:       General: He is not in acute distress  Appearance: Normal appearance  He is not ill-appearing or toxic-appearing  Neck:      Musculoskeletal: Normal range of motion and neck supple  Cardiovascular:      Rate and Rhythm: Normal rate and regular rhythm  Pulses: Normal pulses  Heart sounds: Normal heart sounds  Pulmonary:      Effort: Pulmonary effort is normal       Breath sounds: Normal breath sounds  Abdominal:      General: There is no distension  Palpations: Abdomen is soft  There is no mass  Tenderness: There is no abdominal tenderness  There is no right CVA tenderness, left CVA tenderness, guarding or rebound  Hernia: No hernia is present     Neurological:      Mental Status: He is alert

## 2021-05-18 ENCOUNTER — HOSPITAL ENCOUNTER (EMERGENCY)
Facility: HOSPITAL | Age: 66
Discharge: HOME | End: 2021-05-18
Attending: EMERGENCY MEDICINE
Payer: MEDICARE

## 2021-05-18 ENCOUNTER — TELEPHONE (OUTPATIENT)
Dept: UROLOGY | Facility: MEDICAL CENTER | Age: 66
End: 2021-05-18

## 2021-05-18 ENCOUNTER — TELEPHONE (OUTPATIENT)
Dept: OTHER | Facility: OTHER | Age: 66
End: 2021-05-18

## 2021-05-18 VITALS
HEART RATE: 89 BPM | WEIGHT: 229.28 LBS | BODY MASS INDEX: 30.25 KG/M2 | RESPIRATION RATE: 18 BRPM | SYSTOLIC BLOOD PRESSURE: 128 MMHG | TEMPERATURE: 98.2 F | DIASTOLIC BLOOD PRESSURE: 76 MMHG | OXYGEN SATURATION: 98 %

## 2021-05-18 DIAGNOSIS — R33.9 URINARY RETENTION: Primary | ICD-10-CM

## 2021-05-18 PROCEDURE — 99282 EMERGENCY DEPT VISIT SF MDM: CPT | Performed by: EMERGENCY MEDICINE

## 2021-05-18 PROCEDURE — 99283 EMERGENCY DEPT VISIT LOW MDM: CPT

## 2021-05-18 NOTE — TELEPHONE ENCOUNTER
Call placed to patient and spoke with him  Pt stated that he just arrived at the ER and will seek care there  He will contact the office for further follow up once he is discharged from the ER

## 2021-05-18 NOTE — TELEPHONE ENCOUNTER
Maintain Linares catheter x1 week and have patient come to the office for Linares catheter removal and void trial

## 2021-05-18 NOTE — ED PROVIDER NOTES
History  Chief Complaint   Patient presents with    Urinary Frequency     Pt reports urinary frequency for 2 days  pt reports he does not feel like he is emptying all the way  Pt reports he yosi to urgent care yesterday and was dx with a UTI however no further testing was done  History provided by:  Patient   used: No    Urinary Frequency  Quality:  Urinary urgency and frequency  Severity:  Moderate  Onset quality:  Gradual  Duration:  2 days  Timing:  Intermittent  Progression:  Waxing and waning  Chronicity:  New  Context:  Urine with luecs, Nitrites, started on Keflex yesterday at , Cx pending  Relieved by:  Nothing  Worsened by:  Nothing  Ineffective treatments:  None  Associated symptoms: no abdominal pain, no chest pain, no cough, no diarrhea, no fever, no headaches, no loss of consciousness, no nausea, no rash, no shortness of breath, no sore throat and no vomiting    Risk factors:  BPH      Prior to Admission Medications   Prescriptions Last Dose Informant Patient Reported? Taking?    Alirocumab (Praluent) 150 MG/ML SOAJ   No No   Sig: Inject 150 mg under the skin every 14 (fourteen) days   [employer] ASPIRIN ADULT LOW DOSE 81 MG chewable tablet  Self Yes No   Sig: daily   Evolocumab (Repatha SureClick) 660 MG/ML SOAJ   Yes No   Vascepa 1 g CAPS   No No   Sig: TAKE 2 CAPSULES TWICE DAILY   cephalexin (KEFLEX) 500 mg capsule   No No   Sig: Take 1 capsule (500 mg total) by mouth 2 (two) times a day for 7 days   clopidogrel (PLAVIX) 75 mg tablet  Self Yes No   Sig: Take 75 mg by mouth   doxazosin (CARDURA) 8 MG tablet   No No   Sig: TAKE 1 TABLET BY MOUTH DAILY   Patient not taking: Reported on 12/1/2020   lisinopril (ZESTRIL) 5 mg tablet  Self Yes No   Sig: Take 2 5 mg by mouth daily    loratadine (CLARITIN) 10 mg tablet  Self Yes No   Sig: Take 10 mg by mouth daily   metoprolol succinate (TOPROL-XL) 25 mg 24 hr tablet  Self Yes No   Sig: Take 25 mg by mouth    tadalafil (CIALIS) 20 MG tablet   No No   Sig: Take 1 tablet (20 mg total) by mouth daily as needed for erectile dysfunction   tamsulosin (FLOMAX) 0 4 mg   No No   Sig: Take 1 capsule (0 4 mg total) by mouth 2 (two) times a day   traMADol (ULTRAM) 50 mg tablet   No No   Sig: Take 2 tablets (100 mg total) by mouth as needed for moderate pain      Facility-Administered Medications: None       Past Medical History:   Diagnosis Date    Acute ST elevation myocardial infarction (STEMI) involving left anterior descending (LAD) coronary artery ([facility] ) 2016    BPH with urinary obstruction     Elevated PSA     Erectile dysfunction     Gout     History of colonoscopy 2018    Hypercholesteremia     Hypertension     Malignant neoplasm prostate ([facility] )     Pulmonary embolism ([facility] )     Right inguinal hernia        Past Surgical History:   Procedure Laterality Date    PROSTATE BIOPSY Bilateral     TONSILLECTOMY         Family History   Problem Relation Age of Onset    Cancer Family         Bladder cancer    Diabetes Family     Heart attack Family     Hypertension Family      I have reviewed and agree with the history as documented  E-Cigarette/Vaping     E-Cigarette/Vaping Substances     Social History     Tobacco Use    Smoking status: Former Smoker     Types: Cigarettes     Quit date:      Years since quittin 4    Smokeless tobacco: Never Used   Substance Use Topics    Alcohol use: Yes     Alcohol/week: 1 0 standard drinks     Types: 1 Cans of beer per week     Comment: rarely    Drug use: No       Review of Systems   Constitutional: Negative for chills and fever  HENT: Negative for facial swelling, sore throat and trouble swallowing  Eyes: Negative for pain and visual disturbance  Respiratory: Negative for cough, chest tightness and shortness of breath  Cardiovascular: Negative for chest pain and leg swelling  Gastrointestinal: Negative for abdominal pain, diarrhea, nausea and vomiting  Genitourinary: Positive for frequency  Negative for dysuria and flank pain  Musculoskeletal: Negative for back pain, neck pain and neck stiffness  Skin: Negative for pallor and rash  Allergic/Immunologic: Negative for environmental allergies and immunocompromised state  Neurological: Negative for dizziness, loss of consciousness and headaches  Hematological: Negative for adenopathy  Does not bruise/bleed easily  Psychiatric/Behavioral: Negative for agitation and behavioral problems  All other systems reviewed and are negative  Physical Exam  Physical Exam  Vitals signs and nursing note reviewed  Constitutional:       General: He is not in acute distress  Appearance: He is well-developed  HENT:      Head: Normocephalic and atraumatic  Eyes:      Extraocular Movements: Extraocular movements intact  Neck:      Musculoskeletal: Normal range of motion and neck supple  Cardiovascular:      Rate and Rhythm: Normal rate and regular rhythm  Pulmonary:      Effort: Pulmonary effort is normal  No respiratory distress  Abdominal:      Palpations: Abdomen is soft  Tenderness: There is no abdominal tenderness  There is no guarding or rebound  Comments: No CVA tenderness   Musculoskeletal: Normal range of motion  Skin:     General: Skin is warm and dry  Neurological:      General: No focal deficit present  Mental Status: He is alert and oriented to person, place, and time           Vital Signs  ED Triage Vitals [05/18/21 0848]   Temperature Pulse Respirations Blood Pressure SpO2   98 2 °F (36 8 °C) (!) 125 18 136/72 95 %      Temp Source Heart Rate Source Patient Position - Orthostatic VS BP Location FiO2 (%)   Oral -- Sitting Right arm --      Pain Score       --           Vitals:    05/18/21 0848   BP: 136/72   Pulse: (!) 125   Patient Position - Orthostatic VS: Sitting         Visual Acuity      ED Medications  Medications - No data to display    Diagnostic Studies  Results Reviewed     None                 No orders to display              Procedures  Procedures         ED Course  ED Course as of May 18 0951   Tue May 18, 2021   0907 Bladder scan 421  Patient uncomfortable, with urgency and frequency, not able to empty bladder, we will put Linares  3555 Linares inserted by RN, 800 ml urine drained  MDM  Number of Diagnoses or Management Options  Diagnosis management comments: Impression: 73 yo male, hx of BPH, with urinary frequency and uregncy, UTI on dip at [facility] yesterday, unable to empty bladder, scan 421 ml, we will put Linares  Disposition  Final diagnoses:   Urinary retention     Time reflects when diagnosis was documented in both MDM as applicable and the Disposition within this note     Time User Action Codes Description Comment    5/18/2021  9:18 AM Gloria Patel Add [R33 9] Urinary retention       ED Disposition     ED Disposition Condition Date/Time Comment    Discharge Stable Tue May 18, 2021  9:30 AM Juana Minus discharge to home/self care  Follow-up Information     Follow up With Specialties Details Why Contact Info    Eric Pugh MD Urology Schedule an appointment as soon as possible for a visit   [address] [place], [place] [phone]            Patient's Medications   Discharge Prescriptions    No medications on file     No discharge procedures on file      PDMP Review     None          ED Provider  Electronically Signed by           Josh Peraza MD  05/18/21 4995

## 2021-05-18 NOTE — TELEPHONE ENCOUNTER
Patient went to a Urgent Care yesterday for a urine test   They recommended having a catheter placed, and calling his urologist   Patient is having a difficult time urinating  Please call to discuss concerns

## 2021-05-18 NOTE — TELEPHONE ENCOUNTER
Patient of Dr Larry Jain seen in the [facility] office  Patient called and advised that he was in the ED today for urinary retention  and needs a follow up appointment  Please advise

## 2021-05-18 NOTE — TELEPHONE ENCOUNTER
Call placed to patient and spoke with him  Informed him of the recommendations of the CRNP at this time  Pt understands and is scheduled for 5- at 8am for a voiding trial with the nurse  Pt also asked if Dr Wally lBank would be able to prescribe something different for his OAB  Prior to the nuno catheter being in place, patient was having increased urgency and frequency and has been having to wake up at night at least 6-8 times to urinate  He states that the Flomax is not helping him  He is asking if there is anything else he can try to  Help with his symptoms  Will route to the provider for further review and recommendations

## 2021-05-18 NOTE — TELEPHONE ENCOUNTER
I would refrain from utilizing the anticholinergic agents at this time as that may have been a callus to the urinary retention  And see if the patient is able to pass the void trial at the Linares catheters removed and we can re-evaluate symptoms at that time

## 2021-05-18 NOTE — TELEPHONE ENCOUNTER
Patient managed by Padmini Mckeon is calling to say he is having a hard time urinating  He would like to come into office to get catheter  He is not sure if he needs to go to emergency room

## 2021-05-19 NOTE — TELEPHONE ENCOUNTER
Call placed to patient and spoke with him  Informed him of the recommendations of the CRNP at this time and patient understands and agrees  He will wait until voiding trial is completed to discuss further medication alternatives  Pt did also state that he is having what he believes to be bladder spasms  He said the pain is about a 2 or 3 when he experiences these spasms  Encouraged patient to increase his hydration with water especially and to monitor his symptoms  If they worsen or he has any concerns, advised that he contact the office  Pt verbalized his understanding and will contact the office with any further questions or concerns he should have  ,

## 2021-05-20 LAB — BACTERIA UR CULT: ABNORMAL

## 2021-05-22 ENCOUNTER — NURSE TRIAGE (OUTPATIENT)
Dept: OTHER | Facility: OTHER | Age: 66
End: 2021-05-22

## 2021-05-22 DIAGNOSIS — N32.89 BLADDER SPASMS: Primary | ICD-10-CM

## 2021-05-22 DIAGNOSIS — I21.02 ST ELEVATION MYOCARDIAL INFARCTION INVOLVING LEFT ANTERIOR DESCENDING (LAD) CORONARY ARTERY (HCC): Primary | ICD-10-CM

## 2021-05-22 RX ORDER — OXYBUTYNIN CHLORIDE 5 MG/1
5 TABLET ORAL 3 TIMES DAILY PRN
Qty: 10 TABLET | Refills: 0 | Status: SHIPPED | OUTPATIENT
Start: 2021-05-22 | End: 2021-05-28

## 2021-05-22 NOTE — TELEPHONE ENCOUNTER
Reason for Disposition   Leakage of urine around catheter    Answer Assessment - Initial Assessment Questions  1  SYMPTOMS: "What symptoms are you concerned about?"      Spasms  2  ONSET:  "When did the symptoms start?"      Continued from ER visit last Sunday  3  FEVER: "Is there a fever?" If so, ask: "What is the temperature, how was it measured, and when did it start?"      Denies  4  ABDOMINAL PAIN: "Is there any abdominal pain?" (e g , Scale 1-10; or mild, moderate, severe)      5/10, with spasms  5  URINE COLOR: "What color is the urine?"  "Is there blood present in the urine?" (e g , clear, yellow, cloudy, tea-colored, blood streaks, bright red)      Yellow  6  ONSET: "When was the catheter inserted?"      5/19  7   OTHER SYMPTOMS: "Do you have any other symptoms?" (e g , back pain, bad urine odor)       Denies    Protocols used: URINARY CATHETER SYMPTOMS AND QUESTIONS-ADULT-AH
Regardin73 Y/O BLADDER SPASM PAIN NEEDS RX  ----- Message from Christ Romberg sent at 2021  2:04 PM EDT -----  "My father was seen in the ER a week ago and had a procedure done, he is having a lot of pain and bladder spasm   She wants to see if something can be called in to relieve them "
Pt needs blood transfusions. Out patient facility couldn't get IV access.

## 2021-05-24 DIAGNOSIS — I21.02 ST ELEVATION MYOCARDIAL INFARCTION INVOLVING LEFT ANTERIOR DESCENDING (LAD) CORONARY ARTERY (HCC): ICD-10-CM

## 2021-05-24 DIAGNOSIS — I25.5 CARDIOMYOPATHY, ISCHEMIC: Primary | ICD-10-CM

## 2021-05-24 RX ORDER — EVOLOCUMAB 140 MG/ML
INJECTION, SOLUTION SUBCUTANEOUS
Qty: 15 ML | Refills: 6 | Status: SHIPPED | OUTPATIENT
Start: 2021-05-24 | End: 2021-05-24

## 2021-05-24 RX ORDER — CLOPIDOGREL BISULFATE 75 MG/1
75 TABLET ORAL DAILY
Qty: 90 TABLET | Refills: 1 | Status: SHIPPED | OUTPATIENT
Start: 2021-05-24 | End: 2021-11-19

## 2021-05-24 RX ORDER — ALIROCUMAB 75 MG/ML
INJECTION, SOLUTION SUBCUTANEOUS
Qty: 15 ML | Refills: 3 | Status: SHIPPED | OUTPATIENT
Start: 2021-05-24 | End: 2021-06-09

## 2021-05-24 NOTE — TELEPHONE ENCOUNTER
KYARAI - Pt called asking for his catheter to be removed sooner than 5/26,I reviewed nurse schedule and informed pt at the moment 5/26 is the soonest

## 2021-05-25 DIAGNOSIS — M1A.0690 IDIOPATHIC CHRONIC GOUT OF KNEE WITHOUT TOPHUS, UNSPECIFIED LATERALITY: ICD-10-CM

## 2021-05-25 RX ORDER — TRAMADOL HYDROCHLORIDE 50 MG/1
100 TABLET ORAL AS NEEDED
Qty: 30 TABLET | Refills: 1 | Status: SHIPPED | OUTPATIENT
Start: 2021-05-25 | End: 2021-07-13 | Stop reason: SDUPTHER

## 2021-05-26 ENCOUNTER — PROCEDURE VISIT (OUTPATIENT)
Dept: UROLOGY | Facility: MEDICAL CENTER | Age: 66
End: 2021-05-26
Payer: MEDICARE

## 2021-05-26 ENCOUNTER — TELEPHONE (OUTPATIENT)
Dept: UROLOGY | Facility: MEDICAL CENTER | Age: 66
End: 2021-05-26

## 2021-05-26 VITALS
WEIGHT: 233 LBS | BODY MASS INDEX: 30.88 KG/M2 | HEART RATE: 105 BPM | HEIGHT: 73 IN | DIASTOLIC BLOOD PRESSURE: 88 MMHG | SYSTOLIC BLOOD PRESSURE: 136 MMHG

## 2021-05-26 DIAGNOSIS — N40.1 BPH WITH OBSTRUCTION/LOWER URINARY TRACT SYMPTOMS: Primary | ICD-10-CM

## 2021-05-26 DIAGNOSIS — N13.8 BPH WITH OBSTRUCTION/LOWER URINARY TRACT SYMPTOMS: Primary | ICD-10-CM

## 2021-05-26 DIAGNOSIS — C61 MALIGNANT NEOPLASM OF PROSTATE (HCC): ICD-10-CM

## 2021-05-26 LAB
POST-VOID RESIDUAL VOLUME, ML POC: 218 ML
SL AMB  POCT GLUCOSE, UA: NORMAL
SL AMB LEUKOCYTE ESTERASE,UA: NORMAL
SL AMB POCT BILIRUBIN,UA: NORMAL
SL AMB POCT BLOOD,UA: NORMAL
SL AMB POCT CLARITY,UA: NORMAL
SL AMB POCT COLOR,UA: YELLOW
SL AMB POCT KETONES,UA: NORMAL
SL AMB POCT NITRITE,UA: POSITIVE
SL AMB POCT PH,UA: 5.5
SL AMB POCT SPECIFIC GRAVITY,UA: 1.02
SL AMB POCT URINE PROTEIN: NORMAL
SL AMB POCT UROBILINOGEN: 0.2

## 2021-05-26 PROCEDURE — 87086 URINE CULTURE/COLONY COUNT: CPT | Performed by: UROLOGY

## 2021-05-26 PROCEDURE — 81003 URINALYSIS AUTO W/O SCOPE: CPT

## 2021-05-26 PROCEDURE — 87186 SC STD MICRODIL/AGAR DIL: CPT | Performed by: UROLOGY

## 2021-05-26 PROCEDURE — 51798 US URINE CAPACITY MEASURE: CPT

## 2021-05-26 PROCEDURE — 99211 OFF/OP EST MAY X REQ PHY/QHP: CPT

## 2021-05-26 PROCEDURE — 87077 CULTURE AEROBIC IDENTIFY: CPT | Performed by: UROLOGY

## 2021-05-26 NOTE — PROGRESS NOTES
5/26/2021    John Lu  1955  [id number]    Diagnosis  Chief Complaint     Prostate Cancer; Urinary Retention          Patient presents for nuno catheter removal managed by Dr Ashley Bethea  Return to the office this afternoon for PVR s/p nuno catheter removal      Procedure Nuno removal/voiding trial    Nuno catheter removed after deflation of an intact balloon  Patient tolerated well  Encouraged patient to hydrate well and return this afternoon for post void residual   he knows he may return early if uncomfortable and unable to urinate  Patient agrees to this plan            Vitals:    05/26/21 0844   BP: 136/88   BP Location: Left arm   Patient Position: Sitting   Cuff Size: Adult   Pulse: 105   Weight: 106 kg (233 lb)   Height: 6' 1" (1 854 m)           Amelia Troy RN

## 2021-05-26 NOTE — PROGRESS NOTES
5/26/2021    Maria M Powell  [birth year]  [id number]    Diagnosis  Chief Complaint     Prostate Cancer; Urinary Retention          Patient presents for Post Void Residual s/p nuno removal this morning managed by Dr Sadi Joshi  Call office or proceed to ED if unable to void  Procedure:  Post Void Residual     Patient returned this afternoon  Stated he was able to void but did not feel he was emptying his bladder  Patient voided while in office  Bladder ultrasound performed and PVR measured 218 ml  Consulted Dr Alejandrina Milian  He advised not placing nuno at this time  To wait and see if pt's urinary stream improves overnight  Pt advised to call on call provider or proceed to ED if uncomfortable and unable to urinate  UA and UC performed  Dr Alejandrina Milian advised not placing pt on antibiotic at this time  Pt has appt with Dr Roque Alanis on 6/25/21 for urinary retention      Recent Results (from the past 4 hour(s))   POCT urine dip auto non-scope    Collection Time: 05/26/21  3:32 PM   Result Value Ref Range     COLOR,UA yellow     CLARITY,UA cloudy     SPECIFIC GRAVITY,UA 1 020      PH,UA 5 5     LEUKOCYTE ESTERASE,UA large     NITRITE,UA positive     GLUCOSE,  mg     KETONES,UA neg     BILIRUBIN,UA neg     BLOOD,UA large     POCT URINE PROTEIN 100 mg     SL AMB POCT UROBILINOGEN 0 2    POCT Measure PVR    Collection Time: 05/26/21  3:34 PM   Result Value Ref Range    POST-VOID RESIDUAL VOLUME, ML  mL           Vitals:    05/26/21 0844   BP: 136/88   BP Location: Left arm   Patient Position: Sitting   Cuff Size: Adult   Pulse: 105   Weight: 106 kg (233 lb)   Height: 6' 1" (1 854 m)           Hema Marks RN

## 2021-05-27 ENCOUNTER — TELEPHONE (OUTPATIENT)
Dept: UROLOGY | Facility: AMBULATORY SURGERY CENTER | Age: 66
End: 2021-05-27

## 2021-05-27 NOTE — TELEPHONE ENCOUNTER
Patient had his catheter removed yesterday and is having urinary retention  His output less and he is drinking less because he is having very painful buring urination  He is also reporting constipation

## 2021-05-27 NOTE — TELEPHONE ENCOUNTER
Contacted and spoke with patient  Patient states he has been passing his urine but the stream is very weak and slow  Drinking less secondary to poor output  Patient denies abdominal bloating/distention  Recommended nuno catheter to be placed but patient refuses  Patient admits to constipation  Suggested Miralax daily and stool softeners 2x/day  Patient feels he needs testing done to determine reason for retention/ incomplete bladder emptying    Patient willing to see another provider other than Dr Janelle Velasquez  Patient scheduled tomorrow with Dr Armand Morris at 115 pm

## 2021-05-28 ENCOUNTER — OFFICE VISIT (OUTPATIENT)
Dept: UROLOGY | Facility: MEDICAL CENTER | Age: 66
End: 2021-05-28
Payer: MEDICARE

## 2021-05-28 VITALS
HEART RATE: 67 BPM | HEIGHT: 73 IN | SYSTOLIC BLOOD PRESSURE: 124 MMHG | BODY MASS INDEX: 30.88 KG/M2 | WEIGHT: 233 LBS | DIASTOLIC BLOOD PRESSURE: 78 MMHG

## 2021-05-28 DIAGNOSIS — C61 MALIGNANT NEOPLASM OF PROSTATE (HCC): ICD-10-CM

## 2021-05-28 DIAGNOSIS — N13.8 BPH WITH OBSTRUCTION/LOWER URINARY TRACT SYMPTOMS: Primary | ICD-10-CM

## 2021-05-28 DIAGNOSIS — R33.9 URINARY RETENTION: ICD-10-CM

## 2021-05-28 DIAGNOSIS — N40.1 BPH WITH OBSTRUCTION/LOWER URINARY TRACT SYMPTOMS: Primary | ICD-10-CM

## 2021-05-28 DIAGNOSIS — N30.00 ACUTE CYSTITIS WITHOUT HEMATURIA: ICD-10-CM

## 2021-05-28 LAB
BACTERIA UR CULT: ABNORMAL
POST-VOID RESIDUAL VOLUME, ML POC: 54 ML
SL AMB  POCT GLUCOSE, UA: ABNORMAL
SL AMB LEUKOCYTE ESTERASE,UA: ABNORMAL
SL AMB POCT BILIRUBIN,UA: ABNORMAL
SL AMB POCT BLOOD,UA: ABNORMAL
SL AMB POCT CLARITY,UA: ABNORMAL
SL AMB POCT COLOR,UA: ABNORMAL
SL AMB POCT KETONES,UA: ABNORMAL
SL AMB POCT NITRITE,UA: POSITIVE
SL AMB POCT PH,UA: 5
SL AMB POCT SPECIFIC GRAVITY,UA: 1.02
SL AMB POCT URINE PROTEIN: 30
SL AMB POCT UROBILINOGEN: 0.2

## 2021-05-28 PROCEDURE — 99214 OFFICE O/P EST MOD 30 MIN: CPT | Performed by: UROLOGY

## 2021-05-28 PROCEDURE — 87077 CULTURE AEROBIC IDENTIFY: CPT | Performed by: UROLOGY

## 2021-05-28 PROCEDURE — 87186 SC STD MICRODIL/AGAR DIL: CPT | Performed by: UROLOGY

## 2021-05-28 PROCEDURE — 51798 US URINE CAPACITY MEASURE: CPT | Performed by: UROLOGY

## 2021-05-28 PROCEDURE — 87086 URINE CULTURE/COLONY COUNT: CPT | Performed by: UROLOGY

## 2021-05-28 PROCEDURE — 81003 URINALYSIS AUTO W/O SCOPE: CPT | Performed by: UROLOGY

## 2021-05-28 RX ORDER — CIPROFLOXACIN 500 MG/1
500 TABLET, FILM COATED ORAL EVERY 12 HOURS SCHEDULED
Qty: 14 TABLET | Refills: 0 | Status: SHIPPED | OUTPATIENT
Start: 2021-05-28 | End: 2021-06-04

## 2021-05-28 NOTE — PROGRESS NOTES
Assessment/Plan:  1  BPH with lower urinary tract obstructive symptoms-patient has been treated with pharmacologic therapy 1st with doxazosin with initially good symptomatic results but with orthostasis  The patient went to Flomax and noted that that was not as effective which ultimately resulted in a b i d  Dosage which again has not improve the patient's voiding pattern  The patient still has significant voiding issues with an AUA symptom score of 30+ but in the infected state  Cipro 500 mg p o  B i d  for 7 days ordered with repeat culture to screen for response  Eventual cystoscopy PVR possible transrectal ultrasound measurement of the prostate  Uro lift for sure given  TURP discussed  2  Acute cystitis without hematuria-repeat urine culture before and after treatment with treatment as above  3  [place] pattern score 6 adenocarcinoma the prostate on surveillance protocol  Plan eventual repeat PSA with multiparametric MRI  Consideration towards repeat biopsy will be made in the future depending upon these results  4  Urinary retention-PVR 54 cc however the patient had an 800 cc episode of retention back on the 17th of May when he was catheterized in the emergency room  5  Erectile dysfunction-patient utilizing PDE5 inhibitors Cialis successful        No problem-specific Assessment & Plan notes found for this encounter  Diagnoses and all orders for this visit:    BPH with obstruction/lower urinary tract symptoms  -     POCT urine dip auto non-scope  -     POCT Measure PVR  -     Basic metabolic panel; Future  -     Uroflow w/ post void residual; Future  -     Cystoscopy; Future    Acute cystitis without hematuria  -     ciprofloxacin (CIPRO) 500 mg tablet; Take 1 tablet (500 mg total) by mouth every 12 (twelve) hours for 7 days  -     Basic metabolic panel; Future  -     Urine culture; Future  -     Cystoscopy;  Future    Malignant neoplasm of prostate [facility])    Urinary retention Subjective:      Patient ID: Sebastian Damian is a 74 y o  male  HPI  68-year-old male with a history of adenocarcinoma the prostate on surveillance for Denisha pattern score did prostate biopsy over 10 years ago  The patient has had a stable PSA  On 05/16 he developed frequency and urgency feeling overall poorly presenting to the emergency room 517 unable to void and cathed for approximately 800 cc  Started on Keflex b i d  For positive urine culture but presents with urgency frequency and an AUA symptom score of 30 today with urinalysis positive for infection  The patient presents for evaluation and treatment as above  The following portions of the patient's history were reviewed and updated as appropriate: allergies, current medications, past family history, past medical history, past social history, past surgical history and problem list     Review of Systems   Genitourinary: Positive for difficulty urinating, dysuria, frequency and urgency  All other systems reviewed and are negative  Objective:      /78   Pulse 67   Ht 6' 1" (1 854 m)   Wt 106 kg (233 lb)   BMI 30 74 kg/m²          Physical Exam  Vitals signs reviewed  Constitutional:       General: He is not in acute distress  Appearance: Normal appearance  He is not ill-appearing, toxic-appearing or diaphoretic  HENT:      Head: Normocephalic and atraumatic  Nose: Nose normal       Mouth/Throat:      Mouth: Mucous membranes are moist    Eyes:      Extraocular Movements: Extraocular movements intact  Neck:      Musculoskeletal: Normal range of motion  No neck rigidity or muscular tenderness  Pulmonary:      Effort: Pulmonary effort is normal  No respiratory distress  Abdominal:      Palpations: Abdomen is soft  Skin:     General: Skin is dry  Neurological:      General: No focal deficit present  Mental Status: He is alert and oriented to person, place, and time     Psychiatric:         Mood and Affect: Mood normal          Behavior: Behavior normal          Thought Content:  Thought content normal          Judgment: Judgment normal

## 2021-05-30 LAB — BACTERIA UR CULT: ABNORMAL

## 2021-06-07 DIAGNOSIS — N30.00 ACUTE CYSTITIS WITHOUT HEMATURIA: Primary | ICD-10-CM

## 2021-06-08 ENCOUNTER — NURSE TRIAGE (OUTPATIENT)
Dept: OTHER | Facility: OTHER | Age: 66
End: 2021-06-08

## 2021-06-08 ENCOUNTER — APPOINTMENT (OUTPATIENT)
Dept: LAB | Facility: MEDICAL CENTER | Age: 66
End: 2021-06-08
Payer: MEDICARE

## 2021-06-08 DIAGNOSIS — N30.00 ACUTE CYSTITIS WITHOUT HEMATURIA: ICD-10-CM

## 2021-06-08 PROCEDURE — 87086 URINE CULTURE/COLONY COUNT: CPT

## 2021-06-09 ENCOUNTER — PROCEDURE VISIT (OUTPATIENT)
Dept: UROLOGY | Facility: MEDICAL CENTER | Age: 66
End: 2021-06-09
Payer: MEDICARE

## 2021-06-09 ENCOUNTER — TELEPHONE (OUTPATIENT)
Dept: UROLOGY | Facility: MEDICAL CENTER | Age: 66
End: 2021-06-09

## 2021-06-09 VITALS
DIASTOLIC BLOOD PRESSURE: 78 MMHG | HEART RATE: 65 BPM | SYSTOLIC BLOOD PRESSURE: 115 MMHG | BODY MASS INDEX: 30.22 KG/M2 | HEIGHT: 73 IN | WEIGHT: 228 LBS

## 2021-06-09 DIAGNOSIS — N40.1 BPH WITH OBSTRUCTION/LOWER URINARY TRACT SYMPTOMS: Primary | ICD-10-CM

## 2021-06-09 DIAGNOSIS — N13.8 BPH WITH URINARY OBSTRUCTION: ICD-10-CM

## 2021-06-09 DIAGNOSIS — N21.0 BLADDER STONE: Primary | ICD-10-CM

## 2021-06-09 DIAGNOSIS — N40.1 BPH WITH URINARY OBSTRUCTION: ICD-10-CM

## 2021-06-09 DIAGNOSIS — N13.8 BPH WITH OBSTRUCTION/LOWER URINARY TRACT SYMPTOMS: Primary | ICD-10-CM

## 2021-06-09 DIAGNOSIS — N30.00 ACUTE CYSTITIS WITHOUT HEMATURIA: Primary | ICD-10-CM

## 2021-06-09 DIAGNOSIS — R39.15 URGENCY OF URINATION: ICD-10-CM

## 2021-06-09 LAB
BACTERIA UR CULT: NORMAL
POST-VOID RESIDUAL VOLUME, ML POC: 195 ML

## 2021-06-09 PROCEDURE — 99214 OFFICE O/P EST MOD 30 MIN: CPT | Performed by: UROLOGY

## 2021-06-09 PROCEDURE — 51798 US URINE CAPACITY MEASURE: CPT

## 2021-06-09 PROCEDURE — 52000 CYSTOURETHROSCOPY: CPT | Performed by: UROLOGY

## 2021-06-09 RX ORDER — SULFAMETHOXAZOLE AND TRIMETHOPRIM 800; 160 MG/1; MG/1
1 TABLET ORAL EVERY 12 HOURS SCHEDULED
Qty: 14 TABLET | Refills: 0 | Status: SHIPPED | OUTPATIENT
Start: 2021-06-09 | End: 2021-06-16

## 2021-06-09 NOTE — TELEPHONE ENCOUNTER
I spoke to the patient and confirmed we are scheduling his Cystolitholopaxy for 6/15/2021 at [facility] with Dr Noa Carroll     -instructions given verbally  -PAT testing is complete  -[employer] - no auth required

## 2021-06-09 NOTE — TELEPHONE ENCOUNTER
Regarding: pain and trouble w/urination (Urology)  ----- Message from Mini Cohen sent at 6/8/2021  8:41 PM EDT -----  "I am having so much pain and trouble urinating; I am still awaiting my urine culture results "

## 2021-06-09 NOTE — TELEPHONE ENCOUNTER
Pt recently treated for a UTI last week and was feeling better until yesterday when he started with frequent urination and severe pain when urinating  Pt stated he has the urge to urinate every 15-30mins and having to push really hard to void  Pt has been in contact with his urologist about his symptoms and they ordered a urine culture  Culture still pending  Pt stated his symptoms are much worse now  TT out to on call provider to make aware  Per Dr Cj Chun pt is to be seen in the ED if having difficulty with urination  Spoke with pt, pt is aware of the providers recommendations  Pt stated he will decide if he wants to go to the ED or not

## 2021-06-09 NOTE — TELEPHONE ENCOUNTER
----- Message from Mindy Chowdhury MD sent at 6/9/2021  1:12 PM EDT -----  Bladder stone on cysto today  Terrible bladder symptoms  I might have a cancellation next Tuesday, Yusef Garcia, his CT will be read later today, I think he has passed the stone    So, if we cancel bann, put in Luwana Salts

## 2021-06-09 NOTE — TELEPHONE ENCOUNTER
Spoke to pt experiencing "severe" pain, frequent urination (every 15 mins), straining to urinate  Pt is s/p e coli UTI from 5/28 for which he received Cipro for 7 days  He states symptoms he is experiencing now are the same as before  He states he is able to pass his urine and urine is clear yellow  He has UC pending  Pt has appt on 8/5 for cysto  Forwarding to Dr Efrain Cogan for review and recommendations

## 2021-06-09 NOTE — TELEPHONE ENCOUNTER
Reason for Disposition   [1] SEVERE pain with urination  (e g , excruciating) AND [2] not improved after 2 hours of pain medicine (e g , acetaminophen or ibuprofen)    Answer Assessment - Initial Assessment Questions  1  SEVERITY: "How bad is the pain?"  (e g , Scale 1-10; mild, moderate, or severe)    - MILD (1-3): complains slightly about urination hurting    - MODERATE (4-7): interferes with normal activities      - SEVERE (8-10): excruciating, unwilling or unable to urinate because of the pain       Severe   2  FREQUENCY: "How many times have you had painful urination today?"       Yes having to urinate every hour or less   3  PATTERN: "Is pain present every time you urinate or just sometimes?"       Severe   4  ONSET: "When did the painful urination start?"       Started last night   5  FEVER: "Do you have a fever?" If so, ask: "What is your temperature, how was it measured, and when did it start?"     Denies   6  PAST UTI: "Have you had a urine infection before?" If so, ask: "When was the last time?" and "What happened that time?"       Yes, tx for one last week   7  CAUSE: "What do you think is causing the painful urination?"       Freq   UTI   8  OTHER SYMPTOMS: "Do you have any other symptoms?" (e g , flank pain, penile discharge, scrotal pain, blood in urine)    Protocols used: [facility]

## 2021-06-09 NOTE — TELEPHONE ENCOUNTER
I ordered Bactrim DS 1 p o  B i d  For 7 days    Patient should come in for PVR if he is voiding every 15 minutes to make sure that he is emptying adequately

## 2021-06-09 NOTE — PROGRESS NOTES
HISTORY:    Ongoing urgency frequency suprapubic pressure, feels like he has to strain to get the urine out  Had a Linares in place all two weeks ago  PVR only 180  mL today  ASSESSMENT / PLAN:    Cysto shows jackstone bladder calculus, 3 cm diameter  Moderate BPH, PVR about 200 mL     He has not truly in retention, I think he is having tremendous irritative symptoms and urgency from the stone  Recommend cysto, laser bladder stone, we will schedule  The following portions of the patient's history were reviewed and updated as appropriate: allergies, current medications, past family history, past medical history, past social history, past surgical history and problem list     Review of Systems   All other systems reviewed and are negative  Objective:     Cystoscopy     Date/Time 6/9/2021 1:26 PM     Performed by  Atiya Owens MD     Authorized by Atiya Owens MD          Procedure Details:  Procedure type: cystoscopy    Patient tolerance: Patient tolerated the procedure well with no immediate complications    Additional Procedure Details:      Patient presents for cystoscopy  I have discussed the reasons for doing the test, and the potential risks and complications  Patient expressed understanding, and signed informed consent document  The patient was carefully  positioned supine on the examining table  Sterile preparation was performed on the urethra  Xylocaine jelly was instilled and left  Indwelling for the procedure  The 13 Slovak flexible cystoscope was passed with the following findings:      Urethra:  No strictures    Prostate:  lateral lobes moderate enlargement                   Bladder:  3 cm jackstone calculus left lateral wall  I can not be sure if the stone is stuck to the wall or not    Cannot see any soft tissue mass such as tumor in the stone, but that is sometimes a possibility     Residual urine:  200    Patient tolerated the procedure well and was escorted from the examining table  Physical Exam  Constitutional:       General: He is not in acute distress  Appearance: He is well-developed  He is not diaphoretic  HENT:      Head: Normocephalic and atraumatic  Eyes:      General: No scleral icterus  Pulmonary:      Effort: Pulmonary effort is normal    Skin:     Coloration: Skin is not pale  Neurological:      Mental Status: He is alert and oriented to person, place, and time  Psychiatric:         Behavior: Behavior normal          Thought Content: Thought content normal          Judgment: Judgment normal              0   Lab Value Date/Time    PSA 4 2 (H) 05/10/2019 1046    PSA 3 4 07/26/2017 1706   ]  BUN   Date Value Ref Range Status   01/30/2021 16 5 - 25 mg/dL Final   07/24/2020 16 7 - 25 mg/dL Final     Creatinine   Date Value Ref Range Status   01/30/2021 0 90 0 60 - 1 30 mg/dL Final     Comment:     Standardized to IDMS reference method     No components found for: CBC      Patient Active Problem List   Diagnosis    [facility]ignant [facility]    Abnormal glucose level    Acute pain of right knee    Adenocarcinoma of prostate [facility]    Arthropathy    Cardiomyopathy, ischemic    Chest pain    Chronic bilateral low back pain without sciatica    Essential hypertension    Gout    Hx pulmonary embolism    Hyperlipidemia    Status post percutaneous transluminal coronary angioplasty    Primary osteoarthritis of both hands    Pulmonary embolism with infarction [facility] [address] [facility]    [facility]    STEMI [facility] elevation [facility]) [facility]    Toxic myopathy    Coronary artery disease involving native coronary artery of native heart without angina pectoris    Other male erectile dysfunction    BPH with obstruction/lower urinary tract symptoms    Status post primary angioplasty with coronary stent    Urinary tract infection with hematuria        There are no diagnoses linked to this encounter         Patient ID: Bhupendra Le is a 74 y o  male        Current Outpatient Medications:     Alirocumab (Praluent) 150 MG/ML SOAJ, Inject 150 mg under the skin every 14 (fourteen) days, Disp: 2 pen, Rfl: 11    clopidogrel (PLAVIX) 75 mg tablet, Take 1 tablet (75 mg total) by mouth daily, Disp: 90 tablet, Rfl: 1    lisinopril (ZESTRIL) 5 mg tablet, Take 2 5 mg by mouth daily , Disp: , Rfl:     loratadine (CLARITIN) 10 mg tablet, Take 10 mg by mouth daily, Disp: , Rfl:     metoprolol succinate (TOPROL-XL) 25 mg 24 hr tablet, Take 25 mg by mouth , Disp: , Rfl:     sulfamethoxazole-trimethoprim (BACTRIM DS) 800-160 mg per tablet, Take 1 tablet by mouth every 12 (twelve) hours for 7 days, Disp: 14 tablet, Rfl: 0    tadalafil (CIALIS) 20 MG tablet, Take 1 tablet (20 mg total) by mouth daily as needed for erectile dysfunction, Disp: 10 tablet, Rfl: 3    tamsulosin (FLOMAX) 0 4 mg, Take 1 capsule (0 4 mg total) by mouth 2 (two) times a day, Disp: 90 capsule, Rfl: 3    traMADol (ULTRAM) 50 mg tablet, Take 2 tablets (100 mg total) by mouth as needed for moderate pain, Disp: 30 tablet, Rfl: 1    Vascepa 1 g CAPS, TAKE 2 CAPSULES TWICE DAILY, Disp: 360 capsule, Rfl: 3    Alirocumab (Praluent) 75 MG/ML SOAJ, Inject 1 mL (140 mg total) under the skin every 14 (fourteen) days (Patient not taking: Reported on 5/26/2021), Disp: 15 mL, Rfl: 3    [employer] ASPIRIN ADULT LOW DOSE 81 MG chewable tablet, daily, Disp: , Rfl:     Past Medical History:   Diagnosis Date    Acute ST elevation myocardial infarction (STEMI) involving left anterior descending (LAD) coronary artery ([facility] [facility] 9/8/2016    BPH with urinary obstruction     Elevated PSA     Erectile dysfunction     Gout     History of colonoscopy 01/19/2018    Hypercholesteremia     Hypertension     Malignant neoplasm prostate [facility] [address] [facility]     Pulmonary embolism ([facility] [facility]     Right inguinal hernia        Past Surgical History:   Procedure Laterality Date    PROSTATE BIOPSY Bilateral 2011    TONSILLECTOMY         Social History

## 2021-06-09 NOTE — TELEPHONE ENCOUNTER
Spoke to pt and advised Bactrim RX was ordered for UTI symptoms  He is scheduled for OV with nurse for PVR this morning per Dr Roque Allen

## 2021-06-09 NOTE — PRE-PROCEDURE INSTRUCTIONS
Pre-Surgery Instructions:   Medication Instructions    Alirocumab (Praluent) 150 MG/ML SOAJ Instructed patient per Anesthesia Guidelines   clopidogrel (PLAVIX) 75 mg tablet Patient was instructed by Physician and understands   lisinopril (ZESTRIL) 5 mg tablet Instructed patient per Anesthesia Guidelines   loratadine (CLARITIN) 10 mg tablet Instructed patient per Anesthesia Guidelines   metoprolol succinate (TOPROL-XL) 25 mg 24 hr tablet Instructed patient per Anesthesia Guidelines   sulfamethoxazole-trimethoprim (BACTRIM DS) 800-160 mg per tablet Instructed patient per Anesthesia Guidelines   tadalafil (CIALIS) 20 MG tablet Instructed patient per Anesthesia Guidelines   tamsulosin (FLOMAX) 0 4 mg Instructed patient per Anesthesia Guidelines   traMADol (ULTRAM) 50 mg tablet Instructed patient per Anesthesia Guidelines   Vascepa 1 g CAPS Instructed patient per Anesthesia Guidelines  Instructed to take tramadol am of surgery if needed with sip of water per anesthesia guidelines

## 2021-06-10 PROCEDURE — NC001 PR NO CHARGE: Performed by: UROLOGY

## 2021-06-10 NOTE — H&P
HISTORY AND PHYSICAL  ? ? Patient Name: Sola Sanon  Patient MRN: [id number]  Attending Provider: Adrain Phalen, MD  Service: Urology  Chief Complaint    Bladder stone    HPI   Sola Sanon is a 74 y o  male with recent irritative and obstructive voiding symptoms  Cysto shows 2-3 cm bladder stone possibly adherent to the right posterior wall the bladder  I plan cystoscopy, laser bladder stone, and any other associated procedures that could be  required at the time, and informed consent was given by the patient  Medications  Meds/Allergies   No current facility-administered medications for this encounter  Cannot display prior to admission medications because the patient has not been admitted in this contact  No current facility-administered medications for this encounter       Current Outpatient Medications:     Alirocumab (Praluent) 150 MG/ML SOAJ, Inject 150 mg under the skin every 14 (fourteen) days, Disp: 2 pen, Rfl: 11    clopidogrel (PLAVIX) 75 mg tablet, Take 1 tablet (75 mg total) by mouth daily (Patient taking differently: Take 75 mg by mouth daily Last dose 6/11), Disp: 90 tablet, Rfl: 1    lisinopril (ZESTRIL) 5 mg tablet, Take 2 5 mg by mouth daily , Disp: , Rfl:     loratadine (CLARITIN) 10 mg tablet, Take 10 mg by mouth daily, Disp: , Rfl:     metoprolol succinate (TOPROL-XL) 25 mg 24 hr tablet, Take 25 mg by mouth every evening , Disp: , Rfl:     sulfamethoxazole-trimethoprim (BACTRIM DS) 800-160 mg per tablet, Take 1 tablet by mouth every 12 (twelve) hours for 7 days, Disp: 14 tablet, Rfl: 0    tadalafil (CIALIS) 20 MG tablet, Take 1 tablet (20 mg total) by mouth daily as needed for erectile dysfunction, Disp: 10 tablet, Rfl: 3    tamsulosin (FLOMAX) 0 4 mg, Take 1 capsule (0 4 mg total) by mouth 2 (two) times a day (Patient taking differently: Take 0 8 mg by mouth daily with dinner ), Disp: 90 capsule, Rfl: 3    traMADol (ULTRAM) 50 mg tablet, Take 2 tablets (100 mg total) by mouth as needed for moderate pain, Disp: 30 tablet, Rfl: 1    Vascepa 1 g CAPS, TAKE 2 CAPSULES TWICE DAILY, Disp: 360 capsule, Rfl: 3  Review of Systems  10 point review of systems negative except as noted in HPI  Allergies  Allergies   Allergen Reactions    No Active Allergies      PMH  Past Medical History:   Diagnosis Date    Acute ST elevation myocardial infarction (STEMI) involving left anterior descending (LAD) coronary artery (HCC) 2016    Arthritis     low back and fingers    BCC (basal cell carcinoma of skin)     left shoulder and nose in past    Bladder stone     BPH with urinary obstruction     Coronary artery disease      2 stents    Elevated PSA     Erectile dysfunction     Gout     History of colonoscopy 2018    History of DVT in adulthood     RLE    Hypercholesteremia     Hypertension     [facility]ignant [facility] prostate [facility] [address] [facility]     Prediabetes     Pulmonary embolism ([facility] [facility]     Right inguinal hernia     Sclerosing mesenteritis ([facility] [facility]     Seasonal allergies     Wears glasses      Past surgical history  Past Surgical History:   Procedure Laterality Date    COLONOSCOPY      CORONARY STENT PLACEMENT      PROSTATE BIOPSY Bilateral 2011    TONSILLECTOMY      UMBILICAL HERNIA REPAIR       Social history  Social History     Tobacco Use    Smoking status: Former Smoker     Types: Cigarettes     Quit date:      Years since quittin 4    Smokeless tobacco: Never Used   Substance Use Topics    Alcohol use:  Yes     Alcohol/week: 1 0 standard drinks     Types: 1 Cans of beer per week     Frequency: Monthly or less     Drinks per session: 1 or 2     Binge frequency: Never     Comment: beer    Drug use: No     ?  Physical Exam     vs  General appearance: alert and oriented, in no acute distress  Head: Normocephalic, without obvious abnormality, atraumatic  Neck: no adenopathy, no carotid bruit, no JVD, supple, symmetrical, trachea midline and thyroid not enlarged, symmetric, no tenderness/mass/nodules  Back: symmetric, no curvature  ROM normal  No CVA tenderness    Lungs: clear to auscultation bilaterally  Heart: regular rate and rhythm, S1, S2 normal, no murmur, click, rub or gallop  Abdomen: soft, non-tender; bowel sounds normal; no masses,  no organomegaly  Extremities: extremities normal, warm and well-perfused; no cyanosis, clubbing, or edema  Neurologic: Grossly normal  Maisha Iraheta MD

## 2021-06-13 ENCOUNTER — ANESTHESIA EVENT (OUTPATIENT)
Dept: PERIOP | Facility: HOSPITAL | Age: 66
End: 2021-06-13
Payer: MEDICARE

## 2021-06-14 ENCOUNTER — APPOINTMENT (OUTPATIENT)
Dept: LAB | Facility: MEDICAL CENTER | Age: 66
End: 2021-06-14
Attending: UROLOGY
Payer: MEDICARE

## 2021-06-14 DIAGNOSIS — N40.1 BPH WITH OBSTRUCTION/LOWER URINARY TRACT SYMPTOMS: ICD-10-CM

## 2021-06-14 DIAGNOSIS — N13.8 BPH WITH OBSTRUCTION/LOWER URINARY TRACT SYMPTOMS: ICD-10-CM

## 2021-06-14 DIAGNOSIS — N30.00 ACUTE CYSTITIS WITHOUT HEMATURIA: ICD-10-CM

## 2021-06-14 LAB
ANION GAP SERPL CALCULATED.3IONS-SCNC: 5 MMOL/L (ref 4–13)
BUN SERPL-MCNC: 14 MG/DL (ref 5–25)
CALCIUM SERPL-MCNC: 9.5 MG/DL (ref 8.3–10.1)
CHLORIDE SERPL-SCNC: 107 MMOL/L (ref 100–108)
CO2 SERPL-SCNC: 26 MMOL/L (ref 21–32)
CREAT SERPL-MCNC: 1.09 MG/DL (ref 0.6–1.3)
GFR SERPL CREATININE-BSD FRML MDRD: 69 ML/MIN/1.73SQ M
GLUCOSE P FAST SERPL-MCNC: 102 MG/DL (ref 65–99)
POTASSIUM SERPL-SCNC: 4.4 MMOL/L (ref 3.5–5.3)
SODIUM SERPL-SCNC: 138 MMOL/L (ref 136–145)

## 2021-06-14 PROCEDURE — 80048 BASIC METABOLIC PNL TOTAL CA: CPT

## 2021-06-14 PROCEDURE — 36415 COLL VENOUS BLD VENIPUNCTURE: CPT

## 2021-06-15 ENCOUNTER — HOSPITAL ENCOUNTER (OUTPATIENT)
Facility: HOSPITAL | Age: 66
Setting detail: OUTPATIENT SURGERY
Discharge: HOME | End: 2021-06-15
Attending: UROLOGY | Admitting: UROLOGY
Payer: MEDICARE

## 2021-06-15 ENCOUNTER — ANESTHESIA (OUTPATIENT)
Dept: PERIOP | Facility: HOSPITAL | Age: 66
End: 2021-06-15
Payer: MEDICARE

## 2021-06-15 VITALS
SYSTOLIC BLOOD PRESSURE: 128 MMHG | WEIGHT: 228 LBS | OXYGEN SATURATION: 97 % | TEMPERATURE: 98 F | HEART RATE: 83 BPM | RESPIRATION RATE: 16 BRPM | HEIGHT: 73 IN | DIASTOLIC BLOOD PRESSURE: 71 MMHG | BODY MASS INDEX: 30.22 KG/M2

## 2021-06-15 DIAGNOSIS — N21.0 BLADDER STONE: Primary | ICD-10-CM

## 2021-06-15 PROCEDURE — 99024 POSTOP FOLLOW-UP VISIT: CPT | Performed by: UROLOGY

## 2021-06-15 PROCEDURE — 52318 REMOVE BLADDER STONE: CPT | Performed by: UROLOGY

## 2021-06-15 RX ORDER — SODIUM CHLORIDE, SODIUM LACTATE, POTASSIUM CHLORIDE, CALCIUM CHLORIDE 600; 310; 30; 20 MG/100ML; MG/100ML; MG/100ML; MG/100ML
INJECTION, SOLUTION INTRAVENOUS CONTINUOUS PRN
Status: DISCONTINUED | OUTPATIENT
Start: 2021-06-15 | End: 2021-06-15

## 2021-06-15 RX ORDER — EPHEDRINE SULFATE 50 MG/ML
INJECTION INTRAVENOUS AS NEEDED
Status: DISCONTINUED | OUTPATIENT
Start: 2021-06-15 | End: 2021-06-15

## 2021-06-15 RX ORDER — CEFAZOLIN SODIUM 2 G/50ML
2000 SOLUTION INTRAVENOUS ONCE
Status: DISCONTINUED | OUTPATIENT
Start: 2021-06-15 | End: 2021-06-15 | Stop reason: SDUPTHER

## 2021-06-15 RX ORDER — DEXAMETHASONE SODIUM PHOSPHATE 4 MG/ML
INJECTION, SOLUTION INTRA-ARTICULAR; INTRALESIONAL; INTRAMUSCULAR; INTRAVENOUS; SOFT TISSUE AS NEEDED
Status: DISCONTINUED | OUTPATIENT
Start: 2021-06-15 | End: 2021-06-15

## 2021-06-15 RX ORDER — HYDROMORPHONE HCL/PF 1 MG/ML
SYRINGE (ML) INJECTION AS NEEDED
Status: DISCONTINUED | OUTPATIENT
Start: 2021-06-15 | End: 2021-06-15

## 2021-06-15 RX ORDER — ONDANSETRON 2 MG/ML
4 INJECTION INTRAMUSCULAR; INTRAVENOUS ONCE AS NEEDED
Status: DISCONTINUED | OUTPATIENT
Start: 2021-06-15 | End: 2021-06-15 | Stop reason: HOSPADM

## 2021-06-15 RX ORDER — OXYBUTYNIN CHLORIDE 5 MG/1
5 TABLET ORAL ONCE
Status: COMPLETED | OUTPATIENT
Start: 2021-06-15 | End: 2021-06-15

## 2021-06-15 RX ORDER — FENTANYL CITRATE 50 UG/ML
INJECTION, SOLUTION INTRAMUSCULAR; INTRAVENOUS AS NEEDED
Status: DISCONTINUED | OUTPATIENT
Start: 2021-06-15 | End: 2021-06-15

## 2021-06-15 RX ORDER — PROPOFOL 10 MG/ML
INJECTION, EMULSION INTRAVENOUS AS NEEDED
Status: DISCONTINUED | OUTPATIENT
Start: 2021-06-15 | End: 2021-06-15

## 2021-06-15 RX ORDER — LIDOCAINE HYDROCHLORIDE 20 MG/ML
INJECTION, SOLUTION EPIDURAL; INFILTRATION; INTRACAUDAL; PERINEURAL AS NEEDED
Status: DISCONTINUED | OUTPATIENT
Start: 2021-06-15 | End: 2021-06-15

## 2021-06-15 RX ORDER — MIDAZOLAM HYDROCHLORIDE 2 MG/2ML
INJECTION, SOLUTION INTRAMUSCULAR; INTRAVENOUS AS NEEDED
Status: DISCONTINUED | OUTPATIENT
Start: 2021-06-15 | End: 2021-06-15

## 2021-06-15 RX ORDER — FENTANYL CITRATE/PF 50 MCG/ML
25 SYRINGE (ML) INJECTION
Status: DISCONTINUED | OUTPATIENT
Start: 2021-06-15 | End: 2021-06-15 | Stop reason: HOSPADM

## 2021-06-15 RX ORDER — MAGNESIUM HYDROXIDE 1200 MG/15ML
LIQUID ORAL AS NEEDED
Status: DISCONTINUED | OUTPATIENT
Start: 2021-06-15 | End: 2021-06-15 | Stop reason: HOSPADM

## 2021-06-15 RX ORDER — OXYCODONE HYDROCHLORIDE AND ACETAMINOPHEN 5; 325 MG/1; MG/1
1 TABLET ORAL EVERY 4 HOURS PRN
Status: DISCONTINUED | OUTPATIENT
Start: 2021-06-15 | End: 2021-06-15 | Stop reason: HOSPADM

## 2021-06-15 RX ORDER — ONDANSETRON 2 MG/ML
INJECTION INTRAMUSCULAR; INTRAVENOUS AS NEEDED
Status: DISCONTINUED | OUTPATIENT
Start: 2021-06-15 | End: 2021-06-15

## 2021-06-15 RX ORDER — CEFAZOLIN SODIUM 2 G/50ML
SOLUTION INTRAVENOUS AS NEEDED
Status: DISCONTINUED | OUTPATIENT
Start: 2021-06-15 | End: 2021-06-15

## 2021-06-15 RX ORDER — OXYCODONE HYDROCHLORIDE AND ACETAMINOPHEN 5; 325 MG/1; MG/1
2 TABLET ORAL EVERY 4 HOURS PRN
Status: DISCONTINUED | OUTPATIENT
Start: 2021-06-15 | End: 2021-06-15 | Stop reason: HOSPADM

## 2021-06-15 RX ORDER — SODIUM CHLORIDE 9 MG/ML
125 INJECTION, SOLUTION INTRAVENOUS CONTINUOUS
Status: DISCONTINUED | OUTPATIENT
Start: 2021-06-15 | End: 2021-06-15 | Stop reason: HOSPADM

## 2021-06-15 RX ORDER — HYDROCODONE BITARTRATE AND ACETAMINOPHEN 5; 325 MG/1; MG/1
TABLET ORAL
Qty: 10 TABLET | Refills: 0 | Status: SHIPPED | OUTPATIENT
Start: 2021-06-15 | End: 2021-08-12

## 2021-06-15 RX ADMIN — SODIUM CHLORIDE 125 ML/HR: 0.9 INJECTION, SOLUTION INTRAVENOUS at 11:40

## 2021-06-15 RX ADMIN — HYDROMORPHONE HYDROCHLORIDE 0.5 MG: 1 INJECTION, SOLUTION INTRAMUSCULAR; INTRAVENOUS; SUBCUTANEOUS at 13:12

## 2021-06-15 RX ADMIN — OXYCODONE HYDROCHLORIDE AND ACETAMINOPHEN 1 TABLET: 5; 325 TABLET ORAL at 15:32

## 2021-06-15 RX ADMIN — FENTANYL CITRATE 25 MCG: 50 INJECTION INTRAMUSCULAR; INTRAVENOUS at 14:33

## 2021-06-15 RX ADMIN — EPHEDRINE SULFATE 10 MG: 50 INJECTION, SOLUTION INTRAVENOUS at 13:21

## 2021-06-15 RX ADMIN — MIDAZOLAM 2 MG: 1 INJECTION INTRAMUSCULAR; INTRAVENOUS at 12:18

## 2021-06-15 RX ADMIN — SODIUM CHLORIDE, SODIUM LACTATE, POTASSIUM CHLORIDE, AND CALCIUM CHLORIDE: .6; .31; .03; .02 INJECTION, SOLUTION INTRAVENOUS at 12:49

## 2021-06-15 RX ADMIN — PROPOFOL 200 MG: 10 INJECTION, EMULSION INTRAVENOUS at 12:31

## 2021-06-15 RX ADMIN — CEFAZOLIN SODIUM 2000 MG: 2 SOLUTION INTRAVENOUS at 12:10

## 2021-06-15 RX ADMIN — LIDOCAINE HYDROCHLORIDE 100 MG: 20 INJECTION, SOLUTION EPIDURAL; INFILTRATION; INTRACAUDAL; PERINEURAL at 12:31

## 2021-06-15 RX ADMIN — FENTANYL CITRATE 25 MCG: 50 INJECTION INTRAMUSCULAR; INTRAVENOUS at 14:48

## 2021-06-15 RX ADMIN — FENTANYL CITRATE 50 MCG: 50 INJECTION INTRAMUSCULAR; INTRAVENOUS at 12:40

## 2021-06-15 RX ADMIN — DEXAMETHASONE SODIUM PHOSPHATE 4 MG: 4 INJECTION INTRA-ARTICULAR; INTRALESIONAL; INTRAMUSCULAR; INTRAVENOUS; SOFT TISSUE at 12:40

## 2021-06-15 RX ADMIN — FENTANYL CITRATE 25 MCG: 50 INJECTION INTRAMUSCULAR; INTRAVENOUS at 14:38

## 2021-06-15 RX ADMIN — OXYBUTYNIN CHLORIDE 5 MG: 5 TABLET ORAL at 16:14

## 2021-06-15 RX ADMIN — ONDANSETRON 4 MG: 2 INJECTION INTRAMUSCULAR; INTRAVENOUS at 12:40

## 2021-06-15 RX ADMIN — FENTANYL CITRATE 50 MCG: 50 INJECTION INTRAMUSCULAR; INTRAVENOUS at 12:42

## 2021-06-15 NOTE — INTERVAL H&P NOTE
H&P reviewed  After examining the patient I find no changes in the patients condition since the H&P had been written      Vitals:    06/15/21 1127   BP: 116/74   Pulse: 82   Resp: 14   Temp: 98 4 °F (36 9 °C)   SpO2: 98%

## 2021-06-15 NOTE — DISCHARGE INSTRUCTIONS
ALL YOUR  PREVIOUS MEDS ARE THE SAME  NEW MEDS:    Hydrocodone for pain     You can get in the shower with the catheter    EXPECT SOME BLOOD IN URINE, BURNING, FREQUENT URINATION  ACTIVITY:  RESUME FULL ACTIVITY the day after the catheter is out  Linares Catheter Removal   WHAT YOU NEED TO KNOW:   Your Linares catheter was removed because you no longer need it  You may have certain urinary symptoms for up to 48 hours after your Linares catheter is removed  These include urinary urgency and frequency  Urinary urgency means you feel such a strong need to urinate that you have trouble waiting  You may also feel discomfort in your bladder  Urinary frequency means you need to urinate many times during the day  You may also have pain while urinating, or your bladder may not empty completely when you urinate  DISCHARGE INSTRUCTIONS:   Call your doctor or urologist if:   · You do not urinate at all within 8 hours of your catheter removal     · You have a fever  · You are leaking urine  · You have urinary urgency, frequency, or trouble urinating for more than 48 hours after catheter removal     · You have pain while you urinate, or you feel like your bladder is not emptying completely for more than 48 hours after catheter removal     · You see blood in your urine  · Your abdomen is bloated  · You have questions or concerns about your condition or care  Medicines:   · Antibiotics  may be given if you had surgery on your urinary tract  · Take your medicine as directed  Contact your healthcare provider if you think your medicine is not helping or if you have side effects  Tell him or her if you are allergic to any medicine  Keep a list of the medicines, vitamins, and herbs you take  Include the amounts, and when and why you take them  Bring the list or the pill bottles to follow-up visits  Carry your medicine list with you in case of an emergency  Drink liquids as directed:   You may be asked to drink plenty of liquids after the removal of your catheter  This will help to flush out bacteria that can build up while using a Linares catheter  Ask your healthcare provider how much you should drink and which liquids are best for you  Follow up with your doctor or urologist as directed:  Write down your questions so you remember to ask them during your visits  © Copyright [address] Information is for End User's use only and may not be sold, redistributed or otherwise used for commercial purposes  All illustrations and images included in CareNotes® are the copyrighted property of [employer]  or [facility] Nathaniel Luna   The above information is an  only  It is not intended as medical advice for individual conditions or treatments  Talk to your doctor, nurse or pharmacist before following any medical regimen to see if it is safe and effective for you

## 2021-06-15 NOTE — DISCHARGE SUMMARY
Discharge Summary - Miguel Ángel Leger 74 y o  male MRN: [id number]    Admission Date: 6/15/2021     Admitting Diagnosis: Bladder stone [N21 0]    Procedures Performed:  Cysto, laser bladder stone    Patient underwent planned outpt surgery and recovered without complication  Discharged in good condition  Medications were prescribed  Pt knows to have office follow-up at the appropriate time

## 2021-06-15 NOTE — ANESTHESIA POSTPROCEDURE EVALUATION
Post-Op Assessment Note    CV Status:  Stable  Pain Score: 2    Pain management: adequate     Mental Status:  Alert and awake   Hydration Status:  Euvolemic and stable   PONV Controlled:  Controlled   Airway Patency:  Patent      Post Op Vitals Reviewed: Yes      Staff: Anesthesiologist         No complications documented      BP      Temp     Pulse     Resp      SpO2

## 2021-06-15 NOTE — OP NOTE
OPERATIVE REPORT  PATIENT NAME: Satinder Kohler    :  1955  MRN: [id number]  Pt Location: [facility] OR ROOM 03    SURGERY DATE: 6/15/2021    Surgeon(s) and Role:     * Maximus Gaspar MD - Primary    Preop Diagnosis:  Bladder stone [N21 0]    Post-Op Diagnosis Codes: * Bladder stone [N21 0]    Procedure(s) (LRB):  LITHOLOPAXY HOLMIUM LASER BLADDER STONE (N/A)  CYSTOSCOPY (N/A)    Specimen(s):  * No specimens in log *    Estimated Blood Loss:   Minimal    Drains:  Urethral Catheter Latex 22 Fr  (Active)   Reasons to continue Urinary Catheter  Post-operative urological requirements 06/15/21 1520   Site Assessment Clean;Skin intact 06/15/21 1520   Collection Container Standard drainage bag 06/15/21 1520   Securement Method Other (Comment) 06/15/21 1520   Output (mL) 150 mL 06/15/21 1439   Number of days: 0       Anesthesia Type:   General/LMA    Operative Indications:  Bladder stone [N21 0]  Enlarged prostate with poor bladder emptying    Operative Findings:  3-4 cm bladder stone, extremely hard  I was able to break up about 90% of it, by that time there was from bleeding from stone particles bruising the bladder and prostate, so I stopped  There might be 10% of stone left to be lasered    Complications:   None    Procedure and Technique:  After the patient was placed under adequate general anesthesia, he was prepped and draped using chlorhexidine solution in lithotomy position  The 25 [place]uan scope was passed without difficulty in the urethra which had no stenosis  Prostate was quite enlarged, significant obstruction  The bladder had retain urine about 150 mL, severe trabeculation, and this large mobile spiculated stone  The laser fiber was used, and I  lasered the stone on standard settings of about 15 w  I had to go up to 28 w power to get the stone to break up  Over the next 90 minutes I broke up as much stone as I could, taking care not to laser the bladder surface or prostate    At this point there was significant bleeding from stone particles bruising the bladder and prostate, I could not see well  Therefore decided to stop at this point    I irrigated out lots of stone, there is probably 10% or so of the initial stone burden left which might need a laser at a later time    I careful inspection, there is no significant bleeders required cautery, just a diffuse type of  hemorrhagic effect  The scope was removed catheter was passed, irrigated to clear  Patient taken to recovery good condition     I was present for the entire procedure    Patient Disposition:  PACU     SIGNATURE: Maximus Gaspar MD  DATE: Kathy 15, 2021  TIME: 3:41 PM

## 2021-06-15 NOTE — ANESTHESIA PREPROCEDURE EVALUATION
Procedure:  LITHOLOPAXY HOLMIUM LASER BLADDER STONE (N/A Bladder)  CYSTOSCOPY (N/A Bladder)    Relevant Problems   CARDIO   (+) Chest pain   (+) Coronary artery disease involving native coronary artery of native heart without angina pectoris   (+) Essential hypertension   (+) Hyperlipidemia   (+) STEMI (ST elevation myocardial infarction) (HCC)      /RENAL   (+) Adenocarcinoma of prostate (HCC)   (+) BPH with urinary obstruction   (+) Malignant neoplasm of prostate (HCC)      MUSCULOSKELETAL   (+) Chronic bilateral low back pain without sciatica   (+) Gout   (+) Primary osteoarthritis of both hands   (+) Toxic myopathy      NEURO/PSYCH   (+) Hx pulmonary embolism        Physical Exam    Airway    Mallampati score: II  TM Distance: >3 FB  Neck ROM: full     Dental       Cardiovascular  Rhythm: regular, Rate: normal, Cardiovascular exam normal    Pulmonary  Pulmonary exam normal Breath sounds clear to auscultation,     Other Findings        Anesthesia Plan  ASA Score- 3     Anesthesia Type- general with ASA Monitors  Additional Monitors:   Airway Plan: LMA  Plan Factors-Exercise tolerance (METS): >4 METS  Chart reviewed  Existing labs reviewed  Patient is not a current smoker  Obstructive sleep apnea risk education given perioperatively  Induction- intravenous  Postoperative Plan-     Informed Consent- Anesthetic plan and risks discussed with patient

## 2021-06-17 ENCOUNTER — TELEPHONE (OUTPATIENT)
Dept: UROLOGY | Facility: MEDICAL CENTER | Age: 66
End: 2021-06-17

## 2021-06-17 ENCOUNTER — PROCEDURE VISIT (OUTPATIENT)
Dept: UROLOGY | Facility: MEDICAL CENTER | Age: 66
End: 2021-06-17
Payer: MEDICARE

## 2021-06-17 VITALS
BODY MASS INDEX: 29.42 KG/M2 | HEIGHT: 73 IN | WEIGHT: 222 LBS | SYSTOLIC BLOOD PRESSURE: 128 MMHG | DIASTOLIC BLOOD PRESSURE: 62 MMHG

## 2021-06-17 DIAGNOSIS — N40.1 BPH WITH OBSTRUCTION/LOWER URINARY TRACT SYMPTOMS: Primary | ICD-10-CM

## 2021-06-17 DIAGNOSIS — N21.0 BLADDER STONE: ICD-10-CM

## 2021-06-17 DIAGNOSIS — N13.8 BPH WITH OBSTRUCTION/LOWER URINARY TRACT SYMPTOMS: Primary | ICD-10-CM

## 2021-06-17 PROCEDURE — 99211 OFF/OP EST MAY X REQ PHY/QHP: CPT

## 2021-06-17 RX ORDER — OXYBUTYNIN CHLORIDE 5 MG/1
TABLET ORAL
COMMUNITY
Start: 2021-06-15 | End: 2021-08-12

## 2021-06-17 NOTE — TELEPHONE ENCOUNTER
Pt post op s/p LITHOLOPAXY HOLMIUM LASER BLADDER STONE (N/A Bladder)       CYSTOSCOPY (N/A Bladder) on 6-  Pt was seen today for nuno catheter removal  He is asking when he can restart his Plavix  His urine was clear yellow urine today at the time of nuno catheter removal      Please advise

## 2021-06-17 NOTE — PROGRESS NOTES
6/17/2021    Satinder Kohler  1955  [id number]    Diagnosis  Chief Complaint     Urinary Retention; Prostate Cancer; bladder stones          Patient presents for nuno catheter removal s/p LITHOLOPAXY HOLMIUM LASER BLADDER STONE (N/A Bladder)       CYSTOSCOPY (N/A Bladder) managed by Dr Jabier Foley  Return to the office in 4-6 weeks for follow up s/p surgery  Procedure Nuno removal    Nuno catheter removed after deflation of an intact balloon  Patient tolerated well  Encouraged patient to hydrate well  Advised patient that if he is unable to urinate within the next 4-6 hours, he will need to proceed to the nearest ER for evaluation  Patient agrees to this plan             Vitals:    06/17/21 1031   BP: 128/62   BP Location: Left arm   Patient Position: Sitting   Cuff Size: Adult   Weight: 101 kg (222 lb)   Height: 6' 1" (1 854 m)           Kalpana Bundy RN

## 2021-06-24 ENCOUNTER — PROCEDURE VISIT (OUTPATIENT)
Dept: UROLOGY | Facility: CLINIC | Age: 66
End: 2021-06-24
Payer: MEDICARE

## 2021-06-24 ENCOUNTER — APPOINTMENT (OUTPATIENT)
Dept: LAB | Facility: MEDICAL CENTER | Age: 66
End: 2021-06-24
Payer: MEDICARE

## 2021-06-24 VITALS
SYSTOLIC BLOOD PRESSURE: 110 MMHG | DIASTOLIC BLOOD PRESSURE: 70 MMHG | RESPIRATION RATE: 20 BRPM | WEIGHT: 221 LBS | BODY MASS INDEX: 29.29 KG/M2 | HEIGHT: 73 IN | HEART RATE: 72 BPM

## 2021-06-24 DIAGNOSIS — N13.8 BPH WITH OBSTRUCTION/LOWER URINARY TRACT SYMPTOMS: Primary | ICD-10-CM

## 2021-06-24 DIAGNOSIS — Z95.5 STATUS POST PRIMARY ANGIOPLASTY WITH CORONARY STENT: ICD-10-CM

## 2021-06-24 DIAGNOSIS — M54.50 CHRONIC BILATERAL LOW BACK PAIN WITHOUT SCIATICA: ICD-10-CM

## 2021-06-24 DIAGNOSIS — N40.1 BPH WITH OBSTRUCTION/LOWER URINARY TRACT SYMPTOMS: Primary | ICD-10-CM

## 2021-06-24 DIAGNOSIS — E78.2 MIXED HYPERLIPIDEMIA: ICD-10-CM

## 2021-06-24 DIAGNOSIS — R73.09 ABNORMAL GLUCOSE LEVEL: ICD-10-CM

## 2021-06-24 DIAGNOSIS — N30.00 ACUTE CYSTITIS WITHOUT HEMATURIA: ICD-10-CM

## 2021-06-24 DIAGNOSIS — G89.29 CHRONIC BILATERAL LOW BACK PAIN WITHOUT SCIATICA: ICD-10-CM

## 2021-06-24 DIAGNOSIS — Z86.711 HX PULMONARY EMBOLISM: ICD-10-CM

## 2021-06-24 LAB
ALBUMIN SERPL BCP-MCNC: 3.9 G/DL (ref 3.5–5)
ALP SERPL-CCNC: 54 U/L (ref 46–116)
ALT SERPL W P-5'-P-CCNC: 20 U/L (ref 12–78)
ANION GAP SERPL CALCULATED.3IONS-SCNC: 3 MMOL/L (ref 4–13)
AST SERPL W P-5'-P-CCNC: 11 U/L (ref 5–45)
BASOPHILS # BLD AUTO: 0.06 THOUSANDS/ΜL (ref 0–0.1)
BASOPHILS NFR BLD AUTO: 1 % (ref 0–1)
BILIRUB SERPL-MCNC: 0.5 MG/DL (ref 0.2–1)
BUN SERPL-MCNC: 14 MG/DL (ref 5–25)
CALCIUM SERPL-MCNC: 9.5 MG/DL (ref 8.3–10.1)
CHLORIDE SERPL-SCNC: 108 MMOL/L (ref 100–108)
CO2 SERPL-SCNC: 29 MMOL/L (ref 21–32)
CREAT SERPL-MCNC: 0.86 MG/DL (ref 0.6–1.3)
EOSINOPHIL # BLD AUTO: 0.12 THOUSAND/ΜL (ref 0–0.61)
EOSINOPHIL NFR BLD AUTO: 2 % (ref 0–6)
ERYTHROCYTE [DISTWIDTH] IN BLOOD BY AUTOMATED COUNT: 13.8 % (ref 11.6–15.1)
EST. AVERAGE GLUCOSE BLD GHB EST-MCNC: 126 MG/DL
GFR SERPL CREATININE-BSD FRML MDRD: 89 ML/MIN/1.73SQ M
GLUCOSE P FAST SERPL-MCNC: 110 MG/DL (ref 65–99)
HBA1C MFR BLD: 6 %
HCT VFR BLD AUTO: 41.1 % (ref 36.5–49.3)
HGB BLD-MCNC: 13 G/DL (ref 12–17)
IMM GRANULOCYTES # BLD AUTO: 0.02 THOUSAND/UL (ref 0–0.2)
IMM GRANULOCYTES NFR BLD AUTO: 0 % (ref 0–2)
LDLC SERPL DIRECT ASSAY-MCNC: 62 MG/DL (ref 0–100)
LYMPHOCYTES # BLD AUTO: 2.08 THOUSANDS/ΜL (ref 0.6–4.47)
LYMPHOCYTES NFR BLD AUTO: 28 % (ref 14–44)
MCH RBC QN AUTO: 30.2 PG (ref 26.8–34.3)
MCHC RBC AUTO-ENTMCNC: 31.6 G/DL (ref 31.4–37.4)
MCV RBC AUTO: 95 FL (ref 82–98)
MONOCYTES # BLD AUTO: 0.74 THOUSAND/ΜL (ref 0.17–1.22)
MONOCYTES NFR BLD AUTO: 10 % (ref 4–12)
NEUTROPHILS # BLD AUTO: 4.38 THOUSANDS/ΜL (ref 1.85–7.62)
NEUTS SEG NFR BLD AUTO: 59 % (ref 43–75)
NRBC BLD AUTO-RTO: 0 /100 WBCS
PLATELET # BLD AUTO: 344 THOUSANDS/UL (ref 149–390)
PMV BLD AUTO: 10.9 FL (ref 8.9–12.7)
POST-VOID RESIDUAL VOLUME, ML POC: 184 ML
POTASSIUM SERPL-SCNC: 4.5 MMOL/L (ref 3.5–5.3)
PROT SERPL-MCNC: 7.4 G/DL (ref 6.4–8.2)
RBC # BLD AUTO: 4.31 MILLION/UL (ref 3.88–5.62)
SL AMB  POCT GLUCOSE, UA: NORMAL
SL AMB LEUKOCYTE ESTERASE,UA: NORMAL
SL AMB POCT BILIRUBIN,UA: NORMAL
SL AMB POCT BLOOD,UA: NORMAL
SL AMB POCT CLARITY,UA: CLEAR
SL AMB POCT COLOR,UA: YELLOW
SL AMB POCT KETONES,UA: NORMAL
SL AMB POCT NITRITE,UA: NORMAL
SL AMB POCT PH,UA: 5
SL AMB POCT SPECIFIC GRAVITY,UA: 1.03
SL AMB POCT URINE PROTEIN: NORMAL
SL AMB POCT UROBILINOGEN: NORMAL
SODIUM SERPL-SCNC: 140 MMOL/L (ref 136–145)
TRIGL SERPL-MCNC: 127 MG/DL
WBC # BLD AUTO: 7.4 THOUSAND/UL (ref 4.31–10.16)

## 2021-06-24 PROCEDURE — 81002 URINALYSIS NONAUTO W/O SCOPE: CPT | Performed by: UROLOGY

## 2021-06-24 PROCEDURE — 83721 ASSAY OF BLOOD LIPOPROTEIN: CPT

## 2021-06-24 PROCEDURE — 84478 ASSAY OF TRIGLYCERIDES: CPT

## 2021-06-24 PROCEDURE — 80053 COMPREHEN METABOLIC PANEL: CPT

## 2021-06-24 PROCEDURE — 83036 HEMOGLOBIN GLYCOSYLATED A1C: CPT

## 2021-06-24 PROCEDURE — 36415 COLL VENOUS BLD VENIPUNCTURE: CPT

## 2021-06-24 PROCEDURE — 87086 URINE CULTURE/COLONY COUNT: CPT

## 2021-06-24 PROCEDURE — 51798 US URINE CAPACITY MEASURE: CPT | Performed by: UROLOGY

## 2021-06-24 PROCEDURE — 85025 COMPLETE CBC W/AUTO DIFF WBC: CPT

## 2021-06-24 NOTE — PROGRESS NOTES
6/24/2021  Naeem Arroyo is a 74 y o  male  [id number]    Diagnosis:  Chief Complaint     Benign Prostatic Hypertrophy; PVR          Patient presents for follow up post void residual s/p cysto, litholopaxy, holmium laser bladder stone  managed by Dr Lechuga Hand:  Patient to take Flomax one in the morning and one in the evening instead of 2 tablets at dinner   Patient to contact Dr Olivares Son office if symptoms do not improve after several days  Assessment:  Patient continues with severe lower tract symptoms  The need to strain for urination, bladder spasms causing urgency and frequency  Patient's  ml  Urinalysis negative  Discussed patient with Essie Shah  Will try changing Flomax to BID instead of 2 tablets at dinner  Continue Ditropan 5 mg prn  Discussed plan with patient and he voiced understanding of instructions  Vitals:    06/24/21 1031   BP: 110/70   Pulse: 72   Resp: 20   Weight: 100 kg (221 lb)   Height: 6' 1" (1 854 m)     Recent Results (from the past 1 hour(s))   POCT Measure PVR    Collection Time: 06/24/21 10:40 AM   Result Value Ref Range    POST-VOID RESIDUAL VOLUME, ML  mL   POCT urine dip    Collection Time: 06/24/21 10:52 AM   Result Value Ref Range    LEUKOCYTE ESTERASE,UA small     NITRITE,UA neg     SL AMB POCT UROBILINOGEN neg     POCT URINE PROTEIN neg      PH,UA 5     BLOOD,UA large     SPECIFIC GRAVITY,UA 1 030     KETONES,UA neg     BILIRUBIN,UA neg     GLUCOSE, UA neg      COLOR,UA yellow     CLARITY,UA clear          Patient voided 30  mL in the office    Post void residual measured via bladder scanner to be 184 mL    Recent Results (from the past 1 hour(s))   POCT Measure PVR    Collection Time: 06/24/21 10:40 AM   Result Value Ref Range    POST-VOID RESIDUAL VOLUME, ML  mL         Cherie Zaragoza RN

## 2021-06-25 LAB — BACTERIA UR CULT: NORMAL

## 2021-07-13 ENCOUNTER — OFFICE VISIT (OUTPATIENT)
Dept: FAMILY MEDICINE CLINIC | Facility: CLINIC | Age: 66
End: 2021-07-13
Payer: MEDICARE

## 2021-07-13 VITALS
RESPIRATION RATE: 18 BRPM | SYSTOLIC BLOOD PRESSURE: 110 MMHG | BODY MASS INDEX: 28.94 KG/M2 | DIASTOLIC BLOOD PRESSURE: 80 MMHG | WEIGHT: 218.4 LBS | OXYGEN SATURATION: 99 % | HEIGHT: 73 IN | HEART RATE: 70 BPM | TEMPERATURE: 97 F

## 2021-07-13 DIAGNOSIS — E78.5 HYPERLIPIDEMIA, UNSPECIFIED HYPERLIPIDEMIA TYPE: ICD-10-CM

## 2021-07-13 DIAGNOSIS — Z95.5 STATUS POST PRIMARY ANGIOPLASTY WITH CORONARY STENT: ICD-10-CM

## 2021-07-13 DIAGNOSIS — N21.0 URINARY BLADDER STONE: Primary | ICD-10-CM

## 2021-07-13 DIAGNOSIS — M1A.0690 IDIOPATHIC CHRONIC GOUT OF KNEE WITHOUT TOPHUS, UNSPECIFIED LATERALITY: ICD-10-CM

## 2021-07-13 DIAGNOSIS — I25.5 CARDIOMYOPATHY, ISCHEMIC: ICD-10-CM

## 2021-07-13 DIAGNOSIS — M12.9 ARTHROPATHY: ICD-10-CM

## 2021-07-13 DIAGNOSIS — I26.99 PULMONARY EMBOLISM WITH INFARCTION (HCC): ICD-10-CM

## 2021-07-13 PROCEDURE — G0439 PPPS, SUBSEQ VISIT: HCPCS | Performed by: INTERNAL MEDICINE

## 2021-07-13 PROCEDURE — 1123F ACP DISCUSS/DSCN MKR DOCD: CPT | Performed by: INTERNAL MEDICINE

## 2021-07-13 PROCEDURE — 99214 OFFICE O/P EST MOD 30 MIN: CPT | Performed by: INTERNAL MEDICINE

## 2021-07-13 RX ORDER — TRAMADOL HYDROCHLORIDE 50 MG/1
100 TABLET ORAL AS NEEDED
Qty: 30 TABLET | Refills: 1 | Status: SHIPPED | OUTPATIENT
Start: 2021-07-13

## 2021-07-13 NOTE — PROGRESS NOTES
Assessment/Plan:   this 76-year-old male has suffered from bladder stones which his urologist has shot heard but still has small amount of stone material present that he passes now and then  He has been off his Plavix because of bleeding and still has intermittent of blood in his urine  He is due to go to his urologist and was told to be in touch with him as to when the urologist feels that he can resume his Plavix  It has been for 5 years since his LAD stent  Patient feels well otherwise and has had no episodes of chest pain  The patient has asked for tramadol refill in case he needs it for his knee or back pain  He does not take this regularly  Diet reviewed  Lifestyle modifications reviewed  Medications reviewed and ordered  Laboratory tests and studies reviewed and ordered  All patient's questions answered to patient satisfaction  Chief Complaint   Patient presents with   [facility] Wellness Visit     Bladder stone removal 3 months ago         Diagnoses and all orders for this visit:    Urinary bladder stone    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, unspecified laterality  -     traMADol (ULTRAM) 50 mg tablet; Take 2 tablets (100 mg total) by mouth as needed for moderate pain    Pulmonary embolism with infarction ([address] )    Cardiomyopathy, ischemic    Arthropathy    Hyperlipidemia, unspecified hyperlipidemia type    Status post primary angioplasty with coronary stent        Subjective:      this 76-year-old male has suffered from bladder stones which his urologist has shot heard but still has small amount of stone material present that he passes now and then  He has been off his Plavix because of bleeding and still has intermittent of blood in his urine  He is due to go to his urologist and was told to be in touch with him as to when the urologist feels that he can resume his Plavix  It has been for 5 years since his LAD stent    Patient feels well otherwise and has had no episodes of chest pain  The patient has asked for tramadol refill in case he needs it for his knee or back pain  He does not take this regularly  Patient ID: Bartolome Gilbert is a 74 y o  male          Current Outpatient Medications:     Alirocumab (Praluent) 150 MG/ML SOAJ, Inject 150 mg under the skin every 14 (fourteen) days, Disp: 2 pen, Rfl: 11    Ascorbic Acid (vitamin C) 1000 MG tablet, Take 1,000 mg by mouth daily, Disp: , Rfl:     Cholecalciferol 25 MCG (1000 UT) tablet, Take 1,000 Units by mouth daily, Disp: , Rfl:     lisinopril (ZESTRIL) 5 mg tablet, Take 2 5 mg by mouth daily , Disp: , Rfl:     metoprolol succinate (TOPROL-XL) 25 mg 24 hr tablet, Take 25 mg by mouth every evening , Disp: , Rfl:     multivitamin-iron-minerals-folic acid (CENTRUM) chewable tablet, Chew 1 tablet daily, Disp: , Rfl:     tadalafil (CIALIS) 20 MG tablet, Take 1 tablet (20 mg total) by mouth daily as needed for erectile dysfunction, Disp: 10 tablet, Rfl: 3    traMADol (ULTRAM) 50 mg tablet, Take 2 tablets (100 mg total) by mouth as needed for moderate pain, Disp: 30 tablet, Rfl: 1    clopidogrel (PLAVIX) 75 mg tablet, Take 1 tablet (75 mg total) by mouth daily (Patient not taking: Reported on 7/13/2021), Disp: 90 tablet, Rfl: 1    co-enzyme Q-10 100 mg capsule, Take 100 mg by mouth daily (Patient not taking: Reported on 6/24/2021), Disp: , Rfl:     HYDROcodone-acetaminophen (NORCO) 5-325 mg per tablet, 1-2 tab every 6 hr if needed for pain (Patient not taking: Reported on 7/13/2021), Disp: 10 tablet, Rfl: 0    loratadine (CLARITIN) 10 mg tablet, Take 10 mg by mouth daily (Patient not taking: Reported on 6/24/2021), Disp: , Rfl:     oxybutynin (DITROPAN) 5 mg tablet, , Disp: , Rfl:     tamsulosin (FLOMAX) 0 4 mg, Take 1 capsule (0 4 mg total) by mouth 2 (two) times a day (Patient taking differently: Take 0 4 mg by mouth every 12 (twelve) hours ), Disp: 90 capsule, Rfl: 3    Vascepa 1 g CAPS, TAKE 2 CAPSULES TWICE DAILY (Patient not taking: Reported on 6/17/2021), Disp: 360 capsule, Rfl: 3    The following portions of the patient's history were reviewed and updated as appropriate: allergies, current medications, past family history, past medical history, past social history, past surgical history and problem list     Review of Systems   Constitutional: Negative for appetite change, fatigue, fever and unexpected weight change  HENT: Negative for rhinorrhea, sinus pressure, sinus pain, sneezing and sore throat  Eyes: Negative for visual disturbance  Respiratory: Negative for cough, chest tightness, shortness of breath and wheezing  Cardiovascular: Negative for chest pain, palpitations and leg swelling  Gastrointestinal: Negative for abdominal distention, abdominal pain, blood in stool, constipation, diarrhea, nausea and vomiting  Endocrine: Negative for polydipsia and polyuria  Genitourinary: Positive for hematuria  Negative for decreased urine volume, difficulty urinating, dysuria and urgency  Musculoskeletal: Negative for arthralgias, back pain, joint swelling and neck pain  Skin: Negative for rash  Allergic/Immunologic: Negative for environmental allergies  Neurological: Negative for tremors, weakness, light-headedness, numbness and headaches  Hematological: Does not bruise/bleed easily  Psychiatric/Behavioral: Negative for agitation, behavioral problems, confusion and dysphoric mood  The patient is not nervous/anxious            Family History   Problem Relation Age of Onset   Levora Lucero Cancer Family         Bladder cancer    Diabetes Family     Heart attack Family     Hypertension Family        Past Medical History:   Diagnosis Date    Acute ST elevation myocardial infarction (STEMI) involving left anterior descending (LAD) coronary artery (HCC) 09/08/2016    Arthritis     low back and fingers    BCC (basal cell carcinoma of skin)     left shoulder and nose in past    Bladder stone     BPH with urinary obstruction     Coronary artery disease      2 stents    Elevated PSA     Erectile dysfunction     Gout     History of colonoscopy 2018    History of DVT in adulthood     RLE    Hypercholesteremia     Hypertension     [facility]ignant [facility] [address]     Prediabetes     Pulmonary embolism [facility] [address] [facility]     Right inguinal hernia     Sclerosing mesenteritis [facility] [address] [facility]     Seasonal allergies     Wears glasses        Past Surgical History:   Procedure Laterality Date    COLONOSCOPY      CORONARY STENT PLACEMENT      CYSTOSCOPY N/A 6/15/2021    Procedure: CYSTOSCOPY;  Surgeon: Sav Justin MD;  [facility];  Service: Urology    [place] REMOVE BLADDER STONE,>2 5 CM N/A 6/15/2021    Procedure: Rich Organ;  Surgeon: Sav Justin MD;  [facility];  Service: Urology    PROSTATE BIOPSY Bilateral 2011    TONSILLECTOMY      UMBILICAL HERNIA REPAIR         Social History     Socioeconomic History    Marital status: /Civil Union     Spouse name: None    Number of children: None    Years of education: None    Highest education level: None   Occupational History    Occupation: manager   Tobacco Use    Smoking status: Former Smoker     Types: Cigarettes     Quit date:      Years since quittin 5    Smokeless tobacco: Never Used   Substance and Sexual Activity    Alcohol use: Yes     Alcohol/week: 1 0 standard drinks     Types: 1 Cans of beer per week     Comment: beer    Drug use: No    Sexual activity: Yes     Partners: Female     Birth control/protection: None   Other Topics Concern    None   Social History Narrative    None     Social Determinants of Health     Financial Resource Strain:     Difficulty of Paying Living Expenses:    Food Insecurity:     Worried About Running Out of Food in the Last Year:     Ran Out of Food in the Last Year:    Transportation Needs:     Lack of Transportation (Medical):      Lack of Transportation (Non-Medical):    Physical Activity:     Days of Exercise per Week:     Minutes of Exercise per Session:    Stress:     Feeling of Stress :    Social Connections:     Frequency of Communication with Friends and Family:     Frequency of Social Gatherings with Friends and Family:     Attends Mu-ism Services:     Active Member of Clubs or Organizations:     Attends Club or Organization Meetings:     Marital Status:    Intimate Partner Violence:     Fear of Current or Ex-Partner:     Emotionally Abused:     Physically Abused:     Sexually Abused: Allergies   Allergen Reactions    No Active Allergies        BMI Counseling: Body mass index is 28 81 kg/m²  The BMI is above normal  Nutrition recommendations include decreasing portion sizes and moderation in carbohydrate intake  Exercise recommendations include moderate physical activity 150 minutes/week  No pharmacotherapy was ordered  Patient referred to PCP due to patient being overweight  Objective:    /80 (BP Location: Left arm, Patient Position: Sitting, Cuff Size: Adult)   Pulse 70   Temp (!) 97 °F (36 1 °C)   Resp 18   Ht 6' 1" (1 854 m)   Wt 99 1 kg (218 lb 6 4 oz)   SpO2 99%   BMI 28 81 kg/m²        Physical Exam  Constitutional:       General: He is not in acute distress  Appearance: He is well-developed  HENT:      Head: Normocephalic and atraumatic  Nose: Nose normal       Mouth/Throat:      Pharynx: No oropharyngeal exudate  Eyes:      General: No scleral icterus  Conjunctiva/sclera: Conjunctivae normal       Pupils: Pupils are equal, round, and reactive to light  Neck:      Thyroid: No thyromegaly  Vascular: No JVD  Trachea: No tracheal deviation  Cardiovascular:      Rate and Rhythm: Normal rate and regular rhythm  Heart sounds: Normal heart sounds  No murmur heard  No friction rub  No gallop  Pulmonary:      Effort: Pulmonary effort is normal  No respiratory distress  Breath sounds: No wheezing or rales  Chest:      Chest wall: No tenderness  Abdominal:      General: Bowel sounds are normal  There is no distension  Palpations: Abdomen is soft  There is no mass  Tenderness: There is no abdominal tenderness  There is no guarding or rebound  Musculoskeletal:         General: No deformity  Normal range of motion  Cervical back: Normal range of motion and neck supple  Lymphadenopathy:      Cervical: No cervical adenopathy  Skin:     General: Skin is warm and dry  Findings: No rash  Neurological:      Mental Status: He is alert and oriented to person, place, and time  Cranial Nerves: No cranial nerve deficit  Coordination: Coordination normal    Psychiatric:         Behavior: Behavior normal          Thought Content:  Thought content normal          Judgment: Judgment normal

## 2021-07-13 NOTE — PROGRESS NOTES
Assessment and Plan:     Problem List Items Addressed This Visit        Respiratory    Pulmonary embolism with infarction [facility])       Cardiovascular and Mediastinum    Cardiomyopathy, ischemic       Musculoskeletal and Integument    Arthropathy       Genitourinary    Urinary bladder stone - Primary    Relevant Medications    traMADol (ULTRAM) 50 mg tablet       Other    Gout    Relevant Medications    traMADol (ULTRAM) 50 mg tablet    Status post primary angioplasty with coronary stent    HLD (hyperlipidemia)           Preventive health issues were discussed with patient, and age appropriate screening tests were ordered as noted in patient's After Visit Summary  Personalized health advice and appropriate referrals for health education or preventive services given if needed, as noted in patient's After Visit Summary       History of Present Illness:     Patient presents for Medicare Annual Wellness visit    Patient Care Team:  Angelo Vines MD as PCP - General (Internal Medicine)  MD Angelo Pulido MD     Problem List:     Patient Active Problem List   Diagnosis    Malignant ne[facility] of [facility] [address] [facility]    Abnormal glucose level    Acute pain of right knee    Adenocarcinoma of prostate ([facility] [address] [facility]    Arthropathy    Cardiomyopathy, ischemic    Chest pain    Chronic bilateral low back pain without sciatica    Essential hypertension    Gout    Hx pulmonary embolism    Hyperlipidemia    Status post percutaneous transluminal coronary angioplasty    Primary osteoarthritis of both hands    Pulmonary embolism with infarction ([facility] [address] [facility]    Sclerosing mesenteritis ([facility] [address] [facility]    STEMI (ST elevation myocardial infarction) ([facility] [address] [facility]    Toxic myopathy    Coronary artery disease involving native coronary artery of native heart without angina pectoris    Other male erectile dysfunction    BPH with urinary obstruction    Status post primary angioplasty with coronary stent    Urinary tract infection with hematuria    Urgency of urination    Urinary bladder stone    HLD (hyperlipidemia)    S/P PTCA (percutaneous transluminal coronary angioplasty)      Past Medical and Surgical History:     Past Medical History:   Diagnosis Date    Acute ST elevation myocardial infarction (STEMI) involving left anterior descending (LAD) coronary artery (HCC) 09/08/2016    Arthritis     low back and fingers    BCC (basal cell carcinoma of skin)     left shoulder and nose in past    Bladder stone     BPH with urinary obstruction     Coronary artery disease      2 stents    Elevated PSA     Erectile dysfunction     Gout     History of colonoscopy 01/19/2018    History of DVT in adulthood     RLE    Hypercholesteremia     Hypertension     Malignant neoplasm prostate [facility] [address]     Prediabetes     Pulmonary embolism ([facility] [address] [facility]     Right inguinal hernia     Sclerosing mesenteritis ([facility] [address] [facility]     Seasonal allergies     Wears glasses      Past Surgical History:   Procedure Laterality Date    COLONOSCOPY      CORONARY STENT PLACEMENT      CYSTOSCOPY N/A 6/15/2021    Procedure: CYSTOSCOPY;  Surgeon: Casey Romero MD;  [facility];  Service: Urology    [place] REMOVE BLADDER STONE,>2 5 CM N/A 6/15/2021    Procedure: DXXMIUMUPHX HOLMIUM LASER BLADDER STONE;  Surgeon: Casey Romero MD;  [facility];  Service: Urology    PROSTATE BIOPSY Bilateral 2011    TONSILLECTOMY      UMBILICAL HERNIA REPAIR        Family History:     Family History   Problem Relation Age of Onset    Cancer Family         Bladder cancer    Diabetes Family     Heart attack Family     Hypertension Family       Social History:     Social History     Socioeconomic History    Marital status: /Civil Union     Spouse name: None    Number of children: None    Years of education: None    Highest education level: None   Occupational History    Occupation: manager   Tobacco Use    Smoking status: Former Smoker     Types: Cigarettes     Quit date: 24     Years since quittin 6    Smokeless tobacco: Never Used   Substance and Sexual Activity    Alcohol use: Yes     Alcohol/week: 1 0 standard drinks     Types: 1 Cans of beer per week     Comment: beer    Drug use: No    Sexual activity: Yes     Partners: Female     Birth control/protection: None   Other Topics Concern    None   Social History Narrative    None     Social Determinants of Health     Financial Resource Strain:     Difficulty of Paying Living Expenses:    Food Insecurity:     Worried About Running Out of Food in the Last Year:     Ran Out of Food in the Last Year:    Transportation Needs:     Lack of Transportation (Medical):      Lack of Transportation (Non-Medical):    Physical Activity:     Days of Exercise per Week:     Minutes of Exercise per Session:    Stress:     Feeling of Stress :    Social Connections:     Frequency of Communication with Friends and Family:     Frequency of Social Gatherings with Friends and Family:     Attends Pentecostalism Services:     Active Member of Clubs or Organizations:     Attends Club or Organization Meetings:     Marital Status:    Intimate Partner Violence:     Fear of Current or Ex-Partner:     Emotionally Abused:     Physically Abused:     Sexually Abused:       Medications and Allergies:     Current Outpatient Medications   Medication Sig Dispense Refill    Alirocumab (Praluent) 150 MG/ML SOAJ Inject 150 mg under the skin every 14 (fourteen) days 2 pen 11    Ascorbic Acid (vitamin C) 1000 MG tablet Take 1,000 mg by mouth daily      Cholecalciferol 25 MCG (1000 UT) tablet Take 1,000 Units by mouth daily      lisinopril (ZESTRIL) 5 mg tablet Take 2 5 mg by mouth daily       metoprolol succinate (TOPROL-XL) 25 mg 24 hr tablet Take 25 mg by mouth every evening       multivitamin-iron-minerals-folic acid (CENTRUM) chewable tablet Chew 1 tablet daily      tadalafil (CIALIS) 20 MG tablet Take 1 tablet (20 mg total) by mouth daily as needed for erectile dysfunction 10 tablet 3    traMADol (ULTRAM) 50 mg tablet Take 2 tablets (100 mg total) by mouth as needed for moderate pain 30 tablet 1    clopidogrel (PLAVIX) 75 mg tablet Take 1 tablet (75 mg total) by mouth daily (Patient not taking: Reported on 7/13/2021) 90 tablet 1    co-enzyme Q-10 100 mg capsule Take 100 mg by mouth daily (Patient not taking: Reported on 6/24/2021)      HYDROcodone-acetaminophen (NORCO) 5-325 mg per tablet 1-2 tab every 6 hr if needed for pain (Patient not taking: Reported on 7/13/2021) 10 tablet 0    loratadine (CLARITIN) 10 mg tablet Take 10 mg by mouth daily (Patient not taking: Reported on 6/24/2021)      oxybutynin (DITROPAN) 5 mg tablet  (Patient not taking: Reported on 7/13/2021)      tamsulosin (FLOMAX) 0 4 mg Take 1 capsule (0 4 mg total) by mouth 2 (two) times a day (Patient taking differently: Take 0 4 mg by mouth every 12 (twelve) hours ) 90 capsule 3    Vascepa 1 g CAPS TAKE 2 CAPSULES TWICE DAILY (Patient not taking: Reported on 6/17/2021) 360 capsule 3     No current facility-administered medications for this visit       Allergies   Allergen Reactions    No Active Allergies       Immunizations:     Immunization History   Administered Date(s) Administered    Fluzone Split Quad 0 5 mL 10/13/2016    INFLUENZA 10/13/2016, 11/09/2018    Influenza, injectable, quadrivalent, preservative free 0 5 mL 11/09/2018    SARS-CoV-2 / COVID-19 mRNA IM (Pf[employer]) 03/21/2021, 04/11/2021      Health Maintenance:         Topic Date Due    Colorectal Cancer Screening  01/19/2028    Hepatitis C Screening  Completed         Topic Date Due    Pneumococcal Vaccine: 65+ Years (1 of 2 - PPSV23) Never done    DTaP,Tdap,and Td Vaccines (1 - Tdap) Never done    Influenza Vaccine (1) 09/01/2021      Medicare Health Risk Assessment:     /80 (BP Location: Left arm, Patient Position: Sitting, Cuff Size: Adult)   Pulse 70   Temp (!) 97 °F (36 1 °C) Resp 18   Ht 6' 1" (1 854 m)   Wt 99 1 kg (218 lb 6 4 oz)   SpO2 99%   BMI 28 81 kg/m²      Stella Brandon is here for his Subsequent Wellness visit  Health Risk Assessment:   Patient rates overall health as very good  Patient feels that their physical health rating is slightly worse  Patient is very satisfied with their life  Eyesight was rated as same  Hearing was rated as same  Patient feels that their emotional and mental health rating is same  Patients states they are never, rarely angry  Patient states they are sometimes unusually tired/fatigued  Pain experienced in the last 7 days has been some  Patient's pain rating has been 4/10  Patient states that he has experienced weight loss or gain in last 6 months  Depression Screening:   PHQ-2 Score: 0      Fall Risk Screening: In the past year, patient has experienced: no history of falling in past year      Home Safety:  Patient does not have trouble with stairs inside or outside of their home  Patient has working smoke alarms and has working carbon monoxide detector  Home safety hazards include: none  Nutrition:   Current diet is Other (please comment)  Vegetarian  Medications:   Patient is currently taking over-the-counter supplements  OTC medications include: see medication list  Patient is able to manage medications  Activities of Daily Living (ADLs)/Instrumental Activities of Daily Living (IADLs):   Walk and transfer into and out of bed and chair?: Yes  Dress and groom yourself?: Yes    Bathe or shower yourself?: Yes    Feed yourself? Yes  Do your laundry/housekeeping?: Yes  Manage your money, pay your bills and track your expenses?: Yes  Make your own meals?: Yes    Do your own shopping?: Yes    Previous Hospitalizations:   Any hospitalizations or ED visits within the last 12 months?: Yes    How many hospitalizations have you had in the last year?: 1-2    Advance Care Planning:   Living will: No    Durable POA for healthcare:  No PREVENTIVE SCREENINGS      Cardiovascular Screening:    General: Screening Not Indicated and History Lipid Disorder      Diabetes Screening:     General: Screening Current      Colorectal Cancer Screening:     General: Screening Current      Prostate Cancer Screening:    General: History Prostate Cancer      Abdominal Aortic Aneurysm (AAA) Screening:    Risk factors include: age between 73-69 yo and tobacco use        Lung Cancer Screening:     General: Screening Not Indicated      Hepatitis C Screening:    General: Screening Current    Screening, Brief Intervention, and Referral to Treatment (SBIRT)    Screening      Single Item Drug Screening:  How often have you used an illegal drug (including marijuana) or a prescription medication for non-medical reasons in the past year? never    Single Item Drug Screen Score: 0  Interpretation: Negative screen for possible drug use disorder      Carlos Davalos MD

## 2021-08-12 ENCOUNTER — OFFICE VISIT (OUTPATIENT)
Dept: UROLOGY | Facility: MEDICAL CENTER | Age: 66
End: 2021-08-12
Payer: MEDICARE

## 2021-08-12 VITALS
HEART RATE: 88 BPM | BODY MASS INDEX: 28.89 KG/M2 | DIASTOLIC BLOOD PRESSURE: 80 MMHG | HEIGHT: 73 IN | WEIGHT: 218 LBS | SYSTOLIC BLOOD PRESSURE: 120 MMHG

## 2021-08-12 DIAGNOSIS — N40.1 BPH WITH URINARY OBSTRUCTION: Primary | ICD-10-CM

## 2021-08-12 DIAGNOSIS — N21.0 BLADDER STONE: ICD-10-CM

## 2021-08-12 DIAGNOSIS — N13.8 BPH WITH URINARY OBSTRUCTION: Primary | ICD-10-CM

## 2021-08-12 PROCEDURE — 99213 OFFICE O/P EST LOW 20 MIN: CPT | Performed by: UROLOGY

## 2021-08-12 RX ORDER — CHLORAL HYDRATE 500 MG
1000 CAPSULE ORAL DAILY
COMMUNITY
End: 2022-05-17

## 2021-08-12 NOTE — PROGRESS NOTES
HISTORY:    Now two months out from laser of large, very hard bladder stone, see op report  I was not able to fragment all of the stone because of bleeding from his big prostate  Since that time he has done well  Had lots of urgency frequency in the immediate postop  But that has gotten a lot better  Now can hold it with only nocturia x1, does have frequency urgency when he first wakes up, but that settles down by mid morning and the rest of the day is fine    Still passing some tiny particles  ASSESSMENT / PLAN:    I told him after that surgery he would likely need a second procedure to clean up the rest the stones, and performed TURP to prevent the stones happening again  His prostate is too big for laser or Uro lift  This procedure is elective, he is doing well now, and certainly reasonable to schedule  when he wants  There is some chance stone particles passing could cause  He will call if he wants to schedule, otherwise one year    The following portions of the patient's history were reviewed and updated as appropriate: allergies, current medications, past family history, past medical history, past social history, past surgical history and problem list     Review of Systems   All other systems reviewed and are negative  Objective:     Physical Exam  Constitutional:       General: He is not in acute distress  Appearance: He is well-developed  He is not diaphoretic  HENT:      Head: Normocephalic and atraumatic  Eyes:      General: No scleral icterus  Pulmonary:      Effort: Pulmonary effort is normal    Skin:     Coloration: Skin is not pale  Neurological:      Mental Status: He is alert and oriented to person, place, and time  Psychiatric:         Behavior: Behavior normal          Thought Content:  Thought content normal          Judgment: Judgment normal            0   Lab Value Date/Time    PSA 4 2 (H) 05/10/2019 1046    PSA 3 4 07/26/2017 1706   ]  BUN   Date Value Ref Range Status   06/24/2021 14 5 - 25 mg/dL Final   07/24/2020 16 7 - 25 mg/dL Final     Creatinine   Date Value Ref Range Status   06/24/2021 0 86 0 60 - 1 30 mg/dL Final     Comment:     Standardized to IDMS reference method     No components found for: CBC      Patient Active Problem List   Diagnosis    Malignant [facility]oplasm of prostate ([facility] )    Abnormal glucose level    Acute pain of right knee    Adenocarcinoma of prostate ([facility] )    Arthropathy    Cardiomyopathy, ischemic    Chest pain    Chronic bilateral low back pain without sciatica    Essential hypertension    Gout    Hx pulmonary embolism    Hyperlipidemia    Status post percutaneous transluminal coronary angioplasty    Primary osteoarthritis of both hands    Pulmonary embolism with infarction ([facility] )    Sclerosing mesenteritis ([place] [address] )    STEMI (ST elevation myocardial infarction) ([place] [address] )    Toxic myopathy    Coronary artery disease involving native coronary artery of native heart without angina pectoris    Other male erectile dysfunction    BPH with urinary obstruction    Status post primary angioplasty with coronary stent    Urinary tract infection with hematuria    Urgency of urination    Bladder stone    HLD (hyperlipidemia)    S/P PTCA (percutaneous transluminal coronary angioplasty)        Diagnoses and all orders for this visit:    BPH with urinary obstruction  -     PSA Total, Diagnostic; Future    Bladder stone    Other orders  -     Omega-3 Fatty Acids (fish oil) 1,000 mg; Take 1,000 mg by mouth daily           Patient ID: Timbo Foy is a 74 y o  male        Current Outpatient Medications:     Alirocumab (Praluent) 150 MG/ML SOAJ, Inject 150 mg under the skin every 14 (fourteen) days, Disp: 2 pen, Rfl: 11    Ascorbic Acid (vitamin C) 1000 MG tablet, Take 1,000 mg by mouth daily, Disp: , Rfl:     Cholecalciferol 25 MCG (1000 UT) tablet, Take 1,000 Units by mouth daily, Disp: , Rfl:     clopidogrel (PLAVIX) 75 mg tablet, Take 1 tablet (75 mg total) by mouth daily, Disp: 90 tablet, Rfl: 1    lisinopril (ZESTRIL) 5 mg tablet, Take 2 5 mg by mouth daily , Disp: , Rfl:     loratadine (CLARITIN) 10 mg tablet, Take 10 mg by mouth daily , Disp: , Rfl:     metoprolol succinate (TOPROL-XL) 25 mg 24 hr tablet, Take 25 mg by mouth every evening , Disp: , Rfl:     multivitamin-iron-minerals-folic acid (CENTRUM) chewable tablet, Chew 1 tablet daily, Disp: , Rfl:     Omega-3 Fatty Acids (fish oil) 1,000 mg, Take 1,000 mg by mouth daily, Disp: , Rfl:     tadalafil (CIALIS) 20 MG tablet, Take 1 tablet (20 mg total) by mouth daily as needed for erectile dysfunction, Disp: 10 tablet, Rfl: 3    tamsulosin (FLOMAX) 0 4 mg, Take 1 capsule (0 4 mg total) by mouth 2 (two) times a day (Patient taking differently: Take 0 4 mg by mouth every 12 (twelve) hours ), Disp: 90 capsule, Rfl: 3    traMADol (ULTRAM) 50 mg tablet, Take 2 tablets (100 mg total) by mouth as needed for moderate pain, Disp: 30 tablet, Rfl: 1    Past Medical History:   Diagnosis Date    Acute ST elevation myocardial infarction (STEMI) involving left anterior descending (LAD) coronary artery (HCC) 09/08/2016    Arthritis     low back and fingers    BCC (basal cell carcinoma of skin)     left shoulder and nose in past    Bladder stone     BPH with urinary obstruction     Coronary artery disease      2 stents    Elevated PSA     Erectile dysfunction     Gout     History of colonoscopy 01/19/2018    History of DVT in adulthood     RLE    Hypercholesteremia     Hypertension     Malignant neoplasm prostate ([address] )     Prediabetes     Pulmonary embolism (HCC)     Right inguinal hernia     Sclerosing mesenteritis ([address] )     Seasonal allergies     Wears glasses        Past Surgical History:   Procedure Laterality Date    COLONOSCOPY      CORONARY STENT PLACEMENT      CYSTOSCOPY N/A 6/15/2021    Procedure: CYSTOSCOPY;  Surgeon: Caridad Romberg, MD;  Location: [facility] OR;  Service: Urology    UT REMOVE BLADDER STONE,>2 5 CM N/A 6/15/2021    Procedure: Jimbo Masters BLADDER STONE;  Surgeon: Juventino Lange MD;  Location: [facility];  Service: Urology    PROSTATE BIOPSY Bilateral 2011   [facility]         Social History

## 2021-09-16 DIAGNOSIS — R35.1 NOCTURIA: ICD-10-CM

## 2021-09-16 RX ORDER — TAMSULOSIN HYDROCHLORIDE 0.4 MG/1
0.4 CAPSULE ORAL 2 TIMES DAILY
Qty: 180 CAPSULE | Refills: 1 | Status: SHIPPED | OUTPATIENT
Start: 2021-09-16 | End: 2022-03-15

## 2021-11-19 DIAGNOSIS — I25.5 CARDIOMYOPATHY, ISCHEMIC: ICD-10-CM

## 2021-11-19 RX ORDER — CLOPIDOGREL BISULFATE 75 MG/1
TABLET ORAL
Qty: 90 TABLET | Refills: 1 | Status: SHIPPED | OUTPATIENT
Start: 2021-11-19 | End: 2022-05-23

## 2021-12-14 ENCOUNTER — TELEPHONE (OUTPATIENT)
Dept: FAMILY MEDICINE CLINIC | Facility: CLINIC | Age: 66
End: 2021-12-14

## 2021-12-14 ENCOUNTER — OFFICE VISIT (OUTPATIENT)
Dept: FAMILY MEDICINE CLINIC | Facility: CLINIC | Age: 66
End: 2021-12-14
Payer: MEDICARE

## 2021-12-14 VITALS
HEART RATE: 63 BPM | SYSTOLIC BLOOD PRESSURE: 128 MMHG | HEIGHT: 73 IN | OXYGEN SATURATION: 98 % | WEIGHT: 217 LBS | TEMPERATURE: 97.7 F | DIASTOLIC BLOOD PRESSURE: 86 MMHG | BODY MASS INDEX: 28.76 KG/M2

## 2021-12-14 DIAGNOSIS — M12.9 ARTHROPATHY: ICD-10-CM

## 2021-12-14 DIAGNOSIS — N21.0 BLADDER STONE: Primary | ICD-10-CM

## 2021-12-14 DIAGNOSIS — N13.8 BPH WITH URINARY OBSTRUCTION: ICD-10-CM

## 2021-12-14 DIAGNOSIS — F11.20 CONTINUOUS OPIOID DEPENDENCE (HCC): ICD-10-CM

## 2021-12-14 DIAGNOSIS — M25.561 ACUTE PAIN OF RIGHT KNEE: ICD-10-CM

## 2021-12-14 DIAGNOSIS — I21.02 ST ELEVATION MYOCARDIAL INFARCTION INVOLVING LEFT ANTERIOR DESCENDING (LAD) CORONARY ARTERY (HCC): ICD-10-CM

## 2021-12-14 DIAGNOSIS — E08.65 DIABETES MELLITUS DUE TO UNDERLYING CONDITION WITH HYPERGLYCEMIA, WITHOUT LONG-TERM CURRENT USE OF INSULIN (HCC): ICD-10-CM

## 2021-12-14 DIAGNOSIS — N40.1 BPH WITH URINARY OBSTRUCTION: ICD-10-CM

## 2021-12-14 DIAGNOSIS — E78.2 MIXED HYPERLIPIDEMIA: ICD-10-CM

## 2021-12-14 DIAGNOSIS — R73.09 ABNORMAL GLUCOSE LEVEL: ICD-10-CM

## 2021-12-14 DIAGNOSIS — N21.0 BLADDER STONE: ICD-10-CM

## 2021-12-14 PROBLEM — Z78.9 STATIN INTOLERANCE: Status: ACTIVE | Noted: 2021-11-03

## 2021-12-14 PROCEDURE — 20610 DRAIN/INJ JOINT/BURSA W/O US: CPT | Performed by: INTERNAL MEDICINE

## 2021-12-14 PROCEDURE — 99214 OFFICE O/P EST MOD 30 MIN: CPT | Performed by: INTERNAL MEDICINE

## 2021-12-14 RX ORDER — ICOSAPENT ETHYL 1000 MG/1
2 CAPSULE ORAL 2 TIMES DAILY
Qty: 360 CAPSULE | Refills: 3 | Status: SHIPPED | OUTPATIENT
Start: 2021-12-14 | End: 2022-05-17

## 2022-01-15 ENCOUNTER — IMMUNIZATIONS (OUTPATIENT)
Dept: FAMILY MEDICINE CLINIC | Facility: HOSPITAL | Age: 67
End: 2022-01-15

## 2022-01-15 DIAGNOSIS — Z23 ENCOUNTER FOR IMMUNIZATION: Primary | ICD-10-CM

## 2022-01-15 PROCEDURE — 0001A COVID-19 PFIZER VACC 0.3 ML: CPT

## 2022-01-15 PROCEDURE — 91300 COVID-19 PFIZER VACC 0.3 ML: CPT

## 2022-03-15 DIAGNOSIS — R35.1 NOCTURIA: ICD-10-CM

## 2022-03-15 RX ORDER — TAMSULOSIN HYDROCHLORIDE 0.4 MG/1
0.4 CAPSULE ORAL 2 TIMES DAILY
Qty: 180 CAPSULE | Refills: 1 | Status: SHIPPED | OUTPATIENT
Start: 2022-03-15

## 2022-04-27 ENCOUNTER — RA CDI HCC (OUTPATIENT)
Dept: OTHER | Facility: HOSPITAL | Age: 67
End: 2022-04-27

## 2022-04-27 NOTE — PROGRESS NOTES
K65 4  [facility]  coding opportunities          Chart Reviewed number of suggestions sent to Provider: 1     Patients Insurance     Medicare Insurance: [employer]

## 2022-05-13 ENCOUNTER — APPOINTMENT (OUTPATIENT)
Dept: LAB | Facility: MEDICAL CENTER | Age: 67
End: 2022-05-13
Payer: MEDICARE

## 2022-05-13 DIAGNOSIS — N21.0 BLADDER STONE: ICD-10-CM

## 2022-05-13 DIAGNOSIS — I21.02 ST ELEVATION MYOCARDIAL INFARCTION INVOLVING LEFT ANTERIOR DESCENDING (LAD) CORONARY ARTERY (HCC): ICD-10-CM

## 2022-05-13 DIAGNOSIS — E08.65 DIABETES MELLITUS DUE TO UNDERLYING CONDITION WITH HYPERGLYCEMIA, WITHOUT LONG-TERM CURRENT USE OF INSULIN (HCC): ICD-10-CM

## 2022-05-13 DIAGNOSIS — N13.8 BPH WITH URINARY OBSTRUCTION: ICD-10-CM

## 2022-05-13 DIAGNOSIS — M25.561 ACUTE PAIN OF RIGHT KNEE: ICD-10-CM

## 2022-05-13 DIAGNOSIS — N40.1 BPH WITH URINARY OBSTRUCTION: ICD-10-CM

## 2022-05-13 DIAGNOSIS — M12.9 ARTHROPATHY: ICD-10-CM

## 2022-05-13 LAB
ALBUMIN SERPL BCP-MCNC: 4.1 G/DL (ref 3.5–5)
ALP SERPL-CCNC: 52 U/L (ref 46–116)
ALT SERPL W P-5'-P-CCNC: 30 U/L (ref 12–78)
ANION GAP SERPL CALCULATED.3IONS-SCNC: 4 MMOL/L (ref 4–13)
AST SERPL W P-5'-P-CCNC: 16 U/L (ref 5–45)
BASOPHILS # BLD AUTO: 0.07 THOUSANDS/ΜL (ref 0–0.1)
BASOPHILS NFR BLD AUTO: 1 % (ref 0–1)
BILIRUB SERPL-MCNC: 0.64 MG/DL (ref 0.2–1)
BILIRUB UR QL STRIP: NEGATIVE
BUN SERPL-MCNC: 15 MG/DL (ref 5–25)
CALCIUM SERPL-MCNC: 9.7 MG/DL (ref 8.3–10.1)
CHLORIDE SERPL-SCNC: 109 MMOL/L (ref 100–108)
CHOLEST SERPL-MCNC: 169 MG/DL
CLARITY UR: CLEAR
CO2 SERPL-SCNC: 28 MMOL/L (ref 21–32)
COLOR UR: NORMAL
CREAT SERPL-MCNC: 1.12 MG/DL (ref 0.6–1.3)
EOSINOPHIL # BLD AUTO: 0.12 THOUSAND/ΜL (ref 0–0.61)
EOSINOPHIL NFR BLD AUTO: 2 % (ref 0–6)
ERYTHROCYTE [DISTWIDTH] IN BLOOD BY AUTOMATED COUNT: 13.2 % (ref 11.6–15.1)
EST. AVERAGE GLUCOSE BLD GHB EST-MCNC: 123 MG/DL
GFR SERPL CREATININE-BSD FRML MDRD: 67 ML/MIN/1.73SQ M
GLUCOSE P FAST SERPL-MCNC: 124 MG/DL (ref 65–99)
GLUCOSE UR STRIP-MCNC: NEGATIVE MG/DL
HBA1C MFR BLD: 5.9 %
HCT VFR BLD AUTO: 46.4 % (ref 36.5–49.3)
HDLC SERPL-MCNC: 48 MG/DL
HGB BLD-MCNC: 14.9 G/DL (ref 12–17)
HGB UR QL STRIP.AUTO: NEGATIVE
IMM GRANULOCYTES # BLD AUTO: 0.03 THOUSAND/UL (ref 0–0.2)
IMM GRANULOCYTES NFR BLD AUTO: 0 % (ref 0–2)
KETONES UR STRIP-MCNC: NEGATIVE MG/DL
LDLC SERPL CALC-MCNC: 85 MG/DL (ref 0–100)
LDLC SERPL DIRECT ASSAY-MCNC: 87 MG/DL (ref 0–100)
LEUKOCYTE ESTERASE UR QL STRIP: NEGATIVE
LYMPHOCYTES # BLD AUTO: 2.28 THOUSANDS/ΜL (ref 0.6–4.47)
LYMPHOCYTES NFR BLD AUTO: 32 % (ref 14–44)
MCH RBC QN AUTO: 30.9 PG (ref 26.8–34.3)
MCHC RBC AUTO-ENTMCNC: 32.1 G/DL (ref 31.4–37.4)
MCV RBC AUTO: 96 FL (ref 82–98)
MONOCYTES # BLD AUTO: 0.68 THOUSAND/ΜL (ref 0.17–1.22)
MONOCYTES NFR BLD AUTO: 10 % (ref 4–12)
NEUTROPHILS # BLD AUTO: 3.91 THOUSANDS/ΜL (ref 1.85–7.62)
NEUTS SEG NFR BLD AUTO: 55 % (ref 43–75)
NITRITE UR QL STRIP: NEGATIVE
NONHDLC SERPL-MCNC: 121 MG/DL
NRBC BLD AUTO-RTO: 0 /100 WBCS
PH UR STRIP.AUTO: 6 [PH]
PLATELET # BLD AUTO: 303 THOUSANDS/UL (ref 149–390)
PMV BLD AUTO: 11 FL (ref 8.9–12.7)
POTASSIUM SERPL-SCNC: 4.4 MMOL/L (ref 3.5–5.3)
PROT SERPL-MCNC: 7.7 G/DL (ref 6.4–8.2)
PROT UR STRIP-MCNC: NEGATIVE MG/DL
RBC # BLD AUTO: 4.82 MILLION/UL (ref 3.88–5.62)
SODIUM SERPL-SCNC: 141 MMOL/L (ref 136–145)
SP GR UR STRIP.AUTO: 1.02 (ref 1–1.03)
TRIGL SERPL-MCNC: 179 MG/DL
TSH SERPL DL<=0.05 MIU/L-ACNC: 1.18 UIU/ML (ref 0.45–4.5)
URATE SERPL-MCNC: 6.3 MG/DL (ref 4.2–8)
UROBILINOGEN UR STRIP-ACNC: <2 MG/DL
WBC # BLD AUTO: 7.09 THOUSAND/UL (ref 4.31–10.16)

## 2022-05-13 PROCEDURE — 84443 ASSAY THYROID STIM HORMONE: CPT

## 2022-05-13 PROCEDURE — 84550 ASSAY OF BLOOD/URIC ACID: CPT

## 2022-05-13 PROCEDURE — 83721 ASSAY OF BLOOD LIPOPROTEIN: CPT

## 2022-05-13 PROCEDURE — 81003 URINALYSIS AUTO W/O SCOPE: CPT

## 2022-05-13 PROCEDURE — 36415 COLL VENOUS BLD VENIPUNCTURE: CPT

## 2022-05-13 PROCEDURE — 83036 HEMOGLOBIN GLYCOSYLATED A1C: CPT

## 2022-05-13 PROCEDURE — 87086 URINE CULTURE/COLONY COUNT: CPT

## 2022-05-13 PROCEDURE — 80061 LIPID PANEL: CPT

## 2022-05-13 PROCEDURE — 85025 COMPLETE CBC W/AUTO DIFF WBC: CPT

## 2022-05-13 PROCEDURE — 80053 COMPREHEN METABOLIC PANEL: CPT

## 2022-05-14 LAB — BACTERIA UR CULT: NORMAL

## 2022-05-16 RX ORDER — FLUOROURACIL 50 MG/G
CREAM TOPICAL
COMMUNITY
Start: 2022-02-21

## 2022-05-17 ENCOUNTER — OFFICE VISIT (OUTPATIENT)
Dept: FAMILY MEDICINE CLINIC | Facility: CLINIC | Age: 67
End: 2022-05-17
Payer: MEDICARE

## 2022-05-17 VITALS
BODY MASS INDEX: 29.95 KG/M2 | TEMPERATURE: 98.2 F | SYSTOLIC BLOOD PRESSURE: 120 MMHG | RESPIRATION RATE: 16 BRPM | WEIGHT: 226 LBS | OXYGEN SATURATION: 96 % | DIASTOLIC BLOOD PRESSURE: 82 MMHG | HEIGHT: 73 IN | HEART RATE: 56 BPM

## 2022-05-17 DIAGNOSIS — Z98.61 STATUS POST PERCUTANEOUS TRANSLUMINAL CORONARY ANGIOPLASTY: ICD-10-CM

## 2022-05-17 DIAGNOSIS — E08.65 DIABETES MELLITUS DUE TO UNDERLYING CONDITION WITH HYPERGLYCEMIA, WITHOUT LONG-TERM CURRENT USE OF INSULIN (HCC): ICD-10-CM

## 2022-05-17 DIAGNOSIS — I25.10 CORONARY ARTERY DISEASE INVOLVING NATIVE CORONARY ARTERY OF NATIVE HEART WITHOUT ANGINA PECTORIS: ICD-10-CM

## 2022-05-17 DIAGNOSIS — F11.20 CONTINUOUS OPIOID DEPENDENCE (HCC): ICD-10-CM

## 2022-05-17 DIAGNOSIS — C61 ADENOCARCINOMA OF PROSTATE (HCC): ICD-10-CM

## 2022-05-17 DIAGNOSIS — I26.99 PULMONARY EMBOLISM WITH INFARCTION (HCC): ICD-10-CM

## 2022-05-17 DIAGNOSIS — C61 MALIGNANT NEOPLASM OF PROSTATE (HCC): ICD-10-CM

## 2022-05-17 DIAGNOSIS — M10.00 IDIOPATHIC GOUT, UNSPECIFIED CHRONICITY, UNSPECIFIED SITE: ICD-10-CM

## 2022-05-17 DIAGNOSIS — E78.2 MIXED HYPERLIPIDEMIA: ICD-10-CM

## 2022-05-17 DIAGNOSIS — E78.2 MIXED HYPERLIPIDEMIA: Primary | ICD-10-CM

## 2022-05-17 PROBLEM — Z87.448 HISTORY OF BLADDER STONE: Status: ACTIVE | Noted: 2022-04-12

## 2022-05-17 PROBLEM — R97.20 ELEVATED PSA: Status: ACTIVE | Noted: 2022-04-12

## 2022-05-17 PROBLEM — N40.0 BENIGN PROSTATIC HYPERPLASIA: Status: ACTIVE | Noted: 2022-04-12

## 2022-05-17 PROCEDURE — 99214 OFFICE O/P EST MOD 30 MIN: CPT | Performed by: INTERNAL MEDICINE

## 2022-05-17 RX ORDER — OMEGA-3-ACID ETHYL ESTERS 1 G/1
2 CAPSULE, LIQUID FILLED ORAL 2 TIMES DAILY
Qty: 120 CAPSULE | Refills: 11 | Status: SHIPPED | OUTPATIENT
Start: 2022-05-17 | End: 2022-07-11

## 2022-05-17 RX ORDER — ALIROCUMAB 150 MG/ML
INJECTION, SOLUTION SUBCUTANEOUS
Qty: 1 ML | Refills: 3 | Status: SHIPPED | OUTPATIENT
Start: 2022-05-17 | End: 2022-08-09

## 2022-05-17 RX ORDER — NALOXONE HYDROCHLORIDE 4 MG/.1ML
SPRAY NASAL
Qty: 1 EACH | Refills: 1 | Status: SHIPPED | OUTPATIENT
Start: 2022-05-17

## 2022-05-17 NOTE — PROGRESS NOTES
Assessment/Plan: This 68-year-old male has mixed hyperlipidemia has gained some weight his direct LDL is 85 and usually runs in the 60s and his triglyceride is over 150  He has stopped his Vascepa because of cost issues  He will resume his cautious diet hopefully lower his LDL  He continues Praluent every 2 weeks  He will take generic lobe oz to twice a day [place]  The patient's hemoglobin A1c is up to 6 at dieting should lower this number and will be repeated next time  Patient takes tramadol on occasion for knee pain 100 mg  Narcan for rescue was ordered for the patient to have at home because this is a week opiate  He uses tramadol less than once a month and is considered low risk  Diet reviewed  Lifestyle modifications reviewed  Medications reviewed and ordered  Laboratory tests and studies reviewed and ordered  All patient's questions answered to patient satisfaction  Chief Complaint   Patient presents with    Follow-up     Routine  Labs in chart for review  There are no diagnoses linked to this encounter  Subjective: This 68-year-old male has mixed hyperlipidemia has gained some weight his direct LDL is 85 and usually runs in the 60s and his triglyceride is over 150  He has stopped his Vascepa because of cost issues  He will resume his cautious diet hopefully lower his LDL  He continues Praluent every 2 weeks  He will take generic lobe oz to twice a day [place]  The patient's hemoglobin A1c is up to 6 at dieting should lower this number and will be repeated next time  Patient takes tramadol on occasion for knee pain 100 mg  Narcan for rescue was ordered for the patient to have at home because this is a week opiate  He uses tramadol less than once a month and is considered low risk  Patient ID: Cesar Sheets is a 69 y o  male          Current Outpatient Medications:     Ascorbic Acid (vitamin C) 1000 MG tablet, Take 1,000 mg by mouth daily, Disp: , Rfl:    Cholecalciferol 25 MCG (1000 UT) tablet, Take 1,000 Units by mouth daily, Disp: , Rfl:     clopidogrel (PLAVIX) 75 mg tablet, TAKE 1 TABLET BY MOUTH EVERY DAY, Disp: 90 tablet, Rfl: 1    loratadine (CLARITIN) 10 mg tablet, Take 10 mg by mouth daily , Disp: , Rfl:     metoprolol succinate (TOPROL-XL) 25 mg 24 hr tablet, Take 25 mg by mouth every evening , Disp: , Rfl:     multivitamin-iron-minerals-folic acid (CENTRUM) chewable tablet, Chew 1 tablet daily, Disp: , Rfl:     Omega-3 Fatty Acids (fish oil) 1,000 mg, Take 1,000 mg by mouth daily, Disp: , Rfl:     Praluent 150 MG/ML SOAJ, INJECT 1ML UNDER THE SKIN EVERY 14 DAYS, Disp: 1 mL, Rfl: 3    tadalafil (CIALIS) 20 MG tablet, Take 1 tablet (20 mg total) by mouth daily as needed for erectile dysfunction, Disp: 10 tablet, Rfl: 3    tamsulosin (FLOMAX) 0 4 mg, TAKE 1 CAPSULE (0 4 MG TOTAL) BY MOUTH 2 (TWO) TIMES A DAY (Patient taking differently: Take 0 4 mg by mouth in the morning), Disp: 180 capsule, Rfl: 1    traMADol (ULTRAM) 50 mg tablet, Take 2 tablets (100 mg total) by mouth as needed for moderate pain, Disp: 30 tablet, Rfl: 1    fluorouracil (EFUDEX) 5 % cream, PLEASE SEE ATTACHED FOR DETAILED DIRECTIONS (Patient not taking: Reported on 5/17/2022), Disp: , Rfl:     Icosapent Ethyl (Vascepa) 1 g CAPS, Take 2 capsules (2 g total) by mouth 2 (two) times a day (Patient not taking: Reported on 5/17/2022), Disp: 360 capsule, Rfl: 3    Icosapent Ethyl 1 g CAPS, Take 2 capsules (2 g total) by mouth 2 (two) times a day (Patient not taking: Reported on 5/17/2022), Disp: 360 capsule, Rfl: 3    lisinopril (ZESTRIL) 5 mg tablet, Take 2 5 mg by mouth daily  (Patient not taking: No sig reported), Disp: , Rfl:     The following portions of the patient's history were reviewed and updated as appropriate: allergies, current medications, past family history, past medical history, past social history, past surgical history and problem list     Review of Systems Constitutional: Negative for appetite change, fatigue, fever and unexpected weight change  HENT: Negative for rhinorrhea, sinus pressure, sinus pain, sneezing and sore throat  Eyes: Negative for visual disturbance  Respiratory: Negative for cough, chest tightness, shortness of breath and wheezing  Cardiovascular: Negative for chest pain, palpitations and leg swelling  Gastrointestinal: Negative for abdominal distention, abdominal pain, blood in stool, constipation, diarrhea, nausea and vomiting  Endocrine: Negative for polydipsia and polyuria  Genitourinary: Negative for decreased urine volume, difficulty urinating, dysuria, hematuria and urgency  Musculoskeletal: Negative for arthralgias, back pain, joint swelling and neck pain  Skin: Negative for rash  Allergic/Immunologic: Negative for environmental allergies  Neurological: Negative for tremors, weakness, light-headedness, numbness and headaches  Hematological: Does not bruise/bleed easily  Psychiatric/Behavioral: Negative for agitation, behavioral problems, confusion and dysphoric mood  The patient is not nervous/anxious            Family History   Problem Relation Age of Onset    Cancer Family         Bladder cancer    Diabetes Family     Heart attack Family     Hypertension Family     Heart attack Father     Gout Father     Cancer Father     Prostate cancer Father     Heart disease Mother     Brain cancer Brother     Diabetes Brother        Past Medical History:   Diagnosis Date    Acute ST elevation myocardial infarction (STEMI) involving left anterior descending (LAD) coronary artery (HCC) 09/08/2016    Arthritis     low back and fingers    BCC (basal cell carcinoma of skin)     left shoulder and nose in past    Bladder stone     BPH with urinary obstruction     Coronary artery disease      2 stents    Elevated PSA     Erectile dysfunction     Gout     History of colonoscopy 01/19/2018    History of DVT in adulthood     RLE    Hypercholesteremia     Hypertension     [facility]     Prediabetes     Pulmonary embolism (HCC)     Right inguinal hernia     Sclerosing mesenteritis (HCC)     Seasonal allergies     Wears glasses        Past Surgical History:   Procedure Laterality Date    COLONOSCOPY      CORONARY STENT PLACEMENT      CYSTOSCOPY N/A 6/15/2021    Procedure: CYSTOSCOPY;  Surgeon: Abebe Vázquez MD;  [facility];  Service: Urology    NH REMOVE BLADDER STONE,>2 5 CM N/A 6/15/2021    Procedure: Chetna Caputo;  Surgeon: Abebe Vázquez MD;  [facility];  Service: Urology    PROSTATE BIOPSY Bilateral 2011    TONSILLECTOMY      UMBILICAL HERNIA REPAIR         Social History     Socioeconomic History    Marital status: /Civil Union     Spouse name: None    Number of children: None    Years of education: None    Highest education level: None   Occupational History    Occupation: manager   Tobacco Use    Smoking status: Former Smoker     Types: Cigarettes     Quit date:      Years since quittin 4    Smokeless tobacco: Never Used   Substance and Sexual Activity    Alcohol use: Yes     Alcohol/week: 1 0 standard drink     Types: 1 Cans of beer per week     Comment: beer    Drug use: No    Sexual activity: Yes     Partners: Female     Birth control/protection: None   Other Topics Concern    None   Social History Narrative    None     Social Determinants of Health     Financial Resource Strain: Not on file   Food Insecurity: Not on file   Transportation Needs: Not on file   Physical Activity: Not on file   Stress: Not on file   Social Connections: Not on file   Intimate Partner Violence: Not on file   Housing Stability: Not on file       Allergies   Allergen Reactions    No Active Allergies        BMI Counseling: Body mass index is 29 82 kg/m²   The BMI is above normal  Nutrition recommendations include decreasing portion sizes, limiting drinks that contain sugar, moderation in carbohydrate intake, increasing intake of lean protein, reducing intake of saturated and trans fat and reducing intake of cholesterol  Exercise recommendations include exercising 3-5 times per week  No pharmacotherapy was ordered  Patient referred to PCP  Rationale for BMI follow-up plan is due to patient being overweight or obese  Objective:    /82 (BP Location: Left arm, Patient Position: Sitting, Cuff Size: Adult)   Pulse 56   Temp 98 2 °F (36 8 °C)   Resp 16   Ht 6' 1" (1 854 m)   Wt 103 kg (226 lb)   SpO2 96%   BMI 29 82 kg/m²        Physical Exam  Constitutional:       General: He is not in acute distress  Appearance: He is well-developed  HENT:      Head: Normocephalic and atraumatic  Nose: Nose normal       Mouth/Throat:      Pharynx: No oropharyngeal exudate  Eyes:      General: No scleral icterus  Conjunctiva/sclera: Conjunctivae normal       Pupils: Pupils are equal, round, and reactive to light  Neck:      Thyroid: No thyromegaly  Vascular: No JVD  Trachea: No tracheal deviation  Cardiovascular:      Rate and Rhythm: Normal rate and regular rhythm  Heart sounds: Normal heart sounds  No murmur heard  No friction rub  No gallop  Pulmonary:      Effort: Pulmonary effort is normal  No respiratory distress  Breath sounds: No wheezing or rales  Chest:      Chest wall: No tenderness  Abdominal:      General: Bowel sounds are normal  There is no distension  Palpations: Abdomen is soft  There is no mass  Tenderness: There is no abdominal tenderness  There is no guarding or rebound  Musculoskeletal:         General: No deformity  Normal range of motion  Cervical back: Normal range of motion and neck supple  Lymphadenopathy:      Cervical: No cervical adenopathy  Skin:     General: Skin is warm and dry  Findings: No rash     Neurological:      Mental Status: He is alert and oriented to person, place, and time  Cranial Nerves: No cranial nerve deficit  Coordination: Coordination normal    Psychiatric:         Behavior: Behavior normal          Thought Content:  Thought content normal          Judgment: Judgment normal

## 2022-05-23 DIAGNOSIS — I25.5 CARDIOMYOPATHY, ISCHEMIC: ICD-10-CM

## 2022-05-23 RX ORDER — CLOPIDOGREL BISULFATE 75 MG/1
TABLET ORAL
Qty: 90 TABLET | Refills: 1 | Status: SHIPPED | OUTPATIENT
Start: 2022-05-23

## 2022-07-09 DIAGNOSIS — E78.2 MIXED HYPERLIPIDEMIA: ICD-10-CM

## 2022-07-11 RX ORDER — OMEGA-3-ACID ETHYL ESTERS 1 G/1
2 CAPSULE, LIQUID FILLED ORAL 2 TIMES DAILY
Qty: 360 CAPSULE | Refills: 4 | Status: SHIPPED | OUTPATIENT
Start: 2022-07-11

## 2022-08-09 DIAGNOSIS — I25.10 CORONARY ARTERY DISEASE INVOLVING NATIVE CORONARY ARTERY OF NATIVE HEART WITHOUT ANGINA PECTORIS: ICD-10-CM

## 2022-08-09 DIAGNOSIS — M10.00 IDIOPATHIC GOUT, UNSPECIFIED CHRONICITY, UNSPECIFIED SITE: ICD-10-CM

## 2022-08-09 DIAGNOSIS — E78.2 MIXED HYPERLIPIDEMIA: ICD-10-CM

## 2022-08-09 DIAGNOSIS — Z98.61 STATUS POST PERCUTANEOUS TRANSLUMINAL CORONARY ANGIOPLASTY: ICD-10-CM

## 2022-08-09 DIAGNOSIS — C61 ADENOCARCINOMA OF PROSTATE (HCC): ICD-10-CM

## 2022-08-09 RX ORDER — ALIROCUMAB 150 MG/ML
INJECTION, SOLUTION SUBCUTANEOUS
Qty: 1 ML | Refills: 6 | Status: SHIPPED | OUTPATIENT
Start: 2022-08-09 | End: 2022-09-01

## 2022-09-01 DIAGNOSIS — Z98.61 STATUS POST PERCUTANEOUS TRANSLUMINAL CORONARY ANGIOPLASTY: ICD-10-CM

## 2022-09-01 DIAGNOSIS — C61 ADENOCARCINOMA OF PROSTATE (HCC): ICD-10-CM

## 2022-09-01 DIAGNOSIS — E78.2 MIXED HYPERLIPIDEMIA: ICD-10-CM

## 2022-09-01 DIAGNOSIS — I25.10 CORONARY ARTERY DISEASE INVOLVING NATIVE CORONARY ARTERY OF NATIVE HEART WITHOUT ANGINA PECTORIS: ICD-10-CM

## 2022-09-01 DIAGNOSIS — M10.00 IDIOPATHIC GOUT, UNSPECIFIED CHRONICITY, UNSPECIFIED SITE: ICD-10-CM

## 2022-09-02 RX ORDER — ALIROCUMAB 150 MG/ML
INJECTION, SOLUTION SUBCUTANEOUS
Qty: 15 ML | Refills: 3 | Status: SHIPPED | OUTPATIENT
Start: 2022-09-02

## 2022-09-04 NOTE — TELEPHONE ENCOUNTER
These are pensof 1ml each  1 pen every 2 weeks  6 pens for 3 mos  Pparently they need a diagnostic code

## 2022-09-12 DIAGNOSIS — R35.1 NOCTURIA: ICD-10-CM

## 2022-09-12 RX ORDER — TAMSULOSIN HYDROCHLORIDE 0.4 MG/1
0.4 CAPSULE ORAL DAILY
Qty: 180 CAPSULE | Refills: 3 | Status: SHIPPED | OUTPATIENT
Start: 2022-09-12

## 2022-09-15 ENCOUNTER — APPOINTMENT (OUTPATIENT)
Dept: LAB | Facility: MEDICAL CENTER | Age: 67
End: 2022-09-15
Payer: MEDICARE

## 2022-09-15 DIAGNOSIS — I26.99 PULMONARY EMBOLISM WITH INFARCTION (HCC): ICD-10-CM

## 2022-09-15 DIAGNOSIS — E08.65 DIABETES MELLITUS DUE TO UNDERLYING CONDITION WITH HYPERGLYCEMIA, WITHOUT LONG-TERM CURRENT USE OF INSULIN (HCC): ICD-10-CM

## 2022-09-15 DIAGNOSIS — E78.2 MIXED HYPERLIPIDEMIA: ICD-10-CM

## 2022-09-15 LAB
ALBUMIN SERPL BCP-MCNC: 3.8 G/DL (ref 3.5–5)
ALP SERPL-CCNC: 50 U/L (ref 46–116)
ALT SERPL W P-5'-P-CCNC: 25 U/L (ref 12–78)
ANION GAP SERPL CALCULATED.3IONS-SCNC: 3 MMOL/L (ref 4–13)
AST SERPL W P-5'-P-CCNC: 15 U/L (ref 5–45)
BASOPHILS # BLD AUTO: 0.06 THOUSANDS/ΜL (ref 0–0.1)
BASOPHILS NFR BLD AUTO: 1 % (ref 0–1)
BILIRUB SERPL-MCNC: 0.71 MG/DL (ref 0.2–1)
BILIRUB UR QL STRIP: NEGATIVE
BUN SERPL-MCNC: 14 MG/DL (ref 5–25)
CALCIUM SERPL-MCNC: 9.2 MG/DL (ref 8.3–10.1)
CHLORIDE SERPL-SCNC: 109 MMOL/L (ref 96–108)
CLARITY UR: CLEAR
CO2 SERPL-SCNC: 27 MMOL/L (ref 21–32)
COLOR UR: YELLOW
CREAT SERPL-MCNC: 0.95 MG/DL (ref 0.6–1.3)
EOSINOPHIL # BLD AUTO: 0.12 THOUSAND/ΜL (ref 0–0.61)
EOSINOPHIL NFR BLD AUTO: 2 % (ref 0–6)
ERYTHROCYTE [DISTWIDTH] IN BLOOD BY AUTOMATED COUNT: 12.8 % (ref 11.6–15.1)
GFR SERPL CREATININE-BSD FRML MDRD: 81 ML/MIN/1.73SQ M
GLUCOSE P FAST SERPL-MCNC: 89 MG/DL (ref 65–99)
GLUCOSE UR STRIP-MCNC: NEGATIVE MG/DL
HCT VFR BLD AUTO: 46 % (ref 36.5–49.3)
HGB BLD-MCNC: 14.9 G/DL (ref 12–17)
HGB UR QL STRIP.AUTO: NEGATIVE
IMM GRANULOCYTES # BLD AUTO: 0.02 THOUSAND/UL (ref 0–0.2)
IMM GRANULOCYTES NFR BLD AUTO: 0 % (ref 0–2)
KETONES UR STRIP-MCNC: ABNORMAL MG/DL
LDLC SERPL DIRECT ASSAY-MCNC: 85 MG/DL (ref 0–100)
LEUKOCYTE ESTERASE UR QL STRIP: NEGATIVE
LYMPHOCYTES # BLD AUTO: 2.68 THOUSANDS/ΜL (ref 0.6–4.47)
LYMPHOCYTES NFR BLD AUTO: 34 % (ref 14–44)
MCH RBC QN AUTO: 30.6 PG (ref 26.8–34.3)
MCHC RBC AUTO-ENTMCNC: 32.4 G/DL (ref 31.4–37.4)
MCV RBC AUTO: 95 FL (ref 82–98)
MONOCYTES # BLD AUTO: 0.94 THOUSAND/ΜL (ref 0.17–1.22)
MONOCYTES NFR BLD AUTO: 12 % (ref 4–12)
NEUTROPHILS # BLD AUTO: 4.1 THOUSANDS/ΜL (ref 1.85–7.62)
NEUTS SEG NFR BLD AUTO: 51 % (ref 43–75)
NITRITE UR QL STRIP: NEGATIVE
NRBC BLD AUTO-RTO: 0 /100 WBCS
PH UR STRIP.AUTO: 6.5 [PH]
PLATELET # BLD AUTO: 282 THOUSANDS/UL (ref 149–390)
PMV BLD AUTO: 10.7 FL (ref 8.9–12.7)
POTASSIUM SERPL-SCNC: 4 MMOL/L (ref 3.5–5.3)
PROT SERPL-MCNC: 7.8 G/DL (ref 6.4–8.4)
PROT UR STRIP-MCNC: NEGATIVE MG/DL
RBC # BLD AUTO: 4.87 MILLION/UL (ref 3.88–5.62)
SODIUM SERPL-SCNC: 139 MMOL/L (ref 135–147)
SP GR UR STRIP.AUTO: 1.02 (ref 1–1.03)
TRIGL SERPL-MCNC: 129 MG/DL
UROBILINOGEN UR STRIP-ACNC: <2 MG/DL
WBC # BLD AUTO: 7.92 THOUSAND/UL (ref 4.31–10.16)

## 2022-09-15 PROCEDURE — 80053 COMPREHEN METABOLIC PANEL: CPT

## 2022-09-15 PROCEDURE — 36415 COLL VENOUS BLD VENIPUNCTURE: CPT

## 2022-09-15 PROCEDURE — 81003 URINALYSIS AUTO W/O SCOPE: CPT

## 2022-09-15 PROCEDURE — 83721 ASSAY OF BLOOD LIPOPROTEIN: CPT

## 2022-09-15 PROCEDURE — 85025 COMPLETE CBC W/AUTO DIFF WBC: CPT

## 2022-09-15 PROCEDURE — 84478 ASSAY OF TRIGLYCERIDES: CPT

## 2022-09-15 PROCEDURE — 83036 HEMOGLOBIN GLYCOSYLATED A1C: CPT

## 2022-09-16 LAB
EST. AVERAGE GLUCOSE BLD GHB EST-MCNC: 123 MG/DL
HBA1C MFR BLD: 5.9 %

## 2022-09-27 ENCOUNTER — OFFICE VISIT (OUTPATIENT)
Dept: FAMILY MEDICINE CLINIC | Facility: CLINIC | Age: 67
End: 2022-09-27
Payer: MEDICARE

## 2022-09-27 VITALS
SYSTOLIC BLOOD PRESSURE: 120 MMHG | WEIGHT: 221.2 LBS | HEIGHT: 73 IN | HEART RATE: 54 BPM | RESPIRATION RATE: 14 BRPM | DIASTOLIC BLOOD PRESSURE: 78 MMHG | BODY MASS INDEX: 29.31 KG/M2 | OXYGEN SATURATION: 98 % | TEMPERATURE: 97.7 F

## 2022-09-27 DIAGNOSIS — K65.4 SCLEROSING MESENTERITIS (HCC): ICD-10-CM

## 2022-09-27 DIAGNOSIS — R73.09 ABNORMAL GLUCOSE LEVEL: ICD-10-CM

## 2022-09-27 DIAGNOSIS — R73.03 PREDIABETES: ICD-10-CM

## 2022-09-27 DIAGNOSIS — I10 ESSENTIAL HYPERTENSION: ICD-10-CM

## 2022-09-27 DIAGNOSIS — I26.99 PULMONARY EMBOLISM WITH INFARCTION (HCC): ICD-10-CM

## 2022-09-27 DIAGNOSIS — Z00.00 MEDICARE ANNUAL WELLNESS VISIT, SUBSEQUENT: Primary | ICD-10-CM

## 2022-09-27 DIAGNOSIS — M12.9 ARTHROPATHY: ICD-10-CM

## 2022-09-27 DIAGNOSIS — C61 ADENOCARCINOMA OF PROSTATE (HCC): ICD-10-CM

## 2022-09-27 DIAGNOSIS — N40.1 BPH WITH URINARY OBSTRUCTION: ICD-10-CM

## 2022-09-27 DIAGNOSIS — I21.02 ST ELEVATION MYOCARDIAL INFARCTION INVOLVING LEFT ANTERIOR DESCENDING (LAD) CORONARY ARTERY (HCC): ICD-10-CM

## 2022-09-27 DIAGNOSIS — I25.10 CORONARY ARTERY DISEASE INVOLVING NATIVE CORONARY ARTERY OF NATIVE HEART WITHOUT ANGINA PECTORIS: ICD-10-CM

## 2022-09-27 DIAGNOSIS — F11.11 OPIOID USE DISORDER, MILD, IN SUSTAINED REMISSION, ON MAINTENANCE THERAPY (HCC): ICD-10-CM

## 2022-09-27 DIAGNOSIS — N21.0 BLADDER STONE: ICD-10-CM

## 2022-09-27 DIAGNOSIS — N13.8 BPH WITH URINARY OBSTRUCTION: ICD-10-CM

## 2022-09-27 DIAGNOSIS — G72.2 TOXIC MYOPATHY: ICD-10-CM

## 2022-09-27 DIAGNOSIS — M1A.0690 IDIOPATHIC CHRONIC GOUT OF KNEE WITHOUT TOPHUS, UNSPECIFIED LATERALITY: ICD-10-CM

## 2022-09-27 PROCEDURE — G0439 PPPS, SUBSEQ VISIT: HCPCS | Performed by: INTERNAL MEDICINE

## 2022-09-27 PROCEDURE — 99214 OFFICE O/P EST MOD 30 MIN: CPT | Performed by: INTERNAL MEDICINE

## 2022-09-27 PROCEDURE — 93000 ELECTROCARDIOGRAM COMPLETE: CPT | Performed by: INTERNAL MEDICINE

## 2022-09-27 RX ORDER — TRAMADOL HYDROCHLORIDE 50 MG/1
100 TABLET ORAL DAILY PRN
Qty: 60 TABLET | Refills: 1 | Status: SHIPPED | OUTPATIENT
Start: 2022-09-27

## 2022-09-27 NOTE — PROGRESS NOTES
Diabetic Foot Exam    Patient's shoes and socks removed  Right Foot/Ankle   Right Foot Inspection  Skin Exam: skin normal and skin intact  No dry skin, no warmth, no callus, no erythema, no maceration, no abnormal color, no pre-ulcer, no ulcer and no callus  Toe Exam: ROM and strength within normal limits  Sensory   Monofilament testing: intact    Vascular  Capillary refills: < 3 seconds  The right DP pulse is 2+  The right PT pulse is 2+  Left Foot/Ankle  Left Foot Inspection  Skin Exam: callus  Toe Exam: ROM and strength within normal limits  Sensory   Monofilament testing: intact    Vascular  Capillary refills: < 3 seconds  The left DP pulse is 2+  The left PT pulse is 2+  Assign Risk Category  Deformity present  No loss of protective sensation  No weak pulses  Risk: 0        Answers for HPI/ROS submitted by the patient on 9/20/2022  How would you rate your overall health?: very good  Compared to last year, how is your physical health?: same  In general, how satisfied are you with your life?: very satisfied  Compared to last year, how is your eyesight?: same  Compared to last year, how is your hearing?: same  Compared to last year, how is your emotional/mental health?: same  How often is anger a problem for you?: sometimes  How often do you feel unusually tired/fatigued?: sometimes  In the past 7 days, how much pain have you experienced?: some  If you answered "some" or "a lot", please rate the severity of your pain on a scale of 1 to 10 (1 being the least severe pain and 10 being the most intense pain)  : 2/10  In the past 6 months, have you lost or gained 10 pounds without trying?: No  One or more falls in the last year: No  Do you have trouble with the stairs inside or outside your home?: No  Does your home have working smoke alarms?: Yes  Does your home have a carbon monoxide monitor?: No  Which safety hazards (if any) have you experienced in your home? Please select all that apply  Worthy Aden none  How would you describe your current diet?  Please select all that apply : Low Carb  Additional Comments: Fasting M/F  In addition to prescription medications, are you taking any over-the-counter supplements?: Yes  If yes, what supplements are you taking?: extra strength tylenol  Can you manage your medications?: Yes  Are you currently taking any opioid medications?: No  Can you walk and transfer into and out of your bed and chair?: Yes  Can you dress and groom yourself?: Yes  Can you bathe or shower yourself?: Yes  Can you feed yourself?: Yes  Can you do your laundry/ housekeeping?: Yes  Can you manage your money, pay your bills, and track your expenses?: Yes  Can you make your own meals?: Yes  Can you do your own shopping?: Yes  Within the last 12 months, have you had any hospitalizations or Emergency Department visits?: No  Do you have a living will?: No  Do you have a Durable POA (Power of ) for healthcare decisions?: No  Do you have an Advanced Directive for end of life decisions?: No  How often have you used an illegal drug (including marijuana) or a prescription medication for non-medical reasons in the past year?: never  What is the typical number of drinks you consume in a day?: 0  What is the typical number of drinks you consume in a week?: 1  How often did you have a drink containing alcohol in the past year?: monthly or less  How many drinks did you have on a typical day  when you were drinking in the past year?: 0  How often did you have 6 or more drinks on one occasion in the past year?: never

## 2022-09-27 NOTE — PROGRESS NOTES
Assessment and Plan: This 77-year-old male is here for Medicare wellness subsequent  He has had his 3rd COVID booster recently  He plans on getting a flu shot in the near future  He will have a Prevnar 20 at the next visit in 6 months  He had a colonoscopy 3 years ago and will have Cologuard screening this year  Patient has multiple bladder stones and is seeing Urology  In addition he has BPH that is symptomatic  A surgical procedures plan by Urology  He is on Plavix once daily for past stent and as prophylaxis for pulmonary embolism which he had in the past approximately 10 days following hernia repair  He will be speaking to his Cardiology about the need for temporally stopping his Plavix and for cardiac clearance  Abelardo Li He is active physically and has no cardiac symptomatology  EKG is stable with mild first-degree AV block borderline, and evidence of old anteroseptal MI which is unchanged from previous with poor R-wave progression  Hypertension is well controlled and he was able to stop his lisinopril 2 5 mg because of low blood pressures  The patient has pre diabetes with hemoglobin A1c of 5 9  He has hyperlipidemia and is treated with praluent 150 mg every 2 weeks  His direct LDL is 84 and not quite at target but he is planning on losing more weight  His triglyceride is now normal with over-the-counter fish oil twice daily and appropriate dieting  The patient has a history of inflammatory arthritis which he occasionally gets  He uses tramadol approximately 12 times year and was given a prescription  Occasionally needs his knee injected  The patient's history of sclerosing mesenteritis which is stable    Problem List Items Addressed This Visit        Digestive    Sclerosing mesenteritis [facility])       Respiratory    Pulmonary embolism with infarction [facility])       Cardiovascular and Mediastinum    Essential hypertension    STEMI (ST elevation myocardial infarction) ([facility] [address] )    Coronary artery disease involving native coronary artery of native heart without angina pectoris    Relevant Orders    Cologuard    CBC and differential    Comprehensive metabolic panel    Hemoglobin A1C    LDL cholesterol, direct    Triglycerides       Musculoskeletal and Integument    Arthropathy    Relevant Orders    Cologuard    CBC and differential    Comprehensive metabolic panel    Hemoglobin A1C    LDL cholesterol, direct    Triglycerides    Toxic myopathy       Genitourinary    Adenocarcinoma of prostate (HCC)    BPH with urinary obstruction    Bladder stone    Relevant Medications    traMADol (ULTRAM) 50 mg tablet       Other    Abnormal glucose level    Relevant Orders    Hemoglobin A1C    Gout    Relevant Medications    traMADol (ULTRAM) 50 mg tablet    Prediabetes    Opioid use disorder, mild, in sustained remission, on maintenance therapy ([facility] )      Other Visit Diagnoses     Medicare annual wellness visit, subsequent    -  Primary    Relevant Orders    POCT ECG (Completed)    Cologuard    CBC and differential    Comprehensive metabolic panel    Hemoglobin A1C    LDL cholesterol, direct    Triglycerides          Depression Screening and Follow-up Plan: Patient was screened for depression during today's encounter  They screened negative with a PHQ-2 score of 0  Preventive health issues were discussed with patient, and age appropriate screening tests were ordered as noted in patient's After Visit Summary  Personalized health advice and appropriate referrals for health education or preventive services given if needed, as noted in patient's After Visit Summary  History of Present Illness:     Patient presents for a Medicare Wellness Visit    This 53-year-old male is here for Medicare wellness subsequent  He has had his 3rd COVID booster recently  He plans on getting a flu shot in the near future  He will have a Prevnar 20 at the next visit in 6 months    He had a colonoscopy 3 years ago and will have Cologuard screening this year  Patient has multiple bladder stones and is seeing Urology  In addition he has BPH that is symptomatic  A surgical procedures plan by Urology  He is on Plavix once daily for past stent and as prophylaxis for pulmonary embolism which he had in the past approximately 10 days following hernia repair  He will be speaking to his Cardiology about the need for temporally stopping his Plavix and for cardiac clearance  Lakeline Side He is active physically and has no cardiac symptomatology  EKG is stable with mild first-degree AV block borderline, and evidence of old anteroseptal MI which is unchanged from previous with poor R-wave progression  Hypertension is well controlled and he was able to stop his lisinopril 2 5 mg because of low blood pressures  The patient has pre diabetes with hemoglobin A1c of 5 9  He has hyperlipidemia and is treated with praluent 150 mg every 2 weeks  His direct LDL is 84 and not quite at target but he is planning on losing more weight  His triglyceride is now normal with over-the-counter fish oil twice daily and appropriate dieting  The patient has a history of inflammatory arthritis which he occasionally gets  He uses tramadol approximately 12 times year and was given a prescription  Occasionally needs his knee injected  The patient's history of sclerosing mesenteritis which is stable  Patient Care Team:  Yoni Payan MD as PCP - General (Internal Medicine)  MD Yoni Keller MD     Review of Systems:     Review of Systems   Constitutional: Negative for appetite change, fatigue, fever and unexpected weight change  HENT: Negative for rhinorrhea, sinus pressure, sinus pain, sneezing and sore throat  Eyes: Negative for visual disturbance  Respiratory: Negative for cough, chest tightness, shortness of breath and wheezing  Cardiovascular: Negative for chest pain, palpitations and leg swelling  Gastrointestinal: Negative for abdominal distention, abdominal pain, blood in stool, constipation, diarrhea, nausea and vomiting  Endocrine: Negative for polydipsia and polyuria  Genitourinary: Negative for decreased urine volume, difficulty urinating, dysuria, hematuria and urgency  Musculoskeletal: Negative for arthralgias, back pain, joint swelling and neck pain  Skin: Negative for rash  Allergic/Immunologic: Negative for environmental allergies  Neurological: Negative for tremors, weakness, light-headedness, numbness and headaches  Hematological: Does not bruise/bleed easily  Psychiatric/Behavioral: Negative for agitation, behavioral problems, confusion and dysphoric mood  The patient is not nervous/anxious           Problem List:     Patient Active Problem List   Diagnosis    Malignant neoplasm of prostate ([facility] )    Abnormal glucose level    Acute pain of right knee    [facility]    Arthropathy    Cardiomyopathy, ischemic    Chest pain    Chronic bilateral low back pain without sciatica    Essential hypertension    Gout    Hx pulmonary embolism    Hyperlipidemia    Status post percutaneous transluminal coronary angioplasty    Primary osteoarthritis of both hands    Pulmonary embolism with infarction ([facility] )    Sclerosing mesenteritis ([facility] )    STEMI (ST elevation myocardial infarction) ([place] [address] )    Toxic myopathy    Coronary artery disease involving native coronary artery of native heart without angina pectoris    Other male erectile dysfunction    BPH with urinary obstruction    Status post primary angioplasty with coronary stent    Urinary tract infection with hematuria    Urgency of urination    Bladder stone    HLD (hyperlipidemia)    S/P PTCA (percutaneous transluminal coronary angioplasty)    Statin intolerance    Elevated PSA    Benign prostatic hyperplasia    History of bladder stone    Prediabetes    Opioid use disorder, mild, in sustained remission, on maintenance therapy [facility])      Past Medical and Surgical History:     Past Medical History:   Diagnosis Date    Acute ST elevation myocardial infarction (STEMI) involving left anterior descending (LAD) coronary artery ([facility] ) 09/08/2016    Arthritis     low back and fingers    BCC (basal cell carcinoma of skin)     left shoulder and nose in past    Bladder stone     BPH with urinary obstruction     Coronary artery disease      2 stents    Elevated PSA     Erectile dysfunction     Gout     History of colonoscopy 01/19/2018    History of DVT in adulthood     RLE    Hypercholesteremia     Hypertension     Malignant neoplasm prostate ([facility] )     Prediabetes     Pulmonary embolism ([place] [address] )     Right inguinal hernia     Sclerosing mesenteritis ([place] [address] )     Seasonal allergies     Wears glasses      Past Surgical History:   Procedure Laterality Date    COLONOSCOPY      CORONARY STENT PLACEMENT      CYSTOSCOPY N/A 6/15/2021    Procedure: CYSTOSCOPY;  Surgeon: Temitope Pineda MD;  [facility]: [facility];  Service: Urology    RI REMOVE BLADDER STONE,>2 5 CM N/A 6/15/2021    Procedure: [id number] HOLMIUM LASER BLADDER STONE;  Surgeon: Temitope Pineda MD;  [facility];  Service: Urology    PROSTATE BIOPSY Bilateral 2011    TONSILLECTOMY      UMBILICAL HERNIA REPAIR        Family History:     Family History   Problem Relation Age of Onset    Cancer Family         Bladder cancer    Diabetes Family     Heart attack Family     Hypertension Family     Heart attack Father     Gout Father     Cancer Father     Prostate cancer Father     Heart disease Mother     Brain cancer Brother     Diabetes Brother       Social History:     Social History     Socioeconomic History    Marital status: /Civil Union     Spouse name: None    Number of children: None    Years of education: None    Highest education level: None   Occupational History    Occupation: manager   Tobacco Use    Smoking status: Former Smoker     Types: Cigarettes     Quit date:      Years since quittin 7    Smokeless tobacco: Never Used   Vaping Use    Vaping Use: Never used   Substance and Sexual Activity    Alcohol use: Yes     Alcohol/week: 1 0 standard drink     Types: 1 Cans of beer per week     Comment: beer    Drug use: No    Sexual activity: Yes     Partners: Female     Birth control/protection: None   Other Topics Concern    None   Social History Narrative    None     Social Determinants of Health     Financial Resource Strain: Low Risk     Difficulty of Paying Living Expenses: Not hard at all   Food Insecurity: Not on file   Transportation Needs: No Transportation Needs    Lack of Transportation (Medical): No    Lack of Transportation (Non-Medical): No   Physical Activity: Not on file   Stress: Not on file   Social Connections: Not on file   Intimate Partner Violence: Not on file   Housing Stability: Not on file      Medications and Allergies:     Current Outpatient Medications   Medication Sig Dispense Refill    Ascorbic Acid (vitamin C) 1000 MG tablet Take 1,000 mg by mouth daily      Cholecalciferol 25 MCG (1000 UT) tablet Take 1,000 Units by mouth daily      clopidogrel (PLAVIX) 75 mg tablet TAKE 1 TABLET BY MOUTH EVERY DAY 90 tablet 1    loratadine (CLARITIN) 10 mg tablet Take 10 mg by mouth daily       metoprolol succinate (TOPROL-XL) 25 mg 24 hr tablet Take 25 mg by mouth every evening       multivitamin-iron-minerals-folic acid (CENTRUM) chewable tablet Chew 1 tablet daily      naloxone (NARCAN) 4 mg/0 1 mL nasal spray Administer 1 spray into a nostril  If no response after 2-3 minutes, give another dose in the other nostril using a new spray  1 each 1    omega-3-acid ethyl esters (LOVAZA) 1 g capsule TAKE 2 CAPSULES (2 G TOTAL) BY MOUTH IN THE MORNING AND 2 CAPSULES (2 G TOTAL) IN THE EVENING   360 capsule 4    Praluent 150 MG/ML SOAJ INJECT 1ML UNDER THE SKIN EVERY 14 DAYS 15 mL 3    tadalafil (CIALIS) 20 MG tablet Take 1 tablet (20 mg total) by mouth daily as needed for erectile dysfunction 10 tablet 3    tamsulosin (FLOMAX) 0 4 mg Take 1 capsule (0 4 mg total) by mouth in the morning 180 capsule 3    traMADol (ULTRAM) 50 mg tablet Take 2 tablets (100 mg total) by mouth daily as needed for moderate pain 60 tablet 1    fluorouracil (EFUDEX) 5 % cream PLEASE SEE ATTACHED FOR DETAILED DIRECTIONS (Patient not taking: No sig reported)      lisinopril (ZESTRIL) 5 mg tablet Take 2 5 mg by mouth daily  (Patient not taking: No sig reported)       No current facility-administered medications for this visit  Allergies   Allergen Reactions    No Active Allergies       Immunizations:     Immunization History   Administered Date(s) Administered    COVID-19 PFIZER VACCINE 0 3 ML IM 03/21/2021, 04/11/2021, 01/15/2022    COVID-19 Pfizer vac (Robert-sucrose, gray cap) 12 yr+ IM 08/04/2022    Fluzone Split Quad 0 5 mL 10/13/2016    INFLUENZA 10/13/2016, 11/09/2018, 08/21/2021    Influenza, injectable, quadrivalent, preservative free 0 5 mL 11/09/2018      Health Maintenance:         Topic Date Due    Colorectal Cancer Screening  01/19/2028    Hepatitis C Screening  Completed         Topic Date Due    Hepatitis A Vaccine (1 of 2 - Risk 2-dose series) Never done    Pneumococcal Vaccine: 65+ Years (1 - PCV) Never done    Hepatitis B Vaccine (1 of 3 - Risk 3-dose series) Never done    Influenza Vaccine (1) 09/01/2022      Medicare Screening Tests and Risk Assessments:     Tana Longo is here for his Subsequent Wellness visit  Health Risk Assessment:   Patient rates overall health as very good  Patient feels that their physical health rating is same  Patient is very satisfied with their life  Eyesight was rated as same  Hearing was rated as same  Patient feels that their emotional and mental health rating is same  Patients states they are sometimes angry   Patient states they are sometimes unusually tired/fatigued  Pain experienced in the last 7 days has been some  Patient's pain rating has been 2/10  Patient states that he has experienced no weight loss or gain in last 6 months  Depression Screening:   PHQ-2 Score: 0      Fall Risk Screening: In the past year, patient has experienced: no history of falling in past year      Home Safety:  Patient does not have trouble with stairs inside or outside of their home  Patient has working smoke alarms and has no working carbon monoxide detector  Home safety hazards include: none  Nutrition:   Current diet is Low Carb  Fasting M/F    Medications:   Patient is currently taking over-the-counter supplements  OTC medications include: extra strength tylenol  Patient is able to manage medications  Activities of Daily Living (ADLs)/Instrumental Activities of Daily Living (IADLs):   Walk and transfer into and out of bed and chair?: Yes  Dress and groom yourself?: Yes    Bathe or shower yourself?: Yes    Feed yourself?  Yes  Do your laundry/housekeeping?: Yes  Manage your money, pay your bills and track your expenses?: Yes  Make your own meals?: Yes    Do your own shopping?: Yes    Previous Hospitalizations:   Any hospitalizations or ED visits within the last 12 months?: No      Advance Care Planning:   Living will: No    Durable POA for healthcare: No    Advanced directive: No      PREVENTIVE SCREENINGS      Cardiovascular Screening:    General: Screening Not Indicated and History Lipid Disorder      Diabetes Screening:     General: Screening Not Indicated and History Diabetes      Colorectal Cancer Screening:     General: Screening Current      Prostate Cancer Screening:    General: History Prostate Cancer      Abdominal Aortic Aneurysm (AAA) Screening:    Risk factors include: age between 73-67 yo and tobacco use        Lung Cancer Screening:     General: Screening Not Indicated      Hepatitis C Screening:    General: Screening Current    Screening, Brief Intervention, and Referral to Treatment (SBIRT)    Screening  Typical number of drinks in a day: 0  Typical number of drinks in a week: 1  Interpretation: Low risk drinking behavior  AUDIT-C Screenin) How often did you have a drink containing alcohol in the past year? never  2) How many drinks did you have on a typical day when you were drinking in the past year? 0  3) How often did you have 6 or more drinks on one occasion in the past year? never    AUDIT-C Score: 0  Interpretation: Score 0-3 (male): Negative screen for alcohol misuse    Single Item Drug Screening:  How often have you used an illegal drug (including marijuana) or a prescription medication for non-medical reasons in the past year? never    Single Item Drug Screen Score: 0  Interpretation: Negative screen for possible drug use disorder    No exam data present     Physical Exam:     /78 (BP Location: Left arm, Patient Position: Sitting, Cuff Size: Standard)   Pulse (!) 54   Temp 97 7 °F (36 5 °C) (Tympanic)   Resp 14   Ht 6' 1" (1 854 m)   Wt 100 kg (221 lb 3 2 oz)   SpO2 98%   BMI 29 18 kg/m²     Physical Exam  Vitals and nursing note reviewed  Constitutional:       Appearance: He is well-developed  HENT:      Head: Normocephalic and atraumatic  Nose: Nose normal    Eyes:      Conjunctiva/sclera: Conjunctivae normal       Pupils: Pupils are equal, round, and reactive to light  Neck:      Thyroid: Thyromegaly present  Cardiovascular:      Rate and Rhythm: Normal rate and regular rhythm  Heart sounds: Normal heart sounds  Pulmonary:      Effort: Pulmonary effort is normal       Breath sounds: Normal breath sounds  Abdominal:      General: Bowel sounds are normal       Palpations: Abdomen is soft  There is no mass  Tenderness: There is no abdominal tenderness  There is no rebound  Hernia: No hernia is present  Musculoskeletal:         General: No tenderness  Normal range of motion        Cervical back: Normal range of motion and neck supple  Lymphadenopathy:      Cervical: No cervical adenopathy  Skin:     General: Skin is warm and dry  Findings: No rash  Neurological:      Mental Status: He is alert and oriented to person, place, and time  Cranial Nerves: No cranial nerve deficit  Coordination: Coordination normal    Psychiatric:         Behavior: Behavior normal          Thought Content:  Thought content normal          Judgment: Judgment normal           Te Oconnell MD

## 2022-09-29 DIAGNOSIS — Z12.11 COLON CANCER SCREENING: Primary | ICD-10-CM

## 2022-10-04 ENCOUNTER — RA CDI HCC (OUTPATIENT)
Dept: OTHER | Facility: HOSPITAL | Age: 67
End: 2022-10-04

## 2022-10-04 NOTE — PROGRESS NOTES
Previous suggestion used  [facility]  coding opportunities       Chart reviewed, no opportunity found: CHART REVIEWED, NO OPPORTUNITY FOUND        Patients Insurance     Medicare Insurance: [employer]

## 2022-10-12 PROBLEM — N39.0 URINARY TRACT INFECTION WITH HEMATURIA: Status: RESOLVED | Noted: 2021-05-17 | Resolved: 2022-10-12

## 2022-10-12 PROBLEM — R31.9 URINARY TRACT INFECTION WITH HEMATURIA: Status: RESOLVED | Noted: 2021-05-17 | Resolved: 2022-10-12

## 2022-11-22 ENCOUNTER — TELEPHONE (OUTPATIENT)
Dept: FAMILY MEDICINE CLINIC | Facility: CLINIC | Age: 67
End: 2022-11-22

## 2022-11-22 DIAGNOSIS — U07.1 COVID: Primary | ICD-10-CM

## 2022-11-22 RX ORDER — BENZONATATE 200 MG/1
200 CAPSULE ORAL 3 TIMES DAILY PRN
Qty: 90 CAPSULE | Refills: 1 | Status: SHIPPED | OUTPATIENT
Start: 2022-11-22

## 2022-11-22 NOTE — TELEPHONE ENCOUNTER
[facility]  [facility]  Patient is still with headaches, Sore Throat and Cough, 99 9 temp  Patient  Asking is there something else he can do or take      Please review  Thank you

## 2022-12-15 LAB — COLOGUARD RESULT REPORTABLE: NEGATIVE

## 2022-12-16 ENCOUNTER — CLINICAL SUPPORT (OUTPATIENT)
Dept: FAMILY MEDICINE CLINIC | Facility: CLINIC | Age: 67
End: 2022-12-16

## 2022-12-16 DIAGNOSIS — Z23 NEED FOR IMMUNIZATION AGAINST INFLUENZA: Primary | ICD-10-CM

## 2023-01-03 ENCOUNTER — OFFICE VISIT (OUTPATIENT)
Dept: FAMILY MEDICINE CLINIC | Facility: CLINIC | Age: 68
End: 2023-01-03

## 2023-01-03 VITALS
OXYGEN SATURATION: 98 % | DIASTOLIC BLOOD PRESSURE: 78 MMHG | RESPIRATION RATE: 16 BRPM | WEIGHT: 219 LBS | BODY MASS INDEX: 29.03 KG/M2 | HEART RATE: 55 BPM | TEMPERATURE: 97.5 F | SYSTOLIC BLOOD PRESSURE: 126 MMHG | HEIGHT: 73 IN

## 2023-01-03 DIAGNOSIS — Z98.61 S/P PTCA (PERCUTANEOUS TRANSLUMINAL CORONARY ANGIOPLASTY): ICD-10-CM

## 2023-01-03 DIAGNOSIS — M25.561 ACUTE PAIN OF RIGHT KNEE: ICD-10-CM

## 2023-01-03 DIAGNOSIS — R73.03 PREDIABETES: ICD-10-CM

## 2023-01-03 DIAGNOSIS — I26.99 PULMONARY EMBOLISM WITH INFARCTION (HCC): ICD-10-CM

## 2023-01-03 DIAGNOSIS — I10 ESSENTIAL HYPERTENSION: ICD-10-CM

## 2023-01-03 DIAGNOSIS — K65.4 SCLEROSING MESENTERITIS (HCC): ICD-10-CM

## 2023-01-03 DIAGNOSIS — M10.00 ACUTE IDIOPATHIC GOUT, UNSPECIFIED SITE: ICD-10-CM

## 2023-01-03 DIAGNOSIS — Z95.5 STATUS POST PRIMARY ANGIOPLASTY WITH CORONARY STENT: ICD-10-CM

## 2023-01-03 DIAGNOSIS — C61 ADENOCARCINOMA OF PROSTATE (HCC): ICD-10-CM

## 2023-01-03 DIAGNOSIS — E78.2 MIXED HYPERLIPIDEMIA: ICD-10-CM

## 2023-01-03 DIAGNOSIS — I25.10 CORONARY ARTERY DISEASE INVOLVING NATIVE CORONARY ARTERY OF NATIVE HEART WITHOUT ANGINA PECTORIS: ICD-10-CM

## 2023-01-03 DIAGNOSIS — M12.9 ARTHROPATHY: Primary | ICD-10-CM

## 2023-01-03 PROBLEM — N40.0 BPH (BENIGN PROSTATIC HYPERPLASIA): Status: ACTIVE | Noted: 2022-10-19

## 2023-01-03 RX ORDER — SAW/PYGEUM/BETA/HERB/D3/B6/ZN 30 MG-25MG
10 CAPSULE ORAL
COMMUNITY

## 2023-01-03 NOTE — PROGRESS NOTES
Assessment/Plan: This 78-year-old male is a long history of intermittent inflammatory arthropathy of the knees requiring corticosteroid injection for resolution  He takes low doses of tramadol intermittently for mild episodes however this episode is lasted a week and he is favoring his right leg over the painful left knee  His knee is minimally warm and swollen but is painful with range of motion  Patient consented to steroid injection which she has had in the past   After thoroughly cleansing with alcohol patient was injected with 1 cc of 1% Xylocaine and 60 mg of Depo-Medrol without incident  Discomfort was 70% relieved after 10 minutes  Diet reviewed  Lifestyle modifications reviewed  Medications reviewed and ordered  Laboratory tests and studies reviewed and ordered  All patient's questions answered to patient satisfaction  Chief Complaint   Patient presents with   • Knee Pain     Left knee pain          Diagnoses and all orders for this visit:    Arthropathy    Acute pain of right knee    Acute idiopathic gout, unspecified site    S/P PTCA (percutaneous transluminal coronary angioplasty)    Status post primary angioplasty with coronary stent    Prediabetes    Mixed hyperlipidemia    Adenocarcinoma of prostate ([facility] )    Essential hypertension    Coronary artery disease involving native coronary artery of native heart without angina pectoris    Pulmonary embolism with infarction ([facility] )    Sclerosing mesenteritis ([facility] )    Other orders  -     Melatonin ER 10 MG TBCR; Take 10 mg by mouth        Subjective:     Knee Pain   Pertinent negatives include no numbness  Patient ID: Rebecca García is a 69 y o  male          Current Outpatient Medications:   •  Ascorbic Acid (vitamin C) 1000 MG tablet, Take 1,000 mg by mouth daily, Disp: , Rfl:   •  Cholecalciferol 25 MCG (1000 UT) tablet, Take 1,000 Units by mouth daily, Disp: , Rfl:   •  clopidogrel (PLAVIX) 75 mg tablet, TAKE 1 TABLET BY MOUTH EVERY DAY, Disp: 90 tablet, Rfl: 1  •  loratadine (CLARITIN) 10 mg tablet, Take 10 mg by mouth daily , Disp: , Rfl:   •  metoprolol succinate (TOPROL-XL) 25 mg 24 hr tablet, Take 25 mg by mouth every evening , Disp: , Rfl:   •  multivitamin-iron-minerals-folic acid (CENTRUM) chewable tablet, Chew 1 tablet daily, Disp: , Rfl:   •  naloxone (NARCAN) 4 mg/0 1 mL nasal spray, Administer 1 spray into a nostril  If no response after 2-3 minutes, give another dose in the other nostril using a new spray , Disp: 1 each, Rfl: 1  •  omega-3-acid ethyl esters (LOVAZA) 1 g capsule, TAKE 2 CAPSULES (2 G TOTAL) BY MOUTH IN THE MORNING AND 2 CAPSULES (2 G TOTAL) IN THE EVENING , Disp: 360 capsule, Rfl: 4  •  Praluent 150 MG/ML SOAJ, INJECT 1ML UNDER THE SKIN EVERY 14 DAYS, Disp: 15 mL, Rfl: 3  •  tadalafil (CIALIS) 20 MG tablet, Take 1 tablet (20 mg total) by mouth daily as needed for erectile dysfunction, Disp: 10 tablet, Rfl: 3  •  traMADol (ULTRAM) 50 mg tablet, Take 2 tablets (100 mg total) by mouth daily as needed for moderate pain, Disp: 60 tablet, Rfl: 1  •  fluorouracil (EFUDEX) 5 % cream, PLEASE SEE ATTACHED FOR DETAILED DIRECTIONS (Patient not taking: Reported on 5/17/2022), Disp: , Rfl:   •  lisinopril (ZESTRIL) 5 mg tablet, Take 2 5 mg by mouth daily  (Patient not taking: Reported on 12/14/2021), Disp: , Rfl:   •  Melatonin ER 10 MG TBCR, Take 10 mg by mouth, Disp: , Rfl:   •  tamsulosin (FLOMAX) 0 4 mg, Take 1 capsule (0 4 mg total) by mouth in the morning (Patient not taking: Reported on 1/3/2023), Disp: 180 capsule, Rfl: 3    The following portions of the patient's history were reviewed and updated as appropriate: allergies, current medications, past family history, past medical history, past social history, past surgical history and problem list     Review of Systems   Constitutional: Negative for appetite change, fatigue, fever and unexpected weight change     HENT: Negative for rhinorrhea, sinus pressure, sinus pain, sneezing and sore throat  Eyes: Negative for visual disturbance  Respiratory: Negative for cough, chest tightness, shortness of breath and wheezing  Cardiovascular: Negative for chest pain, palpitations and leg swelling  Gastrointestinal: Negative for abdominal distention, abdominal pain, blood in stool, constipation, diarrhea, nausea and vomiting  Endocrine: Negative for polydipsia and polyuria  Genitourinary: Negative for decreased urine volume, difficulty urinating, dysuria, hematuria and urgency  Musculoskeletal: Positive for arthralgias and joint swelling  Negative for back pain and neck pain  Skin: Negative for rash  Allergic/Immunologic: Negative for environmental allergies  Neurological: Negative for tremors, weakness, light-headedness, numbness and headaches  Hematological: Does not bruise/bleed easily  Psychiatric/Behavioral: Negative for agitation, behavioral problems, confusion and dysphoric mood  The patient is not nervous/anxious            Family History   Problem Relation Age of Onset   • Cancer Family         Bladder cancer   • Diabetes Family    • Heart attack Family    • Hypertension Family    • Heart attack Father    • Gout Father    • Cancer Father    • Prostate cancer Father    • Heart disease Mother    • Brain cancer Brother    • Diabetes Brother        Past Medical History:   Diagnosis Date   • Acute ST elevation myocardial infarction (STEMI) involving left anterior descending (LAD) coronary artery ([facility] ) 09/08/2016   • Arthritis     low back and fingers   • BCC (basal cell carcinoma of skin)     left shoulder and nose in past   • Bladder stone    • BPH with urinary obstruction    • Coronary artery disease      2 stents   • Elevated PSA    • Erectile dysfunction    • Gout    • History of colonoscopy 01/19/2018   • History of DVT in adulthood     RLE   • Hypercholesteremia    • Hypertension    • Malignant neoplasm prostate [facility])    • Prediabetes    • Pulmonary embolism ([facility] ) • Right inguinal hernia    • Sclerosing mesenteritis ([facility] )    • Seasonal allergies    • Wears glasses        Past Surgical History:   Procedure Laterality Date   • COLONOSCOPY     • CORONARY STENT PLACEMENT     • CYSTOSCOPY N/A 6/15/2021    Procedure: CYSTOSCOPY;  Surgeon: Parag Perez MD;  [facility];  Service: Urology   • [place] LITHOLAPAXY COMP/LG > 2 5 CM N/A 6/15/2021    Procedure: Linda Silva LASER BLADDER STONE;  Surgeon: Parag Perez MD;  [facility];  Service: Urology   • PROSTATE BIOPSY Bilateral    • TONSILLECTOMY     • UMBILICAL HERNIA REPAIR         Social History     Socioeconomic History   • Marital status: /Civil Union     Spouse name: None   • Number of children: None   • Years of education: None   • Highest education level: None   Occupational History   • Occupation: manager   Tobacco Use   • Smoking status: Former     Types: Cigarettes     Quit date:      Years since quittin 0   • Smokeless tobacco: Never   Vaping Use   • Vaping Use: Never used   Substance and Sexual Activity   • Alcohol use: Yes     Alcohol/week: 1 0 standard drink     Types: 1 Cans of beer per week     Comment: beer   • Drug use: No   • Sexual activity: Yes     Partners: Female     Birth control/protection: None   Other Topics Concern   • None   Social History Narrative   • None     Social Determinants of Health     Financial Resource Strain: Low Risk    • Difficulty of Paying Living Expenses: Not hard at all   Food Insecurity: Not on file   Transportation Needs: No Transportation Needs   • Lack of Transportation (Medical): No   • Lack of Transportation (Non-Medical): No   Physical Activity: Not on file   Stress: Not on file   Social Connections: Not on file   Intimate Partner Violence: Not on file   Housing Stability: Not on file       Allergies   Allergen Reactions   • No Active Allergies        BMI Counseling: Body mass index is 28 89 kg/m²   The BMI is above normal  Nutrition recommendations include decreasing portion sizes, encouraging healthy choices of fruits and vegetables, moderation in carbohydrate intake, increasing intake of lean protein, reducing intake of saturated and trans fat and reducing intake of cholesterol  No pharmacotherapy was ordered  Patient referred to PCP  Rationale for BMI follow-up plan is due to patient being overweight or obese  Objective:    /78 (BP Location: Left arm, Patient Position: Sitting, Cuff Size: Large)   Pulse 55   Temp 97 5 °F (36 4 °C) (Tympanic)   Resp 16   Ht 6' 1" (1 854 m)   Wt 99 3 kg (219 lb)   SpO2 98%   BMI 28 89 kg/m²        Physical Exam  Constitutional:       General: He is not in acute distress  Appearance: He is well-developed  HENT:      Head: Normocephalic and atraumatic  Nose: Nose normal       Mouth/Throat:      Pharynx: No oropharyngeal exudate  Eyes:      General: No scleral icterus  Conjunctiva/sclera: Conjunctivae normal       Pupils: Pupils are equal, round, and reactive to light  Neck:      Thyroid: No thyromegaly  Vascular: No JVD  Trachea: No tracheal deviation  Cardiovascular:      Rate and Rhythm: Normal rate and regular rhythm  Heart sounds: Normal heart sounds  No murmur heard  No friction rub  No gallop  Pulmonary:      Effort: Pulmonary effort is normal  No respiratory distress  Breath sounds: No wheezing or rales  Chest:      Chest wall: No tenderness  Abdominal:      General: Bowel sounds are normal  There is no distension  Palpations: Abdomen is soft  There is no mass  Tenderness: There is no abdominal tenderness  There is no guarding or rebound  Musculoskeletal:         General: Swelling and tenderness present  No deformity  Normal range of motion  Cervical back: Normal range of motion and neck supple  Lymphadenopathy:      Cervical: No cervical adenopathy  Skin:     General: Skin is warm and dry  Findings: No rash  Neurological:      Mental Status: He is alert and oriented to person, place, and time  Cranial Nerves: No cranial nerve deficit  Coordination: Coordination normal    Psychiatric:         Behavior: Behavior normal          Thought Content:  Thought content normal          Judgment: Judgment normal

## 2023-01-03 NOTE — PROGRESS NOTES
The anterolateral l knee was cleansed in a sterile fashion with alcohol  The anterolateral approach was anesthetized with 0 5 cc of 1% xylocaine  With a 25 gauge needle the joint was entered and trace  fluid was removed from the joint and then the patient was injected with a 0 5 cc of 1% xylocaine and 60 mg of Depo-Medrol  70% improvement in the patient's pain was noted within 10 min as expected from the xylocaine  A Band-Aid was applied

## 2023-02-03 DIAGNOSIS — C61 ADENOCARCINOMA OF PROSTATE (HCC): ICD-10-CM

## 2023-02-03 DIAGNOSIS — M10.00 IDIOPATHIC GOUT, UNSPECIFIED CHRONICITY, UNSPECIFIED SITE: ICD-10-CM

## 2023-02-03 DIAGNOSIS — E78.2 MIXED HYPERLIPIDEMIA: ICD-10-CM

## 2023-02-03 DIAGNOSIS — Z98.61 STATUS POST PERCUTANEOUS TRANSLUMINAL CORONARY ANGIOPLASTY: ICD-10-CM

## 2023-02-03 DIAGNOSIS — I25.10 CORONARY ARTERY DISEASE INVOLVING NATIVE CORONARY ARTERY OF NATIVE HEART WITHOUT ANGINA PECTORIS: ICD-10-CM

## 2023-02-03 RX ORDER — ALIROCUMAB 150 MG/ML
INJECTION, SOLUTION SUBCUTANEOUS
Qty: 6 ML | Refills: 2 | Status: SHIPPED | OUTPATIENT
Start: 2023-02-03

## 2023-03-29 ENCOUNTER — APPOINTMENT (OUTPATIENT)
Dept: LAB | Facility: MEDICAL CENTER | Age: 68
End: 2023-03-29

## 2023-03-29 DIAGNOSIS — M10.00 ACUTE IDIOPATHIC GOUT, UNSPECIFIED SITE: ICD-10-CM

## 2023-03-29 DIAGNOSIS — R97.20 ELEVATED PROSTATE SPECIFIC ANTIGEN (PSA): ICD-10-CM

## 2023-03-29 DIAGNOSIS — M12.9 ARTHROPATHY: ICD-10-CM

## 2023-03-29 DIAGNOSIS — R73.03 PREDIABETES: ICD-10-CM

## 2023-03-29 DIAGNOSIS — E78.2 MIXED HYPERLIPIDEMIA: ICD-10-CM

## 2023-03-29 LAB
ALBUMIN SERPL BCP-MCNC: 4 G/DL (ref 3.5–5)
ALP SERPL-CCNC: 50 U/L (ref 46–116)
ALT SERPL W P-5'-P-CCNC: 26 U/L (ref 12–78)
ANION GAP SERPL CALCULATED.3IONS-SCNC: 2 MMOL/L (ref 4–13)
AST SERPL W P-5'-P-CCNC: 17 U/L (ref 5–45)
BASOPHILS # BLD AUTO: 0.08 THOUSANDS/ÂΜL (ref 0–0.1)
BASOPHILS NFR BLD AUTO: 1 % (ref 0–1)
BILIRUB SERPL-MCNC: 0.49 MG/DL (ref 0.2–1)
BUN SERPL-MCNC: 15 MG/DL (ref 5–25)
CALCIUM SERPL-MCNC: 9.7 MG/DL (ref 8.3–10.1)
CHLORIDE SERPL-SCNC: 111 MMOL/L (ref 96–108)
CO2 SERPL-SCNC: 27 MMOL/L (ref 21–32)
CREAT SERPL-MCNC: 0.92 MG/DL (ref 0.6–1.3)
EOSINOPHIL # BLD AUTO: 0.18 THOUSAND/ÂΜL (ref 0–0.61)
EOSINOPHIL NFR BLD AUTO: 2 % (ref 0–6)
ERYTHROCYTE [DISTWIDTH] IN BLOOD BY AUTOMATED COUNT: 13.2 % (ref 11.6–15.1)
GFR SERPL CREATININE-BSD FRML MDRD: 84 ML/MIN/1.73SQ M
GLUCOSE P FAST SERPL-MCNC: 114 MG/DL (ref 65–99)
HCT VFR BLD AUTO: 46.2 % (ref 36.5–49.3)
HGB BLD-MCNC: 15.2 G/DL (ref 12–17)
IMM GRANULOCYTES # BLD AUTO: 0.03 THOUSAND/UL (ref 0–0.2)
IMM GRANULOCYTES NFR BLD AUTO: 0 % (ref 0–2)
LDLC SERPL DIRECT ASSAY-MCNC: 76 MG/DL (ref 0–100)
LYMPHOCYTES # BLD AUTO: 2.69 THOUSANDS/ÂΜL (ref 0.6–4.47)
LYMPHOCYTES NFR BLD AUTO: 35 % (ref 14–44)
MCH RBC QN AUTO: 30.9 PG (ref 26.8–34.3)
MCHC RBC AUTO-ENTMCNC: 32.9 G/DL (ref 31.4–37.4)
MCV RBC AUTO: 94 FL (ref 82–98)
MONOCYTES # BLD AUTO: 0.7 THOUSAND/ÂΜL (ref 0.17–1.22)
MONOCYTES NFR BLD AUTO: 9 % (ref 4–12)
NEUTROPHILS # BLD AUTO: 3.98 THOUSANDS/ÂΜL (ref 1.85–7.62)
NEUTS SEG NFR BLD AUTO: 53 % (ref 43–75)
NRBC BLD AUTO-RTO: 0 /100 WBCS
PLATELET # BLD AUTO: 310 THOUSANDS/UL (ref 149–390)
PMV BLD AUTO: 10.4 FL (ref 8.9–12.7)
POTASSIUM SERPL-SCNC: 4.5 MMOL/L (ref 3.5–5.3)
PROT SERPL-MCNC: 7.2 G/DL (ref 6.4–8.4)
RBC # BLD AUTO: 4.92 MILLION/UL (ref 3.88–5.62)
SODIUM SERPL-SCNC: 140 MMOL/L (ref 135–147)
TRIGL SERPL-MCNC: 251 MG/DL
URATE SERPL-MCNC: 6.7 MG/DL (ref 3.5–8.5)
WBC # BLD AUTO: 7.66 THOUSAND/UL (ref 4.31–10.16)

## 2023-03-30 LAB
EST. AVERAGE GLUCOSE BLD GHB EST-MCNC: 117 MG/DL
HBA1C MFR BLD: 5.7 %
PSA FREE MFR SERPL: 21.2 %
PSA FREE SERPL-MCNC: 0.53 NG/ML
PSA SERPL-MCNC: 2.5 NG/ML (ref 0–4)

## 2023-04-03 ENCOUNTER — RA CDI HCC (OUTPATIENT)
Dept: OTHER | Facility: HOSPITAL | Age: 68
End: 2023-04-03

## 2023-04-03 NOTE — PROGRESS NOTES
[facility]  coding opportunities       Chart reviewed, no opportunity found: CHART REVIEWED, NO OPPORTUNITY FOUND        Patients Insurance     Medicare Insurance: Medicare

## 2023-05-15 ENCOUNTER — RA CDI HCC (OUTPATIENT)
Dept: OTHER | Facility: HOSPITAL | Age: 68
End: 2023-05-15

## 2023-05-22 PROBLEM — M19.90 CHRONIC INFLAMMATORY ARTHRITIS: Status: ACTIVE | Noted: 2023-05-22

## 2023-05-23 ENCOUNTER — OFFICE VISIT (OUTPATIENT)
Dept: FAMILY MEDICINE CLINIC | Facility: CLINIC | Age: 68
End: 2023-05-23

## 2023-05-23 VITALS
RESPIRATION RATE: 16 BRPM | WEIGHT: 225 LBS | DIASTOLIC BLOOD PRESSURE: 78 MMHG | TEMPERATURE: 97.4 F | SYSTOLIC BLOOD PRESSURE: 122 MMHG | OXYGEN SATURATION: 98 % | HEART RATE: 63 BPM | BODY MASS INDEX: 28.88 KG/M2 | HEIGHT: 74 IN

## 2023-05-23 DIAGNOSIS — C61 ADENOCARCINOMA OF PROSTATE (HCC): ICD-10-CM

## 2023-05-23 DIAGNOSIS — Z78.9 STATIN INTOLERANCE: ICD-10-CM

## 2023-05-23 DIAGNOSIS — R73.03 PREDIABETES: ICD-10-CM

## 2023-05-23 DIAGNOSIS — Z95.5 STATUS POST PRIMARY ANGIOPLASTY WITH CORONARY STENT: ICD-10-CM

## 2023-05-23 DIAGNOSIS — E78.2 MIXED HYPERLIPIDEMIA: ICD-10-CM

## 2023-05-23 DIAGNOSIS — M19.90 CHRONIC INFLAMMATORY ARTHRITIS: ICD-10-CM

## 2023-05-23 DIAGNOSIS — G89.29 CHRONIC PAIN OF LEFT KNEE: Primary | ICD-10-CM

## 2023-05-23 DIAGNOSIS — I25.10 CORONARY ARTERY DISEASE INVOLVING NATIVE CORONARY ARTERY OF NATIVE HEART WITHOUT ANGINA PECTORIS: ICD-10-CM

## 2023-05-23 DIAGNOSIS — Z98.61 STATUS POST PERCUTANEOUS TRANSLUMINAL CORONARY ANGIOPLASTY: ICD-10-CM

## 2023-05-23 DIAGNOSIS — I10 ESSENTIAL HYPERTENSION: ICD-10-CM

## 2023-05-23 DIAGNOSIS — K65.4 SCLEROSING MESENTERITIS (HCC): ICD-10-CM

## 2023-05-23 DIAGNOSIS — M25.562 CHRONIC PAIN OF LEFT KNEE: Primary | ICD-10-CM

## 2023-05-23 DIAGNOSIS — M19.042 PRIMARY OSTEOARTHRITIS OF BOTH HANDS: ICD-10-CM

## 2023-05-23 DIAGNOSIS — Z86.711 HX PULMONARY EMBOLISM: ICD-10-CM

## 2023-05-23 DIAGNOSIS — M19.041 PRIMARY OSTEOARTHRITIS OF BOTH HANDS: ICD-10-CM

## 2023-05-23 NOTE — PROGRESS NOTES
Assessment/Plan: This 52-year-old male is noticed that his left knee is starting to bother him more and is more painful  Occasionally he notes that when it is bothering him more that it gives  Strengthening exercises were prescribed  Its been 3 months plus since his last steroid injection and he will was given 40 mg of Depo-Medrol and 1 cc of Xylocaine in the left knee after thorough cleansing with alcohol without incident  There is more than 70% improvement in the discomfort in the knee after the injection at 10 minutes  The patient has chronic inflammatory arthritis with inflammatory joint fluid aspiration in the remote past   He also has evidence of osteoarthritis especially in his hands  He has been evaluated by rheumatology in the past but not recently  He is considering possible return to rheumatology but for now would like to have his left knee injected periodically with corticosteroids every 3 to 6 months as needed  He also has extra strength Tylenol that he can take as well as occasional tramadol as needed  He is back to taking fish oil thousand twice a day and will increase to 2000 twice a day  He is going to focus on getting back to a carb restricted diet  Diet reviewed  Lifestyle modifications reviewed  Medications reviewed and ordered  Laboratory tests and studies reviewed and ordered  All patient's questions answered to patient satisfaction  Chief Complaint   Patient presents with   • Follow-up     Follow up left knee pain, Injection needed  Diagnoses and all orders for this visit:    Chronic pain of left knee    Chronic inflammatory arthritis  -     CBC and differential; Future  -     CBC and differential; Future  -     Comprehensive metabolic panel; Future  -     LDL cholesterol, direct; Future  -     Lipid panel; Future  -     PSA Total, Diagnostic;  Future    Primary osteoarthritis of both hands    Coronary artery disease involving native coronary artery of native heart without angina pectoris  -     CBC and differential; Future  -     CBC and differential; Future  -     Comprehensive metabolic panel; Future  -     LDL cholesterol, direct; Future  -     Lipid panel; Future  -     PSA Total, Diagnostic; Future    Status post percutaneous transluminal coronary angioplasty    Status post primary angioplasty with coronary stent    Statin intolerance    Mixed hyperlipidemia  -     CBC and differential; Future  -     CBC and differential; Future  -     Comprehensive metabolic panel; Future  -     LDL cholesterol, direct; Future  -     Lipid panel; Future  -     PSA Total, Diagnostic; Future    Hx pulmonary embolism    Prediabetes    Adenocarcinoma of prostate [facility])    Essential hypertension    Sclerosing mesenteritis (HCC)      Subjective: This 64-year-old male is noticed that his left knee is starting to bother him more and is more painful  Occasionally he notes that when it is bothering him more that it gives  Strengthening exercises were prescribed  Its been 3 months plus since his last steroid injection and he will was given 40 mg of Depo-Medrol and 1 cc of Xylocaine in the left knee after thorough cleansing with alcohol without incident  There is more than 70% improvement in the discomfort in the knee after the injection at 10 minutes  The patient has chronic inflammatory arthritis with inflammatory joint fluid aspiration in the remote past   He also has evidence of osteoarthritis especially in his hands  He has been evaluated by rheumatology in the past but not recently  He is considering possible return to rheumatology but for now would like to have his left knee injected periodically with corticosteroids every 3 to 6 months as needed  He also has extra strength Tylenol that he can take as well as occasional tramadol as needed  He is back to taking fish oil thousand twice a day and will increase to 2000 twice a day    He is going to focus on getting back to a carb restricted diet  Patient ID: Yarelis Stallings is a 77 y o  male  Current Outpatient Medications:   •  Ascorbic Acid (vitamin C) 1000 MG tablet, Take 1,000 mg by mouth daily, Disp: , Rfl:   •  Cholecalciferol 25 MCG (1000 UT) tablet, Take 1,000 Units by mouth daily, Disp: , Rfl:   •  clopidogrel (PLAVIX) 75 mg tablet, TAKE 1 TABLET BY MOUTH EVERY DAY, Disp: 90 tablet, Rfl: 1  •  loratadine (CLARITIN) 10 mg tablet, Take 10 mg by mouth daily , Disp: , Rfl:   •  Melatonin ER 10 MG TBCR, Take 10 mg by mouth, Disp: , Rfl:   •  metoprolol succinate (TOPROL-XL) 25 mg 24 hr tablet, Take 25 mg by mouth every evening , Disp: , Rfl:   •  multivitamin-iron-minerals-folic acid (CENTRUM) chewable tablet, Chew 1 tablet daily, Disp: , Rfl:   •  naloxone (NARCAN) 4 mg/0 1 mL nasal spray, Administer 1 spray into a nostril   If no response after 2-3 minutes, give another dose in the other nostril using a new spray , Disp: 1 each, Rfl: 1  •  omega-3-acid ethyl esters (LOVAZA) 1 g capsule, TAKE 2 CAPSULES (2 G TOTAL) BY MOUTH IN THE MORNING AND 2 CAPSULES (2 G TOTAL) IN THE EVENING , Disp: 360 capsule, Rfl: 4  •  Praluent 150 MG/ML SOAJ, INJECT 1ML UNDER THE SKIN EVERY 14 DAYS, Disp: 6 mL, Rfl: 2  •  tadalafil (CIALIS) 20 MG tablet, Take 1 tablet (20 mg total) by mouth daily as needed for erectile dysfunction, Disp: 10 tablet, Rfl: 3  •  traMADol (ULTRAM) 50 mg tablet, Take 2 tablets (100 mg total) by mouth daily as needed for moderate pain, Disp: 60 tablet, Rfl: 1  •  fluorouracil (EFUDEX) 5 % cream, PLEASE SEE ATTACHED FOR DETAILED DIRECTIONS (Patient not taking: Reported on 5/17/2022), Disp: , Rfl:     The following portions of the patient's history were reviewed and updated as appropriate: allergies, current medications, past family history, past medical history, past social history, past surgical history and problem list     Review of Systems   Constitutional: Negative for appetite change, fatigue, fever and unexpected weight change  HENT: Negative for rhinorrhea, sinus pressure, sinus pain, sneezing and sore throat  Eyes: Negative for visual disturbance  Respiratory: Negative for cough, chest tightness, shortness of breath and wheezing  Cardiovascular: Negative for chest pain, palpitations and leg swelling  Gastrointestinal: Negative for abdominal distention, abdominal pain, blood in stool, constipation, diarrhea, nausea and vomiting  Endocrine: Negative for polydipsia and polyuria  Genitourinary: Negative for decreased urine volume, difficulty urinating, dysuria, hematuria and urgency  Musculoskeletal: Positive for arthralgias and back pain  Negative for joint swelling and neck pain  Skin: Negative for rash  Allergic/Immunologic: Negative for environmental allergies  Neurological: Negative for tremors, weakness, light-headedness, numbness and headaches  Hematological: Does not bruise/bleed easily  Psychiatric/Behavioral: Negative for agitation, behavioral problems, confusion and dysphoric mood  The patient is not nervous/anxious            Family History   Problem Relation Age of Onset   • Cancer Family         Bladder cancer   • Diabetes Family    • Heart attack Family    • Hypertension Family    • Heart attack Father    • Gout Father    • Cancer Father    • Prostate cancer Father    • Heart disease Mother    • Brain cancer Brother    • Diabetes Brother        Past Medical History:   Diagnosis Date   • Acute ST elevation myocardial infarction (STEMI) involving left anterior descending (LAD) [facility] ) 09/08/2016   • Arthritis     low back and fingers   • BCC (basal cell carcinoma of skin)     left shoulder and nose in past   • Bladder stone    • BPH with urinary obstruction    • Coronary artery disease      2 stents   • Elevated PSA    • Erectile dysfunction    • Gout    • History of colonoscopy 01/19/2018   • History of DVT in adulthood     RLE   • Hypercholesteremia    • Hypertension    • Malignant neoplasm prostate ([facility] )    • Prediabetes    • Pulmonary embolism (HCC)    • Right inguinal hernia    • [facility] )    • Seasonal allergies    • Wears glasses        Past Surgical History:   Procedure Laterality Date   • COLONOSCOPY     • CORONARY STENT PLACEMENT     • CYSTOSCOPY N/A 6/15/2021    Procedure: CYSTOSCOPY;  Surgeon: Ada Dias MD;  [facility];  Service: Urology   • [place] LITHOLAPAXY COMP/LG > 2 5 CM N/A 6/15/2021    Procedure: Odalys Lewis LASER BLADDER STONE;  Surgeon: Ada Dias MD;  [facility];  Service: Urology   • PROSTATE BIOPSY Bilateral    • TONSILLECTOMY     • UMBILICAL HERNIA REPAIR         Social History     Socioeconomic History   • Marital status: /Civil Union     Spouse name: None   • Number of children: None   • Years of education: None   • Highest education level: None   Occupational History   • Occupation: manager   Tobacco Use   • Smoking status: Former     Types: Cigarettes     Quit date:      Years since quittin 4   • Smokeless tobacco: Never   Vaping Use   • Vaping Use: Never used   Substance and Sexual Activity   • Alcohol use: Yes     Alcohol/week: 1 0 standard drink     Types: 1 Cans of beer per week     Comment: beer   • Drug use: No   • Sexual activity: Yes     Partners: Female     Birth control/protection: None   Other Topics Concern   • None   Social History Narrative   • None     Social Determinants of Health     Financial Resource Strain: Low Risk    • Difficulty of Paying Living Expenses: Not hard at all   Food Insecurity: Not on file   Transportation Needs: No Transportation Needs   • Lack of Transportation (Medical): No   • Lack of Transportation (Non-Medical):  No   Physical Activity: Not on file   Stress: Not on file   Social Connections: Not on file   Intimate Partner Violence: Not on file   Housing Stability: Not on file       Allergies   Allergen Reactions   • No Active Allergies "    Objective:    /78 (BP Location: Left arm, Patient Position: Sitting, Cuff Size: Standard)   Pulse 63   Temp (!) 97 4 °F (36 3 °C) (Tympanic)   Resp 16   Ht 6' 2\" (1 88 m)   Wt 102 kg (225 lb)   SpO2 98%   BMI 28 89 kg/m²        Physical Exam  Constitutional:       General: He is not in acute distress  Appearance: He is well-developed  HENT:      Head: Normocephalic and atraumatic  Nose: Nose normal       Mouth/Throat:      Pharynx: No oropharyngeal exudate  Eyes:      General: No scleral icterus  Conjunctiva/sclera: Conjunctivae normal       Pupils: Pupils are equal, round, and reactive to light  Neck:      Thyroid: No thyromegaly  Vascular: No JVD  Trachea: No tracheal deviation  Cardiovascular:      Rate and Rhythm: Normal rate and regular rhythm  Heart sounds: Normal heart sounds  No murmur heard  No friction rub  No gallop  Pulmonary:      Effort: Pulmonary effort is normal  No respiratory distress  Breath sounds: No wheezing or rales  Chest:      Chest wall: No tenderness  Abdominal:      General: Bowel sounds are normal  There is no distension  Palpations: Abdomen is soft  There is no mass  Tenderness: There is no abdominal tenderness  There is no guarding or rebound  Musculoskeletal:         General: No deformity  Normal range of motion  Cervical back: Normal range of motion and neck supple  Lymphadenopathy:      Cervical: No cervical adenopathy  Skin:     General: Skin is warm and dry  Findings: No rash  Neurological:      Mental Status: He is alert and oriented to person, place, and time  Cranial Nerves: No cranial nerve deficit  Coordination: Coordination normal    Psychiatric:         Behavior: Behavior normal          Thought Content:  Thought content normal          Judgment: Judgment normal          "

## 2023-05-23 NOTE — PROGRESS NOTES
The anterolateral left knee was cleansed in a sterile fashion with alcohol  The anterolateral approach was anesthetized with 0 5 cc of 1% xylocaine  With a 25 gauge needle the joint was entered and no fluid was removed from the joint and then the patient was injected with a 1 cc of 1% xylocaine and 40 mg of Depo-Medrol  70% improvement in the patient's pain was noted within 10 min as expected from the xylocaine  A Band-Aid was applied

## 2023-11-28 ENCOUNTER — RA CDI HCC (OUTPATIENT)
Dept: OTHER | Facility: HOSPITAL | Age: 68
End: 2023-11-28

## 2023-12-10 ENCOUNTER — LAB (OUTPATIENT)
Dept: LAB | Facility: HOSPITAL | Age: 68
End: 2023-12-10
Payer: MEDICARE

## 2023-12-10 DIAGNOSIS — I25.10 CORONARY ARTERY DISEASE INVOLVING NATIVE CORONARY ARTERY OF NATIVE HEART WITHOUT ANGINA PECTORIS: ICD-10-CM

## 2023-12-10 DIAGNOSIS — M19.90 CHRONIC INFLAMMATORY ARTHRITIS: ICD-10-CM

## 2023-12-10 DIAGNOSIS — E78.2 MIXED HYPERLIPIDEMIA: ICD-10-CM

## 2023-12-10 LAB
ALBUMIN SERPL BCP-MCNC: 4.5 G/DL (ref 3.5–5)
ALP SERPL-CCNC: 49 U/L (ref 34–104)
ALT SERPL W P-5'-P-CCNC: 15 U/L (ref 7–52)
ANION GAP SERPL CALCULATED.3IONS-SCNC: 5 MMOL/L
AST SERPL W P-5'-P-CCNC: 18 U/L (ref 13–39)
BASOPHILS # BLD AUTO: 0.06 THOUSANDS/ÂΜL (ref 0–0.1)
BASOPHILS NFR BLD AUTO: 1 % (ref 0–1)
BILIRUB SERPL-MCNC: 0.71 MG/DL (ref 0.2–1)
BUN SERPL-MCNC: 14 MG/DL (ref 5–25)
CALCIUM SERPL-MCNC: 9.7 MG/DL (ref 8.4–10.2)
CHLORIDE SERPL-SCNC: 107 MMOL/L (ref 96–108)
CHOLEST SERPL-MCNC: 147 MG/DL
CO2 SERPL-SCNC: 28 MMOL/L (ref 21–32)
CREAT SERPL-MCNC: 0.99 MG/DL (ref 0.6–1.3)
EOSINOPHIL # BLD AUTO: 0.15 THOUSAND/ÂΜL (ref 0–0.61)
EOSINOPHIL NFR BLD AUTO: 2 % (ref 0–6)
ERYTHROCYTE [DISTWIDTH] IN BLOOD BY AUTOMATED COUNT: 12.7 % (ref 11.6–15.1)
GFR SERPL CREATININE-BSD FRML MDRD: 76 ML/MIN/1.73SQ M
GLUCOSE P FAST SERPL-MCNC: 104 MG/DL (ref 65–99)
HCT VFR BLD AUTO: 46.2 % (ref 36.5–49.3)
HDLC SERPL-MCNC: 51 MG/DL
HGB BLD-MCNC: 15.1 G/DL (ref 12–17)
IMM GRANULOCYTES # BLD AUTO: 0.02 THOUSAND/UL (ref 0–0.2)
IMM GRANULOCYTES NFR BLD AUTO: 0 % (ref 0–2)
LDLC SERPL CALC-MCNC: 70 MG/DL (ref 0–100)
LDLC SERPL DIRECT ASSAY-MCNC: 81 MG/DL (ref 0–100)
LYMPHOCYTES # BLD AUTO: 2.83 THOUSANDS/ÂΜL (ref 0.6–4.47)
LYMPHOCYTES NFR BLD AUTO: 34 % (ref 14–44)
MCH RBC QN AUTO: 30.6 PG (ref 26.8–34.3)
MCHC RBC AUTO-ENTMCNC: 32.7 G/DL (ref 31.4–37.4)
MCV RBC AUTO: 94 FL (ref 82–98)
MONOCYTES # BLD AUTO: 0.91 THOUSAND/ÂΜL (ref 0.17–1.22)
MONOCYTES NFR BLD AUTO: 11 % (ref 4–12)
NEUTROPHILS # BLD AUTO: 4.46 THOUSANDS/ÂΜL (ref 1.85–7.62)
NEUTS SEG NFR BLD AUTO: 52 % (ref 43–75)
NONHDLC SERPL-MCNC: 96 MG/DL
NRBC BLD AUTO-RTO: 0 /100 WBCS
PLATELET # BLD AUTO: 313 THOUSANDS/UL (ref 149–390)
PMV BLD AUTO: 10.6 FL (ref 8.9–12.7)
POTASSIUM SERPL-SCNC: 4.6 MMOL/L (ref 3.5–5.3)
PROT SERPL-MCNC: 7.5 G/DL (ref 6.4–8.4)
PSA SERPL-MCNC: 3.58 NG/ML (ref 0–4)
RBC # BLD AUTO: 4.94 MILLION/UL (ref 3.88–5.62)
SODIUM SERPL-SCNC: 140 MMOL/L (ref 135–147)
TRIGL SERPL-MCNC: 129 MG/DL
WBC # BLD AUTO: 8.43 THOUSAND/UL (ref 4.31–10.16)

## 2023-12-10 PROCEDURE — 36415 COLL VENOUS BLD VENIPUNCTURE: CPT

## 2023-12-10 PROCEDURE — 83721 ASSAY OF BLOOD LIPOPROTEIN: CPT

## 2023-12-10 PROCEDURE — 80061 LIPID PANEL: CPT

## 2023-12-10 PROCEDURE — 85025 COMPLETE CBC W/AUTO DIFF WBC: CPT

## 2023-12-10 PROCEDURE — 80053 COMPREHEN METABOLIC PANEL: CPT

## 2023-12-10 PROCEDURE — 84153 ASSAY OF PSA TOTAL: CPT

## 2023-12-12 ENCOUNTER — OFFICE VISIT (OUTPATIENT)
Dept: FAMILY MEDICINE CLINIC | Facility: CLINIC | Age: 68
End: 2023-12-12
Payer: MEDICARE

## 2023-12-12 VITALS
TEMPERATURE: 97.3 F | HEIGHT: 74 IN | RESPIRATION RATE: 18 BRPM | HEART RATE: 52 BPM | DIASTOLIC BLOOD PRESSURE: 80 MMHG | SYSTOLIC BLOOD PRESSURE: 118 MMHG | BODY MASS INDEX: 28.49 KG/M2 | OXYGEN SATURATION: 97 % | WEIGHT: 222 LBS

## 2023-12-12 DIAGNOSIS — J30.1 ALLERGIC RHINITIS DUE TO POLLEN, UNSPECIFIED SEASONALITY: ICD-10-CM

## 2023-12-12 DIAGNOSIS — Z00.00 MEDICARE ANNUAL WELLNESS VISIT, SUBSEQUENT: ICD-10-CM

## 2023-12-12 DIAGNOSIS — Z23 ENCOUNTER FOR IMMUNIZATION: ICD-10-CM

## 2023-12-12 DIAGNOSIS — M17.0 PRIMARY OSTEOARTHRITIS OF BOTH KNEES: Primary | ICD-10-CM

## 2023-12-12 PROCEDURE — G0008 ADMIN INFLUENZA VIRUS VAC: HCPCS

## 2023-12-12 PROCEDURE — 99214 OFFICE O/P EST MOD 30 MIN: CPT | Performed by: FAMILY MEDICINE

## 2023-12-12 PROCEDURE — G0009 ADMIN PNEUMOCOCCAL VACCINE: HCPCS

## 2023-12-12 PROCEDURE — 90662 IIV NO PRSV INCREASED AG IM: CPT

## 2023-12-12 PROCEDURE — 90471 IMMUNIZATION ADMIN: CPT

## 2023-12-12 PROCEDURE — G0438 PPPS, INITIAL VISIT: HCPCS | Performed by: FAMILY MEDICINE

## 2023-12-12 PROCEDURE — 90677 PCV20 VACCINE IM: CPT

## 2023-12-12 RX ORDER — FLUTICASONE PROPIONATE 50 MCG
1 SPRAY, SUSPENSION (ML) NASAL DAILY
Qty: 15.8 ML | Refills: 1 | Status: SHIPPED | OUTPATIENT
Start: 2023-12-12

## 2023-12-12 NOTE — PROGRESS NOTES
Answers submitted by the patient for this visit:  Medicare Annual Wellness Visit (Submitted on 12/12/2023)  How would you rate your overall health?: excellent  Compared to last year, how is your physical health?: slightly better  In general, how satisfied are you with your life?: very satisfied  Compared to last year, how is your eyesight?: same  Compared to last year, how is your hearing?: slightly worse  Compared to last year, how is your emotional/mental health?: same  How often is anger a problem for you?: never, rarely  How often do you feel unusually tired/fatigued?: never, rarely  In the past 7 days, how much pain have you experienced?: none  In the past 6 months, have you lost or gained 10 pounds without trying?: No  One or more falls in the last year: No  Do you have trouble with the stairs inside or outside your home?: No  Does your home have working smoke alarms?: Yes  Does your home have a carbon monoxide monitor?: No  Which safety hazards (if any) have you experienced in your home? Please select all that apply.: none  How would you describe your current diet? Please select all that apply.: Regular, Low Carb, Limited junk food  In addition to prescription medications, are you taking any over-the-counter supplements?: Yes  If yes, what supplements are you taking?: Fish Oil, Vitamin D, Vitamin C,  Can you manage your medications?: Yes  Are you currently taking any opioid medications?: No  Can you walk and transfer into and out of your bed and chair?: Yes  Can you dress and groom yourself?: Yes  Can you bathe or shower yourself?: Yes  Can you feed yourself?: Yes  Can you do your laundry/ housekeeping?: Yes  Can you manage your money, pay your bills, and track your expenses?: Yes  Can you make your own meals?: Yes  Can you do your own shopping?: Yes  Within the last 12 months, have you had any hospitalizations or Emergency Department visits?: No  Do you have a living will?: No  Do you have a Durable POA  (Power of ) for healthcare decisions?: No  Do you have an Advanced Directive for end of life decisions?: No  How often have you used an illegal drug (including marijuana) or a prescription medication for non-medical reasons in the past year?: never  What is the typical number of drinks you consume in a day?: 0  What is the typical number of drinks you consume in a week?: 0  How often did you have a drink containing alcohol in the past year?: monthly or less  How many drinks did you have on a typical day  when you were drinking in the past year?: 0  How often did you have 6 or more drinks on one occasion in the past year?: never   Assessment and Plan:     Problem List Items Addressed This Visit          Musculoskeletal and Integument    Primary osteoarthritis of both knees - Primary    Relevant Orders    Ambulatory Referral to Orthopedic Surgery       Other    Medicare annual wellness visit, subsequent     Other Visit Diagnoses       Allergic rhinitis due to pollen, unspecified seasonality        Relevant Medications    fluticasone (FLONASE) 50 mcg/act nasal spray    Encounter for immunization        Relevant Orders    influenza vaccine, high-dose, PF 0.7 mL (FLUZONE HIGH-DOSE)    Pneumococcal Conjugate Vaccine 20-valent (Pcv20)          67 y/o male with: allergic rhinitis and knee OA along with Medicare AWV. Will continue meds. Will refer to Ortho. Discussed supportive care and return parameters.     Regarding Medicare Annual well visit. Discussed various safety and health maintenance issues including healthy diet like the Mediterranean diet, exercise, ample sleep, stress reduction, and healthy weight as tolerated. Discussed supportive care and return parameters.     BMI Counseling: Body mass index is 28.5 kg/m². The BMI is above normal. Nutrition recommendations include encouraging healthy choices of fruits and vegetables. Exercise recommendations include moderate physical activity 150 minutes/week.  Rationale for BMI follow-up plan is due to patient being overweight or obese.     Depression Screening and Follow-up Plan: Patient was screened for depression during today's encounter. They screened negative with a PHQ-2 score of 0.      Preventive health issues were discussed with patient, and age appropriate screening tests were ordered as noted in patient's After Visit Summary.  Personalized health advice and appropriate referrals for health education or preventive services given if needed, as noted in patient's After Visit Summary.     History of Present Illness:     Patient presents for a Medicare Wellness Visit    Patient is a 69 y/o male who presents for follow-up on allergic rhinitis and knee OA. No fevers chills nausea or vomiting. Tolerating PO intake. Pt also here for Medicare AWV admits being active eats and sleeps well.       Patient Care Team:  Kenan Garcia MD as PCP - General (Family Medicine)  MD Romario Smith MD     Review of Systems:     Review of Systems   Constitutional: Negative.    HENT: Negative.     Eyes: Negative.    Respiratory: Negative.     Cardiovascular: Negative.    Gastrointestinal: Negative.    Endocrine: Negative.    Genitourinary: Negative.    Musculoskeletal:  Positive for arthralgias.   Allergic/Immunologic: Negative.    Neurological: Negative.    Hematological: Negative.    Psychiatric/Behavioral: Negative.     All other systems reviewed and are negative.       Problem List:     Patient Active Problem List   Diagnosis    Malignant neoplasm of prostate (HCC)    Abnormal glucose level    Primary osteoarthritis of both knees    Adenocarcinoma of prostate (HCC)    Arthropathy    Cardiomyopathy, ischemic    Chest pain    Chronic bilateral low back pain without sciatica    Essential hypertension    Hx pulmonary embolism    Hyperlipidemia    Primary osteoarthritis of both hands    Pulmonary embolism with infarction (HCC)    Sclerosing mesenteritis  (HCC)    STEMI (ST elevation myocardial infarction) (HCC)    Toxic myopathy    Coronary artery disease involving native coronary artery of native heart without angina pectoris    Other male erectile dysfunction    BPH with urinary obstruction    Status post primary angioplasty with coronary stent    Urgency of urination    Bladder stone    HLD (hyperlipidemia)    Statin intolerance    Elevated PSA    Benign prostatic hyperplasia    History of bladder stone    Prediabetes    Opioid use disorder, mild, in sustained remission, on maintenance therapy (HCC)    BPH (benign prostatic hyperplasia)    Chronic inflammatory arthritis    Medicare annual wellness visit, subsequent      Past Medical and Surgical History:     Past Medical History:   Diagnosis Date    Acute ST elevation myocardial infarction (STEMI) involving left anterior descending (LAD) coronary artery (HCC) 09/08/2016    Arthritis     low back and fingers    BCC (basal cell carcinoma of skin)     left shoulder and nose in past    Bladder stone     BPH with urinary obstruction     Coronary artery disease      2 stents    Elevated PSA     Erectile dysfunction     Gout     History of colonoscopy 01/19/2018    History of DVT in adulthood     RLE    Hypercholesteremia     Hypertension     Malignant neoplasm prostate (HCC)     Prediabetes     Pulmonary embolism (HCC)     Right inguinal hernia     Sclerosing mesenteritis (HCC)     Seasonal allergies     Wears glasses      Past Surgical History:   Procedure Laterality Date    COLONOSCOPY      CORONARY STENT PLACEMENT      CYSTOSCOPY N/A 6/15/2021    Procedure: CYSTOSCOPY;  Surgeon: Dequan Barrientos MD;  Location: [facility];  Service: Urology    IL LITHOLAPAXY COMP/LG > 2.5 CM N/A 6/15/2021    Procedure: LITHOLOPAXY HOLMIUM LASER BLADDER STONE;  Surgeon: Dequan Barrientos MD;  Location: [facility];  Service: Urology    PROSTATE BIOPSY Bilateral 2011    TONSILLECTOMY      UMBILICAL HERNIA REPAIR        Family History:      Family History   Problem Relation Age of Onset    Cancer Family         Bladder cancer    Diabetes Family     Heart attack Family     Hypertension Family     Heart attack Father     Gout Father     Cancer Father     Prostate cancer Father     Heart disease Mother     Brain cancer Brother     Diabetes Brother       Social History:     Social History     Socioeconomic History    Marital status: /Civil Union     Spouse name: None    Number of children: None    Years of education: None    Highest education level: None   Occupational History    Occupation: manager   Tobacco Use    Smoking status: Former     Current packs/day: 0.00     Types: Cigarettes     Quit date:      Years since quittin.9    Smokeless tobacco: Never   Vaping Use    Vaping status: Never Used   Substance and Sexual Activity    Alcohol use: Yes     Alcohol/week: 1.0 standard drink of alcohol     Types: 1 Cans of beer per week     Comment: beer    Drug use: No    Sexual activity: Yes     Partners: Female     Birth control/protection: None   Other Topics Concern    None   Social History Narrative    None     Social Determinants of Health     Financial Resource Strain: Low Risk  (2023)    Overall Financial Resource Strain (CARDIA)     Difficulty of Paying Living Expenses: Not very hard   Food Insecurity: Not on file   Transportation Needs: No Transportation Needs (2023)    PRAPARE - Transportation     Lack of Transportation (Medical): No     Lack of Transportation (Non-Medical): No   Physical Activity: Not on file   Stress: Not on file   Social Connections: Not on file   Intimate Partner Violence: Not on file   Housing Stability: Not on file      Medications and Allergies:     Current Outpatient Medications   Medication Sig Dispense Refill    Alirocumab (Praluent) 150 MG/ML SOAJ INJECT 1ML UNDER THE SKIN EVERY 14 DAYS 6 mL 2    Ascorbic Acid (vitamin C) 1000 MG tablet Take 1,000 mg by mouth daily      Cholecalciferol 25 MCG  (1000 UT) tablet Take 1,000 Units by mouth daily      clopidogrel (PLAVIX) 75 mg tablet TAKE 1 TABLET BY MOUTH EVERY DAY 90 tablet 1    fluticasone (FLONASE) 50 mcg/act nasal spray 1 spray into each nostril daily 15.8 mL 1    loratadine (CLARITIN) 10 mg tablet Take 10 mg by mouth daily       Melatonin ER 10 MG TBCR Take 10 mg by mouth      metoprolol succinate (TOPROL-XL) 25 mg 24 hr tablet Take 25 mg by mouth every evening       multivitamin-iron-minerals-folic acid (CENTRUM) chewable tablet Chew 1 tablet daily      naloxone (NARCAN) 4 mg/0.1 mL nasal spray Administer 1 spray into a nostril. If no response after 2-3 minutes, give another dose in the other nostril using a new spray. 1 each 1    omega-3-acid ethyl esters (LOVAZA) 1 g capsule TAKE 2 CAPSULES (2 G TOTAL) BY MOUTH IN THE MORNING AND 2 CAPSULES (2 G TOTAL) IN THE EVENING. 360 capsule 4    tadalafil (CIALIS) 20 MG tablet Take 1 tablet (20 mg total) by mouth daily as needed for erectile dysfunction 10 tablet 3    traMADol (ULTRAM) 50 mg tablet Take 2 tablets (100 mg total) by mouth daily as needed for moderate pain 60 tablet 1    fluorouracil (EFUDEX) 5 % cream PLEASE SEE ATTACHED FOR DETAILED DIRECTIONS (Patient not taking: Reported on 5/17/2022)       No current facility-administered medications for this visit.     Allergies   Allergen Reactions    No Active Allergies       Immunizations:     Immunization History   Administered Date(s) Administered    COVID-19 PFIZER VACCINE 0.3 ML IM 03/21/2021, 04/11/2021, 01/15/2022    COVID-19 Pfizer vac (Roebrt-sucrose, gray cap) 12 yr+ IM 08/04/2022    Fluzone Split Quad 0.5 mL 10/13/2016    INFLUENZA 10/13/2016, 11/09/2018, 08/21/2021, 12/16/2022    Influenza, high dose seasonal 0.7 mL 12/16/2022    Influenza, injectable, quadrivalent, preservative free 0.5 mL 11/09/2018      Health Maintenance:         Topic Date Due    Colorectal Cancer Screening  01/19/2028    Hepatitis C Screening  Completed         Topic Date  Due    Pneumococcal Vaccine: 65+ Years (1 - PCV) Never done    Hepatitis A Vaccine (1 of 2 - Risk 2-dose series) Never done    Influenza Vaccine (1) 09/01/2023    COVID-19 Vaccine (5 - 2023-24 season) 09/01/2023      Medicare Screening Tests and Risk Assessments:     Chad is here for his Subsequent Wellness visit. Last Medicare Wellness visit information reviewed, patient interviewed and updates made to the record today.      Health Risk Assessment:   Patient rates overall health as excellent. Patient feels that their physical health rating is slightly better. Patient is very satisfied with their life. Eyesight was rated as same. Hearing was rated as slightly worse. Patient feels that their emotional and mental health rating is same. Patients states they are never, rarely angry. Patient states they are never, rarely unusually tired/fatigued. Pain experienced in the last 7 days has been none. Patient states that he has experienced no weight loss or gain in last 6 months.     Depression Screening:   PHQ-2 Score: 0      Fall Risk Screening:   In the past year, patient has experienced: no history of falling in past year      Home Safety:  Patient does not have trouble with stairs inside or outside of their home. Patient has working smoke alarms and has no working carbon monoxide detector. Home safety hazards include: none.     Nutrition:   Current diet is Regular, Low Carb and Limited junk food.     Medications:   Patient is currently taking over-the-counter supplements. OTC medications include: see medication list. Patient is able to manage medications.     Activities of Daily Living (ADLs)/Instrumental Activities of Daily Living (IADLs):   Walk and transfer into and out of bed and chair?: Yes  Dress and groom yourself?: Yes    Bathe or shower yourself?: Yes    Feed yourself? Yes  Do your laundry/housekeeping?: Yes  Manage your money, pay your bills and track your expenses?: Yes  Make your own meals?: Yes    Do your  "own shopping?: Yes    Previous Hospitalizations:   Any hospitalizations or ED visits within the last 12 months?: No      Advance Care Planning:   Living will: No    Durable POA for healthcare: No    Advanced directive: No      Cognitive Screening:   Provider or family/friend/caregiver concerned regarding cognition?: No    PREVENTIVE SCREENINGS      Cardiovascular Screening:    General: Screening Not Indicated and History Lipid Disorder      Diabetes Screening:     General: Screening Current      Colorectal Cancer Screening:     General: Screening Current      Prostate Cancer Screening:    General: History Prostate Cancer      Osteoporosis Screening:    General: Screening Not Indicated      Abdominal Aortic Aneurysm (AAA) Screening:    Risk factors include: age between 65-74 yo and tobacco use        General: Screening Not Indicated      Lung Cancer Screening:     General: Screening Not Indicated      Hepatitis C Screening:    General: Screening Current    Screening, Brief Intervention, and Referral to Treatment (SBIRT)    Screening  Typical number of drinks in a day: 0  Typical number of drinks in a week: 0  Interpretation: Low risk drinking behavior.    AUDIT-C Screenin) How often did you have a drink containing alcohol in the past year? monthly or less  2) How many drinks did you have on a typical day when you were drinking in the past year? 0  3) How often did you have 6 or more drinks on one occasion in the past year? never    AUDIT-C Score: 1  Interpretation: Score 0-3 (male): Negative screen for alcohol misuse    Single Item Drug Screening:  How often have you used an illegal drug (including marijuana) or a prescription medication for non-medical reasons in the past year? never    Single Item Drug Screen Score: 0  Interpretation: Negative screen for possible drug use disorder    No results found.     Physical Exam:     /80   Pulse (!) 52   Temp (!) 97.3 °F (36.3 °C)   Resp 18   Ht 6' 2\" (1.88 m) "   Wt 101 kg (222 lb)   SpO2 97%   BMI 28.50 kg/m²     Physical Exam  Vitals reviewed.   Constitutional:       General: He is not in acute distress.     Appearance: He is well-developed. He is not diaphoretic.   HENT:      Head: Normocephalic and atraumatic.      Right Ear: External ear normal.      Left Ear: External ear normal.      Nose: Nose normal.   Eyes:      General: No scleral icterus.        Right eye: No discharge.         Left eye: No discharge.      Conjunctiva/sclera: Conjunctivae normal.      Pupils: Pupils are equal, round, and reactive to light.   Neck:      Thyroid: No thyromegaly.      Trachea: No tracheal deviation.   Cardiovascular:      Rate and Rhythm: Normal rate and regular rhythm.      Heart sounds: Normal heart sounds. No murmur heard.     No friction rub.   Pulmonary:      Effort: Pulmonary effort is normal. No respiratory distress.      Breath sounds: Normal breath sounds. No stridor. No wheezing or rales.   Abdominal:      General: There is no distension.      Palpations: Abdomen is soft. There is no mass.      Tenderness: There is no abdominal tenderness. There is no guarding or rebound.   Musculoskeletal:         General: Normal range of motion.      Cervical back: Normal range of motion and neck supple.   Lymphadenopathy:      Cervical: No cervical adenopathy.   Skin:     General: Skin is warm.   Neurological:      Mental Status: He is alert and oriented to person, place, and time.      Cranial Nerves: No cranial nerve deficit.   Psychiatric:         Behavior: Behavior normal.         Thought Content: Thought content normal.         Judgment: Judgment normal.          Kenan Garcia MD

## 2023-12-14 PROBLEM — Z00.00 MEDICARE ANNUAL WELLNESS VISIT, SUBSEQUENT: Status: ACTIVE | Noted: 2023-12-14

## 2023-12-19 ENCOUNTER — OFFICE VISIT (OUTPATIENT)
Dept: OBGYN CLINIC | Facility: MEDICAL CENTER | Age: 68
End: 2023-12-19
Payer: MEDICARE

## 2023-12-19 ENCOUNTER — APPOINTMENT (OUTPATIENT)
Dept: RADIOLOGY | Facility: MEDICAL CENTER | Age: 68
End: 2023-12-19
Payer: MEDICARE

## 2023-12-19 VITALS
HEIGHT: 74 IN | HEART RATE: 60 BPM | BODY MASS INDEX: 28.62 KG/M2 | WEIGHT: 223 LBS | DIASTOLIC BLOOD PRESSURE: 82 MMHG | SYSTOLIC BLOOD PRESSURE: 118 MMHG

## 2023-12-19 DIAGNOSIS — M25.562 CHRONIC PAIN OF BOTH KNEES: ICD-10-CM

## 2023-12-19 DIAGNOSIS — M25.561 CHRONIC PAIN OF BOTH KNEES: Primary | ICD-10-CM

## 2023-12-19 DIAGNOSIS — M25.561 CHRONIC PAIN OF BOTH KNEES: ICD-10-CM

## 2023-12-19 DIAGNOSIS — G89.29 CHRONIC PAIN OF BOTH KNEES: Primary | ICD-10-CM

## 2023-12-19 DIAGNOSIS — M17.0 PRIMARY OSTEOARTHRITIS OF BOTH KNEES: ICD-10-CM

## 2023-12-19 DIAGNOSIS — M25.562 CHRONIC PAIN OF BOTH KNEES: Primary | ICD-10-CM

## 2023-12-19 DIAGNOSIS — G89.29 CHRONIC PAIN OF BOTH KNEES: ICD-10-CM

## 2023-12-19 PROCEDURE — 99204 OFFICE O/P NEW MOD 45 MIN: CPT | Performed by: EMERGENCY MEDICINE

## 2023-12-19 PROCEDURE — 73564 X-RAY EXAM KNEE 4 OR MORE: CPT

## 2023-12-19 PROCEDURE — 20610 DRAIN/INJ JOINT/BURSA W/O US: CPT | Performed by: EMERGENCY MEDICINE

## 2023-12-19 RX ORDER — LIDOCAINE HYDROCHLORIDE 10 MG/ML
4 INJECTION, SOLUTION INFILTRATION; PERINEURAL
Status: COMPLETED | OUTPATIENT
Start: 2023-12-19 | End: 2023-12-19

## 2023-12-19 RX ORDER — TRIAMCINOLONE ACETONIDE 40 MG/ML
40 INJECTION, SUSPENSION INTRA-ARTICULAR; INTRAMUSCULAR
Status: COMPLETED | OUTPATIENT
Start: 2023-12-19 | End: 2023-12-19

## 2023-12-19 RX ADMIN — TRIAMCINOLONE ACETONIDE 40 MG: 40 INJECTION, SUSPENSION INTRA-ARTICULAR; INTRAMUSCULAR at 08:00

## 2023-12-19 RX ADMIN — LIDOCAINE HYDROCHLORIDE 4 ML: 10 INJECTION, SOLUTION INFILTRATION; PERINEURAL at 08:00

## 2023-12-19 NOTE — LETTER
December 20, 2023     Kenan Garcia MD  [address] [address]    Patient: Chad Taylor   YOB: 1955   Date of Visit: 12/19/2023       Dear Dr. Garcia:    Thank you for referring Chad Taylor to me for evaluation. Below are my notes for this consultation.    If you have questions, please do not hesitate to call me. I look forward to following your patient along with you.         Sincerely,        Mark Herbert MD        CC: No Recipients    Mark Herbert MD  12/19/2023  8:47 AM  Signed      Assessment/Plan:    Diagnoses and all orders for this visit:    Chronic pain of both knees  -     XR knee 4+ vw right injury; Future  -     XR knee 4+ vw left injury; Future  -     Large joint arthrocentesis: L knee    Primary osteoarthritis of both knees  -     Ambulatory Referral to Orthopedic Surgery  -     XR knee 4+ vw right injury; Future  -     XR knee 4+ vw left injury; Future  -     Large joint arthrocentesis: L knee    Left knee IA CSI provided today  Obtained and reviewed Xrays showing DJD  Discussed Visco, PT and bracing  Reviewed PCP note    No follow-ups on file.      Subjective:   Patient ID: Chad Taylor is a 68 y.o. male.    NP presents for chronic b/l knee pain left greater than right, he has been dx with OA and undergone CSIs in the past, He did undergo Left knee IA CSI in May with significant benefit up until recently.  Takes Tramadol and Tylenol PRN, unable to take NSAIDs        Review of Systems    The following portions of the patient's chart were reviewed and updated as appropriate:   Allergy:    Allergies   Allergen Reactions   • No Active Allergies        Medications:    Current Outpatient Medications:   •  Alirocumab (Praluent) 150 MG/ML SOAJ, INJECT 1ML UNDER THE SKIN EVERY 14 DAYS, Disp: 6 mL, Rfl: 2  •  Ascorbic Acid (vitamin C) 1000 MG tablet, Take 1,000 mg by mouth daily, Disp: , Rfl:   •  Cholecalciferol 25 MCG (1000 UT) tablet, Take 1,000 Units  by mouth daily, Disp: , Rfl:   •  clopidogrel (PLAVIX) 75 mg tablet, TAKE 1 TABLET BY MOUTH EVERY DAY, Disp: 90 tablet, Rfl: 1  •  fluticasone (FLONASE) 50 mcg/act nasal spray, 1 spray into each nostril daily, Disp: 15.8 mL, Rfl: 1  •  loratadine (CLARITIN) 10 mg tablet, Take 10 mg by mouth daily , Disp: , Rfl:   •  Melatonin ER 10 MG TBCR, Take 10 mg by mouth, Disp: , Rfl:   •  metoprolol succinate (TOPROL-XL) 25 mg 24 hr tablet, Take 25 mg by mouth every evening , Disp: , Rfl:   •  multivitamin-iron-minerals-folic acid (CENTRUM) chewable tablet, Chew 1 tablet daily, Disp: , Rfl:   •  naloxone (NARCAN) 4 mg/0.1 mL nasal spray, Administer 1 spray into a nostril. If no response after 2-3 minutes, give another dose in the other nostril using a new spray., Disp: 1 each, Rfl: 1  •  omega-3-acid ethyl esters (LOVAZA) 1 g capsule, TAKE 2 CAPSULES (2 G TOTAL) BY MOUTH IN THE MORNING AND 2 CAPSULES (2 G TOTAL) IN THE EVENING., Disp: 360 capsule, Rfl: 4  •  tadalafil (CIALIS) 20 MG tablet, Take 1 tablet (20 mg total) by mouth daily as needed for erectile dysfunction, Disp: 10 tablet, Rfl: 3  •  traMADol (ULTRAM) 50 mg tablet, Take 2 tablets (100 mg total) by mouth daily as needed for moderate pain, Disp: 60 tablet, Rfl: 1  •  fluorouracil (EFUDEX) 5 % cream, PLEASE SEE ATTACHED FOR DETAILED DIRECTIONS (Patient not taking: Reported on 5/17/2022), Disp: , Rfl:     Patient Active Problem List   Diagnosis   • Malignant neoplasm of prostate (HCC)   • Abnormal glucose level   • Primary osteoarthritis of both knees   • Adenocarcinoma of prostate (HCC)   • Arthropathy   • Cardiomyopathy, ischemic   • Chest pain   • Chronic bilateral low back pain without sciatica   • Essential hypertension   • Hx pulmonary embolism   • Hyperlipidemia   • Primary osteoarthritis of both hands   • Pulmonary embolism with infarction (HCC)   • Sclerosing mesenteritis (HCC)   • STEMI (ST elevation myocardial infarction) (HCC)   • Toxic myopathy   •  "Coronary artery disease involving native coronary artery of native heart without angina pectoris   • Other male erectile dysfunction   • BPH with urinary obstruction   • Status post primary angioplasty with coronary stent   • Urgency of urination   • Bladder stone   • HLD (hyperlipidemia)   • Statin intolerance   • Elevated PSA   • Benign prostatic hyperplasia   • History of bladder stone   • Prediabetes   • Opioid use disorder, mild, in sustained remission, on maintenance therapy (HCC)   • BPH (benign prostatic hyperplasia)   • Chronic inflammatory arthritis   • Medicare annual wellness visit, subsequent       Objective:  /82   Pulse 60   Ht 6' 2\" (1.88 m)   Wt 101 kg (223 lb)   BMI 28.63 kg/m²     Left Knee Exam     Other   Erythema: absent  Swelling: mild            Physical Exam      Neurologic Exam    Large joint arthrocentesis: L knee  Universal Protocol:  Consent: Verbal consent obtained.  Risks and benefits: risks, benefits and alternatives were discussed  Consent given by: patient  Time out: Immediately prior to procedure a \"time out\" was called to verify the correct patient, procedure, equipment, support staff and site/side marked as required.  Timeout called at: 12/19/2023 8:38 AM.  Patient understanding: patient states understanding of the procedure being performed  Test results: test results available and properly labeled  Site marked: the operative site was marked  Patient identity confirmed: verbally with patient  Supporting Documentation  Indications: pain   Procedure Details  Location: knee - L knee  Preparation: Patient was prepped and draped in the usual sterile fashion  Needle size: 22 G  Ultrasound guidance: no  Approach: anterolateral  Medications administered: 4 mL lidocaine 1 %; 40 mg triamcinolone acetonide 40 mg/mL    Patient tolerance: patient tolerated the procedure well with no immediate complications  Dressing:  Sterile dressing applied    No erythema of knee(s)          I " have personally reviewed pertinent films in PACS and my interpretation is Xrays B/L knees: Diffuse degenerative changes bilateral knees more so of the patellofemoral joint and the medial compartment, no acute fracture or dislocation no osseous lesions.            Past Medical History:   Diagnosis Date   • Acute ST elevation myocardial infarction (STEMI) involving left anterior descending (LAD) coronary artery (HCC) 2016   • Arthritis     low back and fingers   • BCC (basal cell carcinoma of skin)     left shoulder and nose in past   • Bladder stone    • BPH with urinary obstruction    • Coronary artery disease      2 stents   • Elevated PSA    • Erectile dysfunction    • Gout    • History of colonoscopy 2018   • History of DVT in adulthood     RLE   • Hypercholesteremia    • Hypertension    • Malignant neoplasm prostate (HCC)    • Prediabetes    • Pulmonary embolism (HCC)    • Right inguinal hernia    • Sclerosing mesenteritis (HCC)    • Seasonal allergies    • Wears glasses        Past Surgical History:   Procedure Laterality Date   • COLONOSCOPY     • CORONARY STENT PLACEMENT     • CYSTOSCOPY N/A 6/15/2021    Procedure: CYSTOSCOPY;  Surgeon: Dequan Barrientos MD;  Location: [facility];  Service: Urology   • [place] LITHOLAPAXY COMP/LG > 2.5 CM N/A 6/15/2021    Procedure: LITHOLOPAXY HOLMIUM LASER BLADDER STONE;  Surgeon: Dequan Barrientos MD;  Location: [facility];  Service: Urology   • PROSTATE BIOPSY Bilateral    • TONSILLECTOMY     • UMBILICAL HERNIA REPAIR         Social History     Socioeconomic History   • Marital status: /Civil Union     Spouse name: Not on file   • Number of children: Not on file   • Years of education: Not on file   • Highest education level: Not on file   Occupational History   • Occupation: manager   Tobacco Use   • Smoking status: Former     Current packs/day: 0.00     Types: Cigarettes     Quit date:      Years since quittin.0   • Smokeless tobacco: Never   Vaping  Use   • Vaping status: Never Used   Substance and Sexual Activity   • Alcohol use: Yes     Alcohol/week: 1.0 standard drink of alcohol     Types: 1 Cans of beer per week     Comment: beer   • Drug use: No   • Sexual activity: Yes     Partners: Female     Birth control/protection: None   Other Topics Concern   • Not on file   Social History Narrative   • Not on file     Social Determinants of Health     Financial Resource Strain: Low Risk  (12/12/2023)    Overall Financial Resource Strain (CARDIA)    • Difficulty of Paying Living Expenses: Not very hard   Food Insecurity: Not on file   Transportation Needs: No Transportation Needs (12/12/2023)    PRAPARE - Transportation    • Lack of Transportation (Medical): No    • Lack of Transportation (Non-Medical): No   Physical Activity: Not on file   Stress: Not on file   Social Connections: Not on file   Intimate Partner Violence: Not on file   Housing Stability: Not on file       Family History   Problem Relation Age of Onset   • Cancer Family         Bladder cancer   • Diabetes Family    • Heart attack Family    • Hypertension Family    • Heart attack Father    • Gout Father    • Cancer Father    • Prostate cancer Father    • Heart disease Mother    • Brain cancer Brother    • Diabetes Brother

## 2023-12-19 NOTE — PROGRESS NOTES
Assessment/Plan:    Diagnoses and all orders for this visit:    Chronic pain of both knees  -     XR knee 4+ vw right injury; Future  -     XR knee 4+ vw left injury; Future  -     Large joint arthrocentesis: L knee    Primary osteoarthritis of both knees  -     Ambulatory Referral to Orthopedic Surgery  -     XR knee 4+ vw right injury; Future  -     XR knee 4+ vw left injury; Future  -     Large joint arthrocentesis: L knee    Left knee IA CSI provided today  Obtained and reviewed Xrays showing DJD  Discussed Visco, PT and bracing  Reviewed PCP note    No follow-ups on file.      Subjective:   Patient ID: Chad Taylor is a 68 y.o. male.    NP presents for chronic b/l knee pain left greater than right, he has been dx with OA and undergone CSIs in the past, He did undergo Left knee IA CSI in May with significant benefit up until recently.  Takes Tramadol and Tylenol PRN, unable to take NSAIDs        Review of Systems    The following portions of the patient's chart were reviewed and updated as appropriate:   Allergy:    Allergies   Allergen Reactions    No Active Allergies        Medications:    Current Outpatient Medications:     Alirocumab (Praluent) 150 MG/ML SOAJ, INJECT 1ML UNDER THE SKIN EVERY 14 DAYS, Disp: 6 mL, Rfl: 2    Ascorbic Acid (vitamin C) 1000 MG tablet, Take 1,000 mg by mouth daily, Disp: , Rfl:     Cholecalciferol 25 MCG (1000 UT) tablet, Take 1,000 Units by mouth daily, Disp: , Rfl:     clopidogrel (PLAVIX) 75 mg tablet, TAKE 1 TABLET BY MOUTH EVERY DAY, Disp: 90 tablet, Rfl: 1    fluticasone (FLONASE) 50 mcg/act nasal spray, 1 spray into each nostril daily, Disp: 15.8 mL, Rfl: 1    loratadine (CLARITIN) 10 mg tablet, Take 10 mg by mouth daily , Disp: , Rfl:     Melatonin ER 10 MG TBCR, Take 10 mg by mouth, Disp: , Rfl:     metoprolol succinate (TOPROL-XL) 25 mg 24 hr tablet, Take 25 mg by mouth every evening , Disp: , Rfl:     multivitamin-iron-minerals-folic acid (CENTRUM) chewable  tablet, Chew 1 tablet daily, Disp: , Rfl:     naloxone (NARCAN) 4 mg/0.1 mL nasal spray, Administer 1 spray into a nostril. If no response after 2-3 minutes, give another dose in the other nostril using a new spray., Disp: 1 each, Rfl: 1    omega-3-acid ethyl esters (LOVAZA) 1 g capsule, TAKE 2 CAPSULES (2 G TOTAL) BY MOUTH IN THE MORNING AND 2 CAPSULES (2 G TOTAL) IN THE EVENING., Disp: 360 capsule, Rfl: 4    tadalafil (CIALIS) 20 MG tablet, Take 1 tablet (20 mg total) by mouth daily as needed for erectile dysfunction, Disp: 10 tablet, Rfl: 3    traMADol (ULTRAM) 50 mg tablet, Take 2 tablets (100 mg total) by mouth daily as needed for moderate pain, Disp: 60 tablet, Rfl: 1    fluorouracil (EFUDEX) 5 % cream, PLEASE SEE ATTACHED FOR DETAILED DIRECTIONS (Patient not taking: Reported on 5/17/2022), Disp: , Rfl:     Patient Active Problem List   Diagnosis    Malignant neoplasm of prostate (HCC)    Abnormal glucose level    Primary osteoarthritis of both knees    Adenocarcinoma of prostate (HCC)    Arthropathy    Cardiomyopathy, ischemic    Chest pain    Chronic bilateral low back pain without sciatica    Essential hypertension    Hx pulmonary embolism    Hyperlipidemia    Primary osteoarthritis of both hands    Pulmonary embolism with infarction (HCC)    Sclerosing mesenteritis (HCC)    STEMI (ST elevation myocardial infarction) (HCC)    Toxic myopathy    Coronary artery disease involving native coronary artery of native heart without angina pectoris    Other male erectile dysfunction    BPH with urinary obstruction    Status post primary angioplasty with coronary stent    Urgency of urination    Bladder stone    HLD (hyperlipidemia)    Statin intolerance    Elevated PSA    Benign prostatic hyperplasia    History of bladder stone    Prediabetes    Opioid use disorder, mild, in sustained remission, on maintenance therapy (HCC)    BPH (benign prostatic hyperplasia)    Chronic inflammatory arthritis    Medicare annual  "wellness visit, subsequent       Objective:  /82   Pulse 60   Ht 6' 2\" (1.88 m)   Wt 101 kg (223 lb)   BMI 28.63 kg/m²     Left Knee Exam     Other   Erythema: absent  Swelling: mild            Physical Exam      Neurologic Exam    Large joint arthrocentesis: L knee  Universal Protocol:  Consent: Verbal consent obtained.  Risks and benefits: risks, benefits and alternatives were discussed  Consent given by: patient  Time out: Immediately prior to procedure a \"time out\" was called to verify the correct patient, procedure, equipment, support staff and site/side marked as required.  Timeout called at: 12/19/2023 8:38 AM.  Patient understanding: patient states understanding of the procedure being performed  Test results: test results available and properly labeled  Site marked: the operative site was marked  Patient identity confirmed: verbally with patient  Supporting Documentation  Indications: pain   Procedure Details  Location: knee - L knee  Preparation: Patient was prepped and draped in the usual sterile fashion  Needle size: 22 G  Ultrasound guidance: no  Approach: anterolateral  Medications administered: 4 mL lidocaine 1 %; 40 mg triamcinolone acetonide 40 mg/mL    Patient tolerance: patient tolerated the procedure well with no immediate complications  Dressing:  Sterile dressing applied    No erythema of knee(s)          I have personally reviewed pertinent films in PACS and my interpretation is Xrays B/L knees: Diffuse degenerative changes bilateral knees more so of the patellofemoral joint and the medial compartment, no acute fracture or dislocation no osseous lesions.            Past Medical History:   Diagnosis Date    Acute ST elevation myocardial infarction (STEMI) involving left anterior descending (LAD) coronary artery (HCC) 09/08/2016    Arthritis     low back and fingers    BCC (basal cell carcinoma of skin)     left shoulder and nose in past    Bladder stone     BPH with urinary obstruction "     Coronary artery disease      2 stents    Elevated PSA     Erectile dysfunction     Gout     History of colonoscopy 2018    History of DVT in adulthood     RLE    Hypercholesteremia     Hypertension     Malignant neoplasm prostate (HCC)     Prediabetes     Pulmonary embolism (HCC)     Right inguinal hernia     Sclerosing mesenteritis (HCC)     Seasonal allergies     Wears glasses        Past Surgical History:   Procedure Laterality Date    COLONOSCOPY      CORONARY STENT PLACEMENT      CYSTOSCOPY N/A 6/15/2021    Procedure: CYSTOSCOPY;  Surgeon: Dequan Barrientos MD;  Location: [facility];  Service: Urology    [place] LITHOLAPAXY COMP/LG > 2.5 CM N/A 6/15/2021    Procedure: LITHOLOPAXY HOLMIUM LASER BLADDER STONE;  Surgeon: Dequan Barrientos MD;  Location: [facility];  Service: Urology    PROSTATE BIOPSY Bilateral 2011    TONSILLECTOMY      UMBILICAL HERNIA REPAIR         Social History     Socioeconomic History    Marital status: /Civil Union     Spouse name: Not on file    Number of children: Not on file    Years of education: Not on file    Highest education level: Not on file   Occupational History    Occupation: manager   Tobacco Use    Smoking status: Former     Current packs/day: 0.00     Types: Cigarettes     Quit date:      Years since quittin.0    Smokeless tobacco: Never   Vaping Use    Vaping status: Never Used   Substance and Sexual Activity    Alcohol use: Yes     Alcohol/week: 1.0 standard drink of alcohol     Types: 1 Cans of beer per week     Comment: beer    Drug use: No    Sexual activity: Yes     Partners: Female     Birth control/protection: None   Other Topics Concern    Not on file   Social History Narrative    Not on file     Social Determinants of Health     Financial Resource Strain: Low Risk  (2023)    Overall Financial Resource Strain (CARDIA)     Difficulty of Paying Living Expenses: Not very hard   Food Insecurity: Not on file   Transportation Needs: No Transportation  Needs (12/12/2023)    PRAPARE - Transportation     Lack of Transportation (Medical): No     Lack of Transportation (Non-Medical): No   Physical Activity: Not on file   Stress: Not on file   Social Connections: Not on file   Intimate Partner Violence: Not on file   Housing Stability: Not on file       Family History   Problem Relation Age of Onset    Cancer Family         Bladder cancer    Diabetes Family     Heart attack Family     Hypertension Family     Heart attack Father     Gout Father     Cancer Father     Prostate cancer Father     Heart disease Mother     Brain cancer Brother     Diabetes Brother

## 2024-01-02 ENCOUNTER — TELEPHONE (OUTPATIENT)
Dept: FAMILY MEDICINE CLINIC | Facility: CLINIC | Age: 69
End: 2024-01-02

## 2024-01-02 NOTE — TELEPHONE ENCOUNTER
The PCV20 and admin fee are 12/29/23 and everything else is 12/12/23.  If the PCV was given 12/12 can the date be changed please.  Thank you.

## 2024-01-02 NOTE — TELEPHONE ENCOUNTER
Please check this one.  Visits are listed as 12/12/23 but the immunizations are 12/29/23.  Thank  you.

## 2024-01-29 DIAGNOSIS — I21.02 ST ELEVATION MYOCARDIAL INFARCTION INVOLVING LEFT ANTERIOR DESCENDING (LAD) CORONARY ARTERY (HCC): ICD-10-CM

## 2024-01-29 NOTE — TELEPHONE ENCOUNTER
Patient called stating that the insurance will no longer cover the Praluent and needs to be placed back on the Repatha. Patient also states once its refill he will need a prior auth done right away as well    Reason for call:   [x] Refill   [] Prior Auth  [] Other:     Office:   [x] PCP/Provider -   [] Specialty/Provider -     Medication:   Repatha 140mg/ml soaj- Inject 1ML under the skin ever 14 days      Pharmacy: [facility] [address]    Does the patient have enough for 3 days?   [] Yes   [x] No - Send as HP to POD

## 2024-01-30 RX ORDER — EVOLOCUMAB 140 MG/ML
1 INJECTION, SOLUTION SUBCUTANEOUS
Qty: 15 ML | Refills: 2 | Status: SHIPPED | OUTPATIENT
Start: 2024-01-30

## 2024-02-10 DIAGNOSIS — J30.1 ALLERGIC RHINITIS DUE TO POLLEN, UNSPECIFIED SEASONALITY: ICD-10-CM

## 2024-02-12 PROBLEM — Z00.00 MEDICARE ANNUAL WELLNESS VISIT, SUBSEQUENT: Status: RESOLVED | Noted: 2023-12-14 | Resolved: 2024-02-12

## 2024-02-12 RX ORDER — FLUTICASONE PROPIONATE 50 MCG
SPRAY, SUSPENSION (ML) NASAL
Qty: 24 ML | Refills: 2 | Status: SHIPPED | OUTPATIENT
Start: 2024-02-12

## 2024-02-26 DIAGNOSIS — J30.1 ALLERGIC RHINITIS DUE TO POLLEN, UNSPECIFIED SEASONALITY: ICD-10-CM

## 2024-02-27 RX ORDER — FLUTICASONE PROPIONATE 50 MCG
SPRAY, SUSPENSION (ML) NASAL
Qty: 24 ML | Refills: 1 | Status: SHIPPED | OUTPATIENT
Start: 2024-02-27

## 2024-02-28 DIAGNOSIS — M10.00 IDIOPATHIC GOUT, UNSPECIFIED CHRONICITY, UNSPECIFIED SITE: ICD-10-CM

## 2024-02-28 DIAGNOSIS — Z98.61 STATUS POST PERCUTANEOUS TRANSLUMINAL CORONARY ANGIOPLASTY: ICD-10-CM

## 2024-02-28 DIAGNOSIS — I25.10 CORONARY ARTERY DISEASE INVOLVING NATIVE CORONARY ARTERY OF NATIVE HEART WITHOUT ANGINA PECTORIS: ICD-10-CM

## 2024-02-28 DIAGNOSIS — C61 ADENOCARCINOMA OF PROSTATE (HCC): ICD-10-CM

## 2024-02-28 DIAGNOSIS — E78.2 MIXED HYPERLIPIDEMIA: ICD-10-CM

## 2024-02-28 RX ORDER — EVOLOCUMAB 420 MG/3.5
420 KIT SUBCUTANEOUS
Qty: 1 ML | Refills: 3 | Status: SHIPPED | OUTPATIENT
Start: 2024-02-28

## 2024-02-29 ENCOUNTER — TELEPHONE (OUTPATIENT)
Dept: FAMILY MEDICINE CLINIC | Facility: CLINIC | Age: 69
End: 2024-02-29

## 2024-03-05 NOTE — TELEPHONE ENCOUNTER
PA for Repatha    Submitted via    [x]CMM-KEY [id number]  []SurescriZoop-Case ID #   []Faxed to plan   []Other website   []Phone call Case ID #     Office notes sent, clinical questions answered. Awaiting determination    Turnaround time for your insurance to make a decision on your Prior Authorization can take 7-21 business days.

## 2024-03-06 NOTE — TELEPHONE ENCOUNTER
PA for  Repatha Approved   Date(s) approved 1/1/24-12/31/24  Case #    Patient advised by [x] Rocket Fuelt Message                      [x] Phone call       Pharmacy advised by [x]Fax                                     []Phone call    Approval letter scanned into Media Yes

## 2024-03-06 NOTE — TELEPHONE ENCOUNTER
Called pt to advise Medication Repatha has been approved by the insurance. Your pharmacy has been made aware of your approval, please call them to see when your medication will be available for .    []Spoke with pt. Verbal understanding  [x]LMOM   []L/M to call office back. Communication consent not updated in chart  []Other

## 2024-05-24 DIAGNOSIS — Z00.6 ENCOUNTER FOR EXAMINATION FOR NORMAL COMPARISON OR CONTROL IN CLINICAL RESEARCH PROGRAM: ICD-10-CM

## 2024-05-28 ENCOUNTER — APPOINTMENT (OUTPATIENT)
Dept: LAB | Facility: MEDICAL CENTER | Age: 69
End: 2024-05-28

## 2024-05-28 DIAGNOSIS — Z00.6 ENCOUNTER FOR EXAMINATION FOR NORMAL COMPARISON OR CONTROL IN CLINICAL RESEARCH PROGRAM: ICD-10-CM

## 2024-05-28 PROCEDURE — 36415 COLL VENOUS BLD VENIPUNCTURE: CPT

## 2024-06-10 ENCOUNTER — TELEPHONE (OUTPATIENT)
Age: 69
End: 2024-06-10

## 2024-06-10 DIAGNOSIS — R73.09 ELEVATED GLUCOSE: ICD-10-CM

## 2024-06-10 DIAGNOSIS — R97.20 ELEVATED PSA: Primary | ICD-10-CM

## 2024-06-10 NOTE — TELEPHONE ENCOUNTER
Patient called asking if Dr. Garcia would like him to have any blood work drawn prior to his OV on 6/13.

## 2024-06-13 ENCOUNTER — RA CDI HCC (OUTPATIENT)
Dept: OTHER | Facility: HOSPITAL | Age: 69
End: 2024-06-13

## 2024-06-15 ENCOUNTER — LAB (OUTPATIENT)
Dept: LAB | Facility: MEDICAL CENTER | Age: 69
End: 2024-06-15
Payer: MEDICARE

## 2024-06-15 DIAGNOSIS — R97.20 ELEVATED PSA: ICD-10-CM

## 2024-06-15 DIAGNOSIS — R73.09 ELEVATED GLUCOSE: ICD-10-CM

## 2024-06-15 LAB
ALBUMIN SERPL BCP-MCNC: 4.4 G/DL (ref 3.5–5)
ALP SERPL-CCNC: 42 U/L (ref 34–104)
ALT SERPL W P-5'-P-CCNC: 19 U/L (ref 7–52)
ANION GAP SERPL CALCULATED.3IONS-SCNC: 10 MMOL/L (ref 4–13)
APOB+LDLR+PCSK9 GENE MUT ANL BLD/T: NOT DETECTED
AST SERPL W P-5'-P-CCNC: 21 U/L (ref 13–39)
BILIRUB SERPL-MCNC: 0.58 MG/DL (ref 0.2–1)
BRCA1+BRCA2 DEL+DUP + FULL MUT ANL BLD/T: NOT DETECTED
BUN SERPL-MCNC: 15 MG/DL (ref 5–25)
CALCIUM SERPL-MCNC: 9.7 MG/DL (ref 8.4–10.2)
CHLORIDE SERPL-SCNC: 105 MMOL/L (ref 96–108)
CO2 SERPL-SCNC: 26 MMOL/L (ref 21–32)
CREAT SERPL-MCNC: 0.92 MG/DL (ref 0.6–1.3)
GFR SERPL CREATININE-BSD FRML MDRD: 83 ML/MIN/1.73SQ M
GLUCOSE P FAST SERPL-MCNC: 103 MG/DL (ref 65–99)
MLH1+MSH2+MSH6+PMS2 GN DEL+DUP+FUL M: NOT DETECTED
POTASSIUM SERPL-SCNC: 4.2 MMOL/L (ref 3.5–5.3)
PROT SERPL-MCNC: 7 G/DL (ref 6.4–8.4)
PSA SERPL-MCNC: 3.29 NG/ML (ref 0–4)
SODIUM SERPL-SCNC: 141 MMOL/L (ref 135–147)

## 2024-06-15 PROCEDURE — 80053 COMPREHEN METABOLIC PANEL: CPT

## 2024-06-15 PROCEDURE — 36415 COLL VENOUS BLD VENIPUNCTURE: CPT

## 2024-06-15 PROCEDURE — 84153 ASSAY OF PSA TOTAL: CPT

## 2024-06-17 DIAGNOSIS — I21.02 ST ELEVATION MYOCARDIAL INFARCTION INVOLVING LEFT ANTERIOR DESCENDING (LAD) CORONARY ARTERY (HCC): ICD-10-CM

## 2024-06-17 RX ORDER — EVOLOCUMAB 140 MG/ML
INJECTION, SOLUTION SUBCUTANEOUS
Qty: 2 ML | Refills: 2 | Status: SHIPPED | OUTPATIENT
Start: 2024-06-17

## 2024-06-20 ENCOUNTER — OFFICE VISIT (OUTPATIENT)
Dept: FAMILY MEDICINE CLINIC | Facility: CLINIC | Age: 69
End: 2024-06-20
Payer: MEDICARE

## 2024-06-20 VITALS
HEIGHT: 74 IN | DIASTOLIC BLOOD PRESSURE: 74 MMHG | TEMPERATURE: 97.2 F | SYSTOLIC BLOOD PRESSURE: 128 MMHG | BODY MASS INDEX: 28.63 KG/M2 | OXYGEN SATURATION: 96 % | HEART RATE: 63 BPM

## 2024-06-20 DIAGNOSIS — I25.10 CORONARY ARTERY DISEASE INVOLVING NATIVE CORONARY ARTERY OF NATIVE HEART WITHOUT ANGINA PECTORIS: ICD-10-CM

## 2024-06-20 DIAGNOSIS — I25.5 CARDIOMYOPATHY, ISCHEMIC: Primary | ICD-10-CM

## 2024-06-20 DIAGNOSIS — C61 ADENOCARCINOMA OF PROSTATE (HCC): ICD-10-CM

## 2024-06-20 DIAGNOSIS — I21.02 ST ELEVATION MYOCARDIAL INFARCTION INVOLVING LEFT ANTERIOR DESCENDING (LAD) CORONARY ARTERY (HCC): ICD-10-CM

## 2024-06-20 DIAGNOSIS — I10 ESSENTIAL HYPERTENSION: ICD-10-CM

## 2024-06-20 DIAGNOSIS — K65.4 SCLEROSING MESENTERITIS (HCC): ICD-10-CM

## 2024-06-20 DIAGNOSIS — R73.03 PREDIABETES: ICD-10-CM

## 2024-06-20 DIAGNOSIS — E78.2 MIXED HYPERLIPIDEMIA: ICD-10-CM

## 2024-06-20 DIAGNOSIS — I26.99 PULMONARY EMBOLISM WITH INFARCTION (HCC): ICD-10-CM

## 2024-06-20 PROCEDURE — G2211 COMPLEX E/M VISIT ADD ON: HCPCS | Performed by: FAMILY MEDICINE

## 2024-06-20 PROCEDURE — 99214 OFFICE O/P EST MOD 30 MIN: CPT | Performed by: FAMILY MEDICINE

## 2024-06-24 NOTE — PROGRESS NOTES
Assessment/Plan:    68 y/o male with: ischemic cardiomyopathy, pulmonary embolism, adenocarcinoma of prostate, CAD s/p STEMI, HTN, HLD, sclerosing mesenteritis and prediabetes. Will continue meds. And encourage follow-up with specialists. Will check labs and follow-up echo. Discussed supportive care and return parameters.     No problem-specific Assessment & Plan notes found for this encounter.       Diagnoses and all orders for this visit:    Cardiomyopathy, ischemic  -     CBC and differential; Future  -     Comprehensive metabolic panel; Future  -     TSH, 3rd generation with Free T4 reflex; Future  -     Lipid Panel with Direct LDL reflex; Future  -     Hemoglobin A1C; Future  -     Echo complete w/ contrast if indicated; Future  -     Testosterone, free, total; Future    Pulmonary embolism with infarction (HCC)  -     CBC and differential; Future  -     Comprehensive metabolic panel; Future  -     TSH, 3rd generation with Free T4 reflex; Future  -     Lipid Panel with Direct LDL reflex; Future  -     Hemoglobin A1C; Future  -     Testosterone, free, total; Future    Adenocarcinoma of prostate (HCC)  -     CBC and differential; Future  -     Comprehensive metabolic panel; Future  -     TSH, 3rd generation with Free T4 reflex; Future  -     Lipid Panel with Direct LDL reflex; Future  -     Hemoglobin A1C; Future  -     Testosterone, free, total; Future    Coronary artery disease involving native coronary artery of native heart without angina pectoris  -     CBC and differential; Future  -     Comprehensive metabolic panel; Future  -     TSH, 3rd generation with Free T4 reflex; Future  -     Lipid Panel with Direct LDL reflex; Future  -     Hemoglobin A1C; Future  -     Echo complete w/ contrast if indicated; Future  -     Testosterone, free, total; Future    Essential hypertension  -     CBC and differential; Future  -     Comprehensive metabolic panel; Future  -     TSH, 3rd generation with Free T4 reflex;  Future  -     Lipid Panel with Direct LDL reflex; Future  -     Hemoglobin A1C; Future  -     Testosterone, free, total; Future    ST elevation myocardial infarction involving left anterior descending (LAD) coronary artery (HCC)  -     CBC and differential; Future  -     Comprehensive metabolic panel; Future  -     TSH, 3rd generation with Free T4 reflex; Future  -     Lipid Panel with Direct LDL reflex; Future  -     Hemoglobin A1C; Future  -     Echo complete w/ contrast if indicated; Future  -     Testosterone, free, total; Future    Sclerosing mesenteritis (HCC)  -     CBC and differential; Future  -     Comprehensive metabolic panel; Future  -     TSH, 3rd generation with Free T4 reflex; Future  -     Lipid Panel with Direct LDL reflex; Future  -     Hemoglobin A1C; Future  -     Testosterone, free, total; Future    Mixed hyperlipidemia  -     CBC and differential; Future  -     Comprehensive metabolic panel; Future  -     TSH, 3rd generation with Free T4 reflex; Future  -     Lipid Panel with Direct LDL reflex; Future  -     Hemoglobin A1C; Future  -     Testosterone, free, total; Future    Prediabetes  -     CBC and differential; Future  -     Comprehensive metabolic panel; Future  -     TSH, 3rd generation with Free T4 reflex; Future  -     Lipid Panel with Direct LDL reflex; Future  -     Hemoglobin A1C; Future  -     Testosterone, free, total; Future          Subjective:     Chief Complaint   Patient presents with    Follow-up        Patient ID: Chad Taylor is a 69 y.o. male.    Patient is a 68 y/o male who presents for follow-up on ischemic cardiomyopathy, pulmonary embolism, adenocarcinoma of prostate, CAD s/p STEMI, HTN, HLD, sclerosing mesenteritis and prediabetes. Pt admits being stable on meds. And denies acute complaints no fevers chills nausea or vomiting.        The following portions of the patient's history were reviewed and updated as appropriate: allergies, current medications, past  "family history, past medical history, past social history, past surgical history and problem list.    Review of Systems   Constitutional: Negative.    HENT: Negative.     Eyes: Negative.    Respiratory: Negative.     Cardiovascular: Negative.    Gastrointestinal: Negative.    Endocrine: Negative.    Genitourinary: Negative.    Musculoskeletal: Negative.    Allergic/Immunologic: Negative.    Neurological: Negative.    Hematological: Negative.    Psychiatric/Behavioral: Negative.     All other systems reviewed and are negative.        Objective:      /74 (BP Location: Right arm, Patient Position: Sitting, Cuff Size: Adult)   Pulse 63   Temp (!) 97.2 °F (36.2 °C) (Tympanic)   Ht 6' 2\" (1.88 m)   SpO2 96%   BMI 28.63 kg/m²          Physical Exam  Constitutional:       Appearance: He is well-developed.   HENT:      Head: Atraumatic.      Right Ear: External ear normal.      Left Ear: External ear normal.   Eyes:      Extraocular Movements: EOM normal.      Conjunctiva/sclera: Conjunctivae normal.      Pupils: Pupils are equal, round, and reactive to light.   Cardiovascular:      Rate and Rhythm: Normal rate and regular rhythm.      Heart sounds: Normal heart sounds.   Pulmonary:      Effort: Pulmonary effort is normal. No respiratory distress.      Breath sounds: Normal breath sounds.   Abdominal:      General: There is no distension.      Palpations: Abdomen is soft.      Tenderness: There is no abdominal tenderness. There is no guarding or rebound.   Musculoskeletal:         General: Normal range of motion.      Cervical back: Normal range of motion.   Skin:     General: Skin is warm and dry.   Neurological:      Mental Status: He is alert and oriented to person, place, and time.      Cranial Nerves: No cranial nerve deficit.   Psychiatric:         Mood and Affect: Mood and affect normal.         Behavior: Behavior normal.         Thought Content: Thought content normal.         Judgment: Judgment normal. "

## 2024-07-09 ENCOUNTER — OFFICE VISIT (OUTPATIENT)
Dept: OBGYN CLINIC | Facility: MEDICAL CENTER | Age: 69
End: 2024-07-09
Payer: MEDICARE

## 2024-07-09 VITALS
WEIGHT: 219 LBS | HEIGHT: 74 IN | DIASTOLIC BLOOD PRESSURE: 82 MMHG | BODY MASS INDEX: 28.11 KG/M2 | HEART RATE: 62 BPM | SYSTOLIC BLOOD PRESSURE: 120 MMHG

## 2024-07-09 DIAGNOSIS — M25.561 CHRONIC PAIN OF BOTH KNEES: Primary | ICD-10-CM

## 2024-07-09 DIAGNOSIS — M25.562 CHRONIC PAIN OF BOTH KNEES: Primary | ICD-10-CM

## 2024-07-09 DIAGNOSIS — G89.29 CHRONIC PAIN OF BOTH KNEES: Primary | ICD-10-CM

## 2024-07-09 DIAGNOSIS — M17.0 PRIMARY OSTEOARTHRITIS OF BOTH KNEES: ICD-10-CM

## 2024-07-09 PROCEDURE — 99213 OFFICE O/P EST LOW 20 MIN: CPT | Performed by: EMERGENCY MEDICINE

## 2024-07-09 PROCEDURE — 20610 DRAIN/INJ JOINT/BURSA W/O US: CPT | Performed by: EMERGENCY MEDICINE

## 2024-07-09 RX ORDER — TRIAMCINOLONE ACETONIDE 40 MG/ML
40 INJECTION, SUSPENSION INTRA-ARTICULAR; INTRAMUSCULAR
Status: COMPLETED | OUTPATIENT
Start: 2024-07-09 | End: 2024-07-09

## 2024-07-09 RX ORDER — ROPIVACAINE HYDROCHLORIDE 2 MG/ML
4 INJECTION, SOLUTION EPIDURAL; INFILTRATION; PERINEURAL
Status: SHIPPED | OUTPATIENT
Start: 2024-07-09

## 2024-07-09 RX ADMIN — TRIAMCINOLONE ACETONIDE 40 MG: 40 INJECTION, SUSPENSION INTRA-ARTICULAR; INTRAMUSCULAR at 08:00

## 2024-07-09 RX ADMIN — ROPIVACAINE HYDROCHLORIDE 4 ML: 2 INJECTION, SOLUTION EPIDURAL; INFILTRATION; PERINEURAL at 08:34

## 2024-07-09 NOTE — PROGRESS NOTES
Assessment/Plan:    Diagnoses and all orders for this visit:    Chronic pain of both knees  -     Large joint arthrocentesis: bilateral knee  -     ropivacaine (NAROPIN) injection 4 mL    Primary osteoarthritis of both knees  -     Large joint arthrocentesis: bilateral knee  -     ropivacaine (NAROPIN) injection 4 mL    Bilateral knee CSI's provided today we have provided a pamphlet regarding Visco injections    Return in about 3 months (around 10/9/2024).      Subjective:   Patient ID: Chad Taylor is a 69 y.o. male.    Gene returns for chronic b/l knee pain s/p Left knee CSI last eval, requesting steroid injections bilateral knees    Initial note 12/2023: NP presents for chronic b/l knee pain left greater than right, he has been dx with OA and undergone CSIs in the past, He did undergo Left knee IA CSI in May with significant benefit up until recently.  Takes Tramadol and Tylenol PRN, unable to take NSAIDs      Review of Systems    The following portions of the patient's chart were reviewed and updated as appropriate:   Allergy:    Allergies   Allergen Reactions    No Active Allergies        Medications:    Current Outpatient Medications:     Ascorbic Acid (vitamin C) 1000 MG tablet, Take 1,000 mg by mouth daily, Disp: , Rfl:     Cholecalciferol 25 MCG (1000 UT) tablet, Take 1,000 Units by mouth daily, Disp: , Rfl:     clopidogrel (PLAVIX) 75 mg tablet, TAKE 1 TABLET BY MOUTH EVERY DAY, Disp: 90 tablet, Rfl: 1    Evolocumab (Repatha SureClick) 140 MG/ML SOAJ, INJECT 1 ML (140 MG TOTAL) UNDER THE SKIN EVERY 14 DAYS., Disp: 2 mL, Rfl: 2    fluticasone (FLONASE) 50 mcg/act nasal spray, SPRAY 1 SPRAY INTO EACH NOSTRIL EVERY DAY, Disp: 24 mL, Rfl: 1    loratadine (CLARITIN) 10 mg tablet, Take 10 mg by mouth daily , Disp: , Rfl:     Melatonin ER 10 MG TBCR, Take 10 mg by mouth As needed, Disp: , Rfl:     metoprolol succinate (TOPROL-XL) 25 mg 24 hr tablet, Take 25 mg by mouth every evening , Disp: , Rfl:      multivitamin-iron-minerals-folic acid (CENTRUM) chewable tablet, Chew 1 tablet daily, Disp: , Rfl:     naloxone (NARCAN) 4 mg/0.1 mL nasal spray, Administer 1 spray into a nostril. If no response after 2-3 minutes, give another dose in the other nostril using a new spray., Disp: 1 each, Rfl: 1    omega-3-acid ethyl esters (LOVAZA) 1 g capsule, TAKE 2 CAPSULES (2 G TOTAL) BY MOUTH IN THE MORNING AND 2 CAPSULES (2 G TOTAL) IN THE EVENING., Disp: 360 capsule, Rfl: 4    tadalafil (CIALIS) 20 MG tablet, Take 1 tablet (20 mg total) by mouth daily as needed for erectile dysfunction (Patient taking differently: Take 20 mg by mouth daily as needed for erectile dysfunction As needed), Disp: 10 tablet, Rfl: 3    traMADol (ULTRAM) 50 mg tablet, Take 2 tablets (100 mg total) by mouth daily as needed for moderate pain, Disp: 60 tablet, Rfl: 1    fluorouracil (EFUDEX) 5 % cream, PLEASE SEE ATTACHED FOR DETAILED DIRECTIONS (Patient not taking: Reported on 5/17/2022), Disp: , Rfl:     Repatha Pushtronex System 420 MG/3.5ML SOCT, INJECT 3.5 ML (420 MG TOTAL) UNDER THE SKIN EVERY 30 (THIRTY) DAYS, Disp: 10.5 mL, Rfl: 2    Current Facility-Administered Medications:     ropivacaine (NAROPIN) injection 4 mL, 4 mL, Intra-articular, Titrated,     Patient Active Problem List   Diagnosis    Malignant neoplasm of prostate (HCC)    Abnormal glucose level    Primary osteoarthritis of both knees    Adenocarcinoma of prostate (HCC)    Arthropathy    Cardiomyopathy, ischemic    Chest pain    Chronic bilateral low back pain without sciatica    Essential hypertension    Hx pulmonary embolism    Hyperlipidemia    Primary osteoarthritis of both hands    Pulmonary embolism with infarction (HCC)    Sclerosing mesenteritis (HCC)    STEMI (ST elevation myocardial infarction) (HCC)    Toxic myopathy    Coronary artery disease involving native coronary artery of native heart without angina pectoris    Other male erectile dysfunction    BPH with urinary  "obstruction    Status post primary angioplasty with coronary stent    Urgency of urination    Bladder stone    HLD (hyperlipidemia)    Statin intolerance    Elevated PSA    Benign prostatic hyperplasia    History of bladder stone    Prediabetes    Opioid use disorder, mild, in sustained remission, on maintenance therapy (HCC)    BPH (benign prostatic hyperplasia)    Chronic inflammatory arthritis       Objective:  /82   Pulse 62   Ht 6' 2\" (1.88 m)   Wt 99.3 kg (219 lb)   BMI 28.12 kg/m²     Right Knee Exam     Other   Erythema: absent  Swelling: mild      Left Knee Exam     Other   Erythema: absent  Swelling: mild            Physical Exam      Neurologic Exam    Large joint arthrocentesis: bilateral knee  Universal Protocol:  Consent: Verbal consent obtained.  Risks and benefits: risks, benefits and alternatives were discussed  Consent given by: patient  Time out: Immediately prior to procedure a \"time out\" was called to verify the correct patient, procedure, equipment, support staff and site/side marked as required.  Timeout called at: 7/9/2024 8:33 AM.  Patient understanding: patient states understanding of the procedure being performed  Test results: test results available and properly labeled  Site marked: the operative site was marked  Patient identity confirmed: verbally with patient  Supporting Documentation  Indications: pain   Procedure Details  Location: knee - bilateral knee  Preparation: Patient was prepped and draped in the usual sterile fashion  Needle size: 22 G  Ultrasound guidance: no  Approach: anterolateral    Medications (Right): 40 mg triamcinolone acetonide 40 mg/mLMedications (Left): 40 mg triamcinolone acetonide 40 mg/mL   Patient tolerance: patient tolerated the procedure well with no immediate complications  Dressing:  Sterile dressing applied    No erythema of knee(s)        I have personally reviewed the written report of the pertinent studies.             Past Medical History: "   Diagnosis Date    Acute ST elevation myocardial infarction (STEMI) involving left anterior descending (LAD) coronary artery (HCC) 2016    Arthritis     low back and fingers    BCC (basal cell carcinoma of skin)     left shoulder and nose in past    Bladder stone     BPH with urinary obstruction     Coronary artery disease      2 stents    Elevated PSA     Erectile dysfunction     Gout     History of colonoscopy 2018    History of DVT in adulthood     RLE    Hypercholesteremia     Hypertension     Malignant neoplasm prostate (HCC)     Prediabetes     Pulmonary embolism (HCC)     Right inguinal hernia     Sclerosing mesenteritis (HCC)     Seasonal allergies     Wears glasses        Past Surgical History:   Procedure Laterality Date    COLONOSCOPY      CORONARY STENT PLACEMENT      CYSTOSCOPY N/A 06/15/2021    Procedure: CYSTOSCOPY;  Surgeon: Dequan Barrientos MD;  Location: [facility];  Service: Urology    TN LITHOLAPAXY COMP/LG > 2.5 CM N/A 06/15/2021    Procedure: LITHOLOPAXY HOLMIUM LASER BLADDER STONE;  Surgeon: Dequan Barrientos MD;  Location: [facility];  Service: Urology    PROSTATE BIOPSY Bilateral 2011    PROSTATE SURGERY      TONSILLECTOMY      UMBILICAL HERNIA REPAIR         Social History     Socioeconomic History    Marital status: /Civil Union     Spouse name: Not on file    Number of children: Not on file    Years of education: Not on file    Highest education level: Not on file   Occupational History    Occupation: manager   Tobacco Use    Smoking status: Former     Current packs/day: 0.00     Average packs/day: 1 pack/day for 5.0 years (5.0 ttl pk-yrs)     Types: Cigarettes     Quit date:      Years since quittin.5    Smokeless tobacco: Never   Vaping Use    Vaping status: Never Used   Substance and Sexual Activity    Alcohol use: Not Currently     Alcohol/week: 1.0 standard drink of alcohol     Types: 1 Cans of beer per week     Comment: beer    Drug use: No    Sexual activity:  Yes     Partners: Female     Birth control/protection: None   Other Topics Concern    Not on file   Social History Narrative    Not on file     Social Determinants of Health     Financial Resource Strain: Low Risk  (12/12/2023)    Overall Financial Resource Strain (CARDIA)     Difficulty of Paying Living Expenses: Not very hard   Food Insecurity: Not on file   Transportation Needs: No Transportation Needs (12/12/2023)    PRAPARE - Transportation     Lack of Transportation (Medical): No     Lack of Transportation (Non-Medical): No   Physical Activity: Not on file   Stress: Not on file   Social Connections: Unknown (6/18/2024)    Received from [employer]    Social [employer]     How often do you feel lonely or isolated from those around you? (Adult - for ages 18 years and over): Not on file   Intimate Partner Violence: Not on file   Housing Stability: Not on file       Family History   Problem Relation Age of Onset    Cancer Family         Bladder cancer    Diabetes Family     Heart attack Family     Hypertension Family     Heart attack Father     Gout Father     Cancer Father     Prostate cancer Father     Hypertension Father     Heart disease Mother     Diabetes Mother     Brain cancer Brother     Diabetes Brother     Diabetes Sister     Diabetes Sister     Diabetes Sister     Diabetes Sister

## 2024-11-17 DIAGNOSIS — J30.1 ALLERGIC RHINITIS DUE TO POLLEN, UNSPECIFIED SEASONALITY: ICD-10-CM

## 2024-11-18 RX ORDER — FLUTICASONE PROPIONATE 50 MCG
SPRAY, SUSPENSION (ML) NASAL
Qty: 24 ML | Refills: 1 | Status: SHIPPED | OUTPATIENT
Start: 2024-11-18

## 2024-11-21 ENCOUNTER — HOSPITAL ENCOUNTER (OUTPATIENT)
Dept: NON INVASIVE DIAGNOSTICS | Facility: HOSPITAL | Age: 69
Discharge: HOME | End: 2024-11-21
Payer: MEDICARE

## 2024-11-21 VITALS
WEIGHT: 219 LBS | HEART RATE: 62 BPM | HEIGHT: 74 IN | DIASTOLIC BLOOD PRESSURE: 82 MMHG | BODY MASS INDEX: 28.11 KG/M2 | SYSTOLIC BLOOD PRESSURE: 120 MMHG

## 2024-11-21 DIAGNOSIS — I21.02 ST ELEVATION MYOCARDIAL INFARCTION INVOLVING LEFT ANTERIOR DESCENDING (LAD) CORONARY ARTERY (HCC): ICD-10-CM

## 2024-11-21 DIAGNOSIS — I25.10 CORONARY ARTERY DISEASE INVOLVING NATIVE CORONARY ARTERY OF NATIVE HEART WITHOUT ANGINA PECTORIS: ICD-10-CM

## 2024-11-21 DIAGNOSIS — I25.5 CARDIOMYOPATHY, ISCHEMIC: ICD-10-CM

## 2024-11-21 LAB
AORTIC ROOT: 3.6 CM
AORTIC VALVE MEAN VELOCITY: 6.5 M/S
APICAL FOUR CHAMBER EJECTION FRACTION: 60 %
ASCENDING AORTA: 4.3 CM
AV AREA BY CONTINUOUS VTI: 3.4 CM2
AV AREA PEAK VELOCITY: 3.3 CM2
AV LVOT MEAN GRADIENT: 1 MMHG
AV LVOT PEAK GRADIENT: 2 MMHG
AV MEAN GRADIENT: 2 MMHG
AV PEAK GRADIENT: 3 MMHG
AV VALVE AREA: 3.41 CM2
AV VELOCITY RATIO: 0.67
BSA FOR ECHO PROCEDURE: 2.26 M2
DOP CALC AO PEAK VEL: 0.93 M/S
DOP CALC AO VTI: 22.68 CM
DOP CALC LVOT AREA: 4.91 CM2
DOP CALC LVOT CARDIAC INDEX: 1.66 L/MIN/M2
DOP CALC LVOT CARDIAC OUTPUT: 3.75 L/MIN
DOP CALC LVOT DIAMETER: 2.5 CM
DOP CALC LVOT PEAK VEL VTI: 15.78 CM
DOP CALC LVOT PEAK VEL: 0.62 M/S
DOP CALC LVOT STROKE INDEX: 34.1 ML/M2
DOP CALC LVOT STROKE VOLUME: 77.42
E WAVE DECELERATION TIME: 253 MS
E/A RATIO: 0.76
FRACTIONAL SHORTENING: 31 (ref 28–44)
INTERVENTRICULAR SEPTUM IN DIASTOLE (PARASTERNAL SHORT AXIS VIEW): 1 CM
INTERVENTRICULAR SEPTUM: 1 CM (ref 0.6–1.1)
LAAS-AP2: 15 CM2
LAAS-AP4: 17 CM2
LEFT ATRIUM SIZE: 4.2 CM
LEFT ATRIUM VOLUME (MOD BIPLANE): 42 ML
LEFT ATRIUM VOLUME INDEX (MOD BIPLANE): 18.6 ML/M2
LEFT INTERNAL DIMENSION IN SYSTOLE: 3.7 CM (ref 2.1–4)
LEFT VENTRICULAR INTERNAL DIMENSION IN DIASTOLE: 5.4 CM (ref 3.5–6)
LEFT VENTRICULAR POSTERIOR WALL IN END DIASTOLE: 0.9 CM
LEFT VENTRICULAR STROKE VOLUME: 86 ML
LVSV (TEICH): 86 ML
MV E'TISSUE VEL-SEP: 8 CM/S
MV PEAK A VEL: 0.66 M/S
MV PEAK E VEL: 50 CM/S
MV STENOSIS PRESSURE HALF TIME: 74 MS
MV VALVE AREA P 1/2 METHOD: 2.97
RIGHT ATRIUM AREA SYSTOLE A4C: 14.1 CM2
RIGHT VENTRICLE ID DIMENSION: 3.3 CM
SINOTUBULAR JUNCTION: 3.7 CM
SL CV LEFT ATRIUM LENGTH A2C: 4.8 CM
SL CV LV EF: 60
SL CV PED ECHO LEFT VENTRICLE DIASTOLIC VOLUME (MOD BIPLANE) 2D: 144 ML
SL CV PED ECHO LEFT VENTRICLE SYSTOLIC VOLUME (MOD BIPLANE) 2D: 58 ML
SL CV SINUS OF VALSALVA 2D: 3.8 CM
STJ: 3.7 CM
TRICUSPID ANNULAR PLANE SYSTOLIC EXCURSION: 2 CM

## 2024-11-21 PROCEDURE — 93306 TTE W/DOPPLER COMPLETE: CPT

## 2024-11-21 PROCEDURE — 93306 TTE W/DOPPLER COMPLETE: CPT | Performed by: INTERNAL MEDICINE

## 2024-11-24 DIAGNOSIS — I21.02 ST ELEVATION MYOCARDIAL INFARCTION INVOLVING LEFT ANTERIOR DESCENDING (LAD) CORONARY ARTERY (HCC): ICD-10-CM

## 2024-11-26 ENCOUNTER — RESULTS FOLLOW-UP (OUTPATIENT)
Age: 69
End: 2024-11-26

## 2024-11-26 RX ORDER — EVOLOCUMAB 140 MG/ML
INJECTION, SOLUTION SUBCUTANEOUS
Qty: 2 ML | Refills: 2 | Status: SHIPPED | OUTPATIENT
Start: 2024-11-26

## 2024-12-12 ENCOUNTER — OFFICE VISIT (OUTPATIENT)
Age: 69
End: 2024-12-12
Payer: MEDICARE

## 2024-12-12 VITALS
WEIGHT: 212.8 LBS | BODY MASS INDEX: 26.46 KG/M2 | SYSTOLIC BLOOD PRESSURE: 120 MMHG | HEIGHT: 75 IN | HEART RATE: 63 BPM | OXYGEN SATURATION: 97 % | TEMPERATURE: 98.4 F | DIASTOLIC BLOOD PRESSURE: 80 MMHG

## 2024-12-12 DIAGNOSIS — Z00.00 MEDICARE ANNUAL WELLNESS VISIT, SUBSEQUENT: ICD-10-CM

## 2024-12-12 DIAGNOSIS — I25.10 CORONARY ARTERY DISEASE INVOLVING NATIVE CORONARY ARTERY OF NATIVE HEART WITHOUT ANGINA PECTORIS: ICD-10-CM

## 2024-12-12 DIAGNOSIS — I26.99 PULMONARY EMBOLISM WITH INFARCTION (HCC): ICD-10-CM

## 2024-12-12 DIAGNOSIS — K40.90 INGUINAL HERNIA WITHOUT OBSTRUCTION OR GANGRENE, RECURRENCE NOT SPECIFIED, UNSPECIFIED LATERALITY: Primary | ICD-10-CM

## 2024-12-12 DIAGNOSIS — Z23 ENCOUNTER FOR IMMUNIZATION: ICD-10-CM

## 2024-12-12 PROBLEM — F11.11 OPIOID USE DISORDER, MILD, IN SUSTAINED REMISSION, ON MAINTENANCE THERAPY (HCC): Status: RESOLVED | Noted: 2022-09-27 | Resolved: 2024-12-12

## 2024-12-12 PROCEDURE — G0439 PPPS, SUBSEQ VISIT: HCPCS | Performed by: FAMILY MEDICINE

## 2024-12-12 PROCEDURE — 90471 IMMUNIZATION ADMIN: CPT

## 2024-12-12 PROCEDURE — 90662 IIV NO PRSV INCREASED AG IM: CPT

## 2024-12-12 PROCEDURE — 99214 OFFICE O/P EST MOD 30 MIN: CPT | Performed by: FAMILY MEDICINE

## 2024-12-12 RX ORDER — ASPIRIN 81 MG/1
81 TABLET ORAL DAILY
Qty: 90 TABLET | Refills: 3 | Status: SHIPPED | OUTPATIENT
Start: 2024-12-12

## 2024-12-12 NOTE — PROGRESS NOTES
Assessment and Plan:     Problem List Items Addressed This Visit          Cardiovascular and Mediastinum    Pulmonary embolism with infarction (HCC)    Relevant Medications    aspirin (Aspirin 81) 81 mg EC tablet    Coronary artery disease involving native coronary artery of native heart without angina pectoris    Relevant Medications    aspirin (Aspirin 81) 81 mg EC tablet       Other    Medicare annual wellness visit, subsequent     Other Visit Diagnoses         Inguinal hernia without obstruction or gangrene, recurrence not specified, unspecified laterality    -  Primary    Relevant Orders    Ambulatory Referral to General Surgery          68 y/o male with: PE, CAD and inguinal hernia along with Medicare AWV. Will continue meds. And refer to surgeon. Discussed supportive care and return parameters.     Regarding Medicare Annual well visit. Discussed various safety and health maintenance issues including healthy diet like the Mediterranean diet, exercise, ample sleep, stress reduction, and healthy weight as tolerated. Discussed supportive care and return parameters.        Preventive health issues were discussed with patient, and age appropriate screening tests were ordered as noted in patient's After Visit Summary.  Personalized health advice and appropriate referrals for health education or preventive services given if needed, as noted in patient's After Visit Summary.     History of Present Illness:     Patient presents for a Medicare Wellness Visit    Patient is a 68 y/o male who presents for follow-up on PE, CAD and inguinal hernia. No fevers chills nausea or vomiting, no CP or SOB. Pt also here for Medicare AWV admits being active eats and sleeps well.       Patient Care Team:  Kenan Gracia MD as PCP - General (Family Medicine)  MD Romario Smith MD     Review of Systems:     Review of Systems   Constitutional: Negative.    HENT: Negative.     Eyes: Negative.    Respiratory:  Negative.     Cardiovascular: Negative.    Gastrointestinal: Negative.    Endocrine: Negative.    Genitourinary: Negative.    Musculoskeletal: Negative.    Allergic/Immunologic: Negative.    Neurological: Negative.    Hematological: Negative.    Psychiatric/Behavioral: Negative.     All other systems reviewed and are negative.     Problem List:     Patient Active Problem List   Diagnosis    Malignant neoplasm of prostate (HCC)    Abnormal glucose level    Primary osteoarthritis of both knees    Adenocarcinoma of prostate (HCC)    Arthropathy    Cardiomyopathy, ischemic    Chest pain    Chronic bilateral low back pain without sciatica    Essential hypertension    Hx pulmonary embolism    Hyperlipidemia    Primary osteoarthritis of both hands    Pulmonary embolism with infarction (HCC)    Sclerosing mesenteritis (HCC)    STEMI (ST elevation myocardial infarction) (HCC)    Toxic myopathy    Coronary artery disease involving native coronary artery of native heart without angina pectoris    Other male erectile dysfunction    BPH with urinary obstruction    Status post primary angioplasty with coronary stent    Urgency of urination    Bladder stone    HLD (hyperlipidemia)    Statin intolerance    Elevated PSA    Benign prostatic hyperplasia    History of bladder stone    Prediabetes    BPH (benign prostatic hyperplasia)    Chronic inflammatory arthritis    Medicare annual wellness visit, subsequent      Past Medical and Surgical History:     Past Medical History:   Diagnosis Date    Acute ST elevation myocardial infarction (STEMI) involving left anterior descending (LAD) coronary artery (HCC) 09/08/2016    Arthritis     low back and fingers    BCC (basal cell carcinoma of skin)     left shoulder and nose in past    Bladder stone     BPH with urinary obstruction     Coronary artery disease      2 stents    Elevated PSA     Erectile dysfunction     Gout     History of colonoscopy 01/19/2018    History of DVT in adulthood      RLE    Hypercholesteremia     Hypertension     Malignant neoplasm prostate (HCC)     Prediabetes     Pulmonary embolism (HCC)     Right inguinal hernia     Sclerosing mesenteritis (HCC)     Seasonal allergies     Wears glasses      Past Surgical History:   Procedure Laterality Date    COLONOSCOPY      CORONARY STENT PLACEMENT      CYSTOSCOPY N/A 06/15/2021    Procedure: CYSTOSCOPY;  Surgeon: Dequan Barrientos MD;  [facility];  Service: Urology    [place] LITHOLAPAXY COMP/LG > 2.5 CM N/A 06/15/2021    Procedure: LITHOLOPAXY HOLMIUM LASER BLADDER STONE;  Surgeon: Dequan Barrientos MD;  Location[facility];  Service: Urology    PROSTATE BIOPSY Bilateral 2011    PROSTATE SURGERY      TONSILLECTOMY      UMBILICAL HERNIA REPAIR        Family History:     Family History   Problem Relation Age of Onset    Cancer Family         Bladder cancer    Diabetes Family     Heart attack Family     Hypertension Family     Heart attack Father     Gout Father     Cancer Father     Prostate cancer Father     Hypertension Father     Heart disease Mother     Diabetes Mother     Brain cancer Brother     Diabetes Brother     Diabetes Sister     Diabetes Sister     Diabetes Sister     Diabetes Sister       Social History:     Social History     Socioeconomic History    Marital status: /Civil Union     Spouse name: None    Number of children: None    Years of education: None    Highest education level: None   Occupational History    Occupation: manager   Tobacco Use    Smoking status: Former     Current packs/day: 0.00     Average packs/day: 1 pack/day for 5.0 years (5.0 ttl pk-yrs)     Types: Cigarettes     Quit date:      Years since quittin.9    Smokeless tobacco: Never   Vaping Use    Vaping status: Never Used   Substance and Sexual Activity    Alcohol use: Not Currently     Alcohol/week: 1.0 standard drink of alcohol     Types: 1 Cans of beer per week     Comment: beer    Drug use: No    Sexual activity: Yes     Partners:  Female     Birth control/protection: None   Other Topics Concern    None   Social History Narrative    None     Social Drivers of Health     Financial Resource Strain: Low Risk  (12/12/2023)    Overall Financial Resource Strain (CARDIA)     Difficulty of Paying Living Expenses: Not very hard   Food Insecurity: Not on file   Transportation Needs: No Transportation Needs (12/12/2023)    PRAPARE - Transportation     Lack of Transportation (Medical): No     Lack of Transportation (Non-Medical): No   Physical Activity: Not on file   Stress: Not on file   Social Connections: Unknown (6/18/2024)    Received from [employer]    Social [employer]     How often do you feel lonely or isolated from those around you? (Adult - for ages 18 years and over): Not on file   Intimate Partner Violence: Not on file   Housing Stability: Not on file      Medications and Allergies:     Current Outpatient Medications   Medication Sig Dispense Refill    aspirin (Aspirin 81) 81 mg EC tablet Take 1 tablet (81 mg total) by mouth daily 90 tablet 3    Ascorbic Acid (vitamin C) 1000 MG tablet Take 1,000 mg by mouth daily      Cholecalciferol 25 MCG (1000 UT) tablet Take 1,000 Units by mouth daily      clopidogrel (PLAVIX) 75 mg tablet TAKE 1 TABLET BY MOUTH EVERY DAY 90 tablet 1    Evolocumab (Repatha SureClick) 140 MG/ML SOAJ INJECT 1 ML (140 MG) SUBCUTANEOUSLY EVERY 14 DAYS 2 mL 2    fluorouracil (EFUDEX) 5 % cream PLEASE SEE ATTACHED FOR DETAILED DIRECTIONS (Patient not taking: Reported on 5/17/2022)      fluticasone (FLONASE) 50 mcg/act nasal spray SPRAY 1 SPRAY INTO EACH NOSTRIL EVERY DAY 24 mL 1    loratadine (CLARITIN) 10 mg tablet Take 10 mg by mouth daily       Melatonin ER 10 MG TBCR Take 10 mg by mouth As needed      metoprolol succinate (TOPROL-XL) 25 mg 24 hr tablet Take 25 mg by mouth every evening       multivitamin-iron-minerals-folic acid (CENTRUM) chewable tablet Chew 1 tablet daily      naloxone (NARCAN) 4 mg/0.1 mL nasal spray  Administer 1 spray into a nostril. If no response after 2-3 minutes, give another dose in the other nostril using a new spray. 1 each 1    omega-3-acid ethyl esters (LOVAZA) 1 g capsule TAKE 2 CAPSULES (2 G TOTAL) BY MOUTH IN THE MORNING AND 2 CAPSULES (2 G TOTAL) IN THE EVENING. 360 capsule 4    tadalafil (CIALIS) 20 MG tablet Take 1 tablet (20 mg total) by mouth daily as needed for erectile dysfunction (Patient taking differently: Take 20 mg by mouth daily as needed for erectile dysfunction As needed) 10 tablet 3    traMADol (ULTRAM) 50 mg tablet Take 2 tablets (100 mg total) by mouth daily as needed for moderate pain 60 tablet 1     Current Facility-Administered Medications   Medication Dose Route Frequency Provider Last Rate Last Admin    ropivacaine (NAROPIN) injection 4 mL  4 mL Intra-articular Titrated    4 mL at 07/09/24 0834     Allergies   Allergen Reactions    No Active Allergies       Immunizations:     Immunization History   Administered Date(s) Administered    COVID-19 PFIZER VACCINE 0.3 ML IM 03/21/2021, 04/11/2021, 01/15/2022    COVID-19 Pfizer vac (Robert-sucrose, gray cap) 12 yr+ IM 08/04/2022    Fluzone Split Quad 0.5 mL 10/13/2016    INFLUENZA 10/13/2016, 11/09/2018, 08/21/2021, 12/16/2022    Influenza, high dose seasonal 0.7 mL 12/16/2022, 12/12/2023    Influenza, injectable, quadrivalent, preservative free 0.5 mL 11/09/2018    Pneumococcal Conjugate Vaccine 20-valent (Pcv20), Polysace 12/12/2023      Health Maintenance:         Topic Date Due    Colorectal Cancer Screening  01/19/2028    Hepatitis C Screening  Completed         Topic Date Due    Hepatitis A Vaccine (1 of 2 - Risk 2-dose series) Never done    Influenza Vaccine (1) 09/01/2024    COVID-19 Vaccine (5 - 2024-25 season) 09/01/2024      Medicare Screening Tests and Risk Assessments:     Chad is here for his Subsequent Wellness visit. Last Medicare Wellness visit information reviewed, patient interviewed and updates made to the record  today.      Health Risk Assessment:   Patient rates overall health as good. Patient feels that their physical health rating is same. Patient is satisfied with their life. Eyesight was rated as same. Hearing was rated as same. Patient feels that their emotional and mental health rating is same. Patients states they are never, rarely angry. Patient states they are never, rarely unusually tired/fatigued. Pain experienced in the last 7 days has been some. Patient's pain rating has been 1/10. Patient states that he has experienced no weight loss or gain in last 6 months.     Fall Risk Screening:   In the past year, patient has experienced: no history of falling in past year      Home Safety:  Patient does not have trouble with stairs inside or outside of their home. Patient has working smoke alarms and has working carbon monoxide detector. Home safety hazards include: none.     Nutrition:   Current diet is Regular.     Medications:   Patient is able to manage medications.     Activities of Daily Living (ADLs)/Instrumental Activities of Daily Living (IADLs):   Walk and transfer into and out of bed and chair?: Yes  Dress and groom yourself?: Yes    Bathe or shower yourself?: Yes    Feed yourself? Yes  Do your laundry/housekeeping?: Yes  Manage your money, pay your bills and track your expenses?: Yes  Make your own meals?: Yes    Do your own shopping?: Yes    Previous Hospitalizations:   Any hospitalizations or ED visits within the last 12 months?: No      Advance Care Planning:   Living will: No    Durable POA for healthcare: No    Advanced directive: No      Cognitive Screening:   Provider or family/friend/caregiver concerned regarding cognition?: No    PREVENTIVE SCREENINGS      Cardiovascular Screening:    General: Screening Not Indicated and History Lipid Disorder      Diabetes Screening:     General: Screening Current      Colorectal Cancer Screening:     General: Screening Current      Prostate Cancer Screening:     General: History Prostate Cancer      Osteoporosis Screening:    General: Screening Not Indicated      Abdominal Aortic Aneurysm (AAA) Screening:    Risk factors include: age between 65-76 yo and tobacco use        Lung Cancer Screening:     General: Screening Not Indicated      Hepatitis C Screening:    General: Screening Current    Screening, Brief Intervention, and Referral to Treatment (SBIRT)    Screening      Single Item Drug Screening:  How often have you used an illegal drug (including marijuana) or a prescription medication for non-medical reasons in the past year? never    Single Item Drug Screen Score: 0  Interpretation: Negative screen for possible drug use disorder    No results found.     Physical Exam:     There were no vitals taken for this visit.    Physical Exam  Vitals reviewed.   Constitutional:       General: He is not in acute distress.     Appearance: He is well-developed. He is not diaphoretic.   HENT:      Head: Normocephalic and atraumatic.      Right Ear: External ear normal.      Left Ear: External ear normal.      Nose: Nose normal.   Eyes:      General: No scleral icterus.        Right eye: No discharge.         Left eye: No discharge.      Conjunctiva/sclera: Conjunctivae normal.      Pupils: Pupils are equal, round, and reactive to light.   Neck:      Thyroid: No thyromegaly.      Trachea: No tracheal deviation.   Cardiovascular:      Rate and Rhythm: Normal rate and regular rhythm.      Heart sounds: Normal heart sounds. No murmur heard.     No friction rub.   Pulmonary:      Effort: Pulmonary effort is normal. No respiratory distress.      Breath sounds: Normal breath sounds. No stridor. No wheezing or rales.   Abdominal:      General: There is no distension.      Palpations: Abdomen is soft. There is no mass.      Tenderness: There is no abdominal tenderness. There is no guarding or rebound.   Musculoskeletal:         General: Normal range of motion.      Cervical back: Normal  range of motion and neck supple.   Lymphadenopathy:      Cervical: No cervical adenopathy.   Skin:     General: Skin is warm.   Neurological:      Mental Status: He is alert and oriented to person, place, and time.      Cranial Nerves: No cranial nerve deficit.   Psychiatric:         Behavior: Behavior normal.         Thought Content: Thought content normal.         Judgment: Judgment normal.          Kenan Garcia MD

## 2024-12-18 ENCOUNTER — TELEPHONE (OUTPATIENT)
Age: 69
End: 2024-12-18

## 2024-12-18 DIAGNOSIS — I25.5 CARDIOMYOPATHY, ISCHEMIC: Primary | ICD-10-CM

## 2024-12-18 DIAGNOSIS — N52.9 ERECTILE DYSFUNCTION, UNSPECIFIED ERECTILE DYSFUNCTION TYPE: Primary | ICD-10-CM

## 2024-12-18 RX ORDER — METOPROLOL SUCCINATE 25 MG/1
25 TABLET, EXTENDED RELEASE ORAL
Qty: 90 TABLET | Refills: 0 | Status: SHIPPED | OUTPATIENT
Start: 2024-12-18

## 2024-12-23 ENCOUNTER — OFFICE VISIT (OUTPATIENT)
Dept: OBGYN CLINIC | Facility: MEDICAL CENTER | Age: 69
End: 2024-12-23
Payer: MEDICARE

## 2024-12-23 VITALS — BODY MASS INDEX: 26.36 KG/M2 | HEIGHT: 75 IN | WEIGHT: 212 LBS

## 2024-12-23 DIAGNOSIS — M25.561 CHRONIC PAIN OF BOTH KNEES: Primary | ICD-10-CM

## 2024-12-23 DIAGNOSIS — M25.562 CHRONIC PAIN OF BOTH KNEES: Primary | ICD-10-CM

## 2024-12-23 DIAGNOSIS — M17.0 PRIMARY OSTEOARTHRITIS OF BOTH KNEES: ICD-10-CM

## 2024-12-23 DIAGNOSIS — G89.29 CHRONIC PAIN OF BOTH KNEES: Primary | ICD-10-CM

## 2024-12-23 PROCEDURE — 20610 DRAIN/INJ JOINT/BURSA W/O US: CPT | Performed by: EMERGENCY MEDICINE

## 2024-12-23 PROCEDURE — 99212 OFFICE O/P EST SF 10 MIN: CPT | Performed by: EMERGENCY MEDICINE

## 2024-12-23 RX ORDER — ROPIVACAINE HYDROCHLORIDE 2 MG/ML
4 INJECTION, SOLUTION EPIDURAL; INFILTRATION; PERINEURAL
Status: COMPLETED | OUTPATIENT
Start: 2024-12-23 | End: 2024-12-23

## 2024-12-23 RX ORDER — TRIAMCINOLONE ACETONIDE 40 MG/ML
40 INJECTION, SUSPENSION INTRA-ARTICULAR; INTRAMUSCULAR
Status: COMPLETED | OUTPATIENT
Start: 2024-12-23 | End: 2024-12-23

## 2024-12-23 RX ADMIN — ROPIVACAINE HYDROCHLORIDE 4 ML: 2 INJECTION, SOLUTION EPIDURAL; INFILTRATION; PERINEURAL at 16:30

## 2024-12-23 RX ADMIN — TRIAMCINOLONE ACETONIDE 40 MG: 40 INJECTION, SUSPENSION INTRA-ARTICULAR; INTRAMUSCULAR at 16:30

## 2024-12-23 NOTE — PROGRESS NOTES
Assessment/Plan:    Diagnoses and all orders for this visit:    Chronic pain of both knees  -     Large joint arthrocentesis: bilateral knee  -     ropivacaine (NAROPIN) injection 4 mL  -     ropivacaine (NAROPIN) injection 4 mL  -     triamcinolone acetonide (Kenalog-40) 40 mg/mL injection 40 mg  -     triamcinolone acetonide (Kenalog-40) 40 mg/mL injection 40 mg    Primary osteoarthritis of both knees  -     Large joint arthrocentesis: bilateral knee  -     ropivacaine (NAROPIN) injection 4 mL  -     ropivacaine (NAROPIN) injection 4 mL  -     triamcinolone acetonide (Kenalog-40) 40 mg/mL injection 40 mg  -     triamcinolone acetonide (Kenalog-40) 40 mg/mL injection 40 mg    Other orders  -     Cancel: Large joint arthrocentesis    Repeat CSIs B/L knees    Return in about 3 months (around 3/23/2025).      Subjective:   Patient ID: Chad Taylor is a 69 y.o. male.    Lluvia returns to the office requesting bilateral knee steroid injections.  Last evaluation in July he experienced significant benefit        Review of Systems    The following portions of the patient's chart were reviewed and updated as appropriate:   Allergy:    Allergies   Allergen Reactions    No Active Allergies        Medications:    Current Outpatient Medications:     Ascorbic Acid (vitamin C) 1000 MG tablet, Take 1,000 mg by mouth daily, Disp: , Rfl:     aspirin (Aspirin 81) 81 mg EC tablet, Take 1 tablet (81 mg total) by mouth daily, Disp: 90 tablet, Rfl: 3    Cholecalciferol 25 MCG (1000 UT) tablet, Take 1,000 Units by mouth daily, Disp: , Rfl:     clopidogrel (PLAVIX) 75 mg tablet, TAKE 1 TABLET BY MOUTH EVERY DAY, Disp: 90 tablet, Rfl: 1    Evolocumab (Repatha SureClick) 140 MG/ML SOAJ, INJECT 1 ML (140 MG) SUBCUTANEOUSLY EVERY 14 DAYS, Disp: 2 mL, Rfl: 2    fluticasone (FLONASE) 50 mcg/act nasal spray, SPRAY 1 SPRAY INTO EACH NOSTRIL EVERY DAY, Disp: 24 mL, Rfl: 1    loratadine (CLARITIN) 10 mg tablet, Take 10 mg by mouth daily ,  Disp: , Rfl:     Melatonin ER 10 MG TBCR, Take 10 mg by mouth As needed, Disp: , Rfl:     metoprolol succinate (TOPROL-XL) 25 mg 24 hr tablet, TAKE 1 TABLET BY MOUTH DAILY WITH DINNER, Disp: 90 tablet, Rfl: 0    multivitamin-iron-minerals-folic acid (CENTRUM) chewable tablet, Chew 1 tablet daily, Disp: , Rfl:     naloxone (NARCAN) 4 mg/0.1 mL nasal spray, Administer 1 spray into a nostril. If no response after 2-3 minutes, give another dose in the other nostril using a new spray., Disp: 1 each, Rfl: 1    omega-3-acid ethyl esters (LOVAZA) 1 g capsule, TAKE 2 CAPSULES (2 G TOTAL) BY MOUTH IN THE MORNING AND 2 CAPSULES (2 G TOTAL) IN THE EVENING., Disp: 360 capsule, Rfl: 4    tadalafil (CIALIS) 20 MG tablet, Take 1 tablet (20 mg total) by mouth daily as needed for erectile dysfunction, Disp: 10 tablet, Rfl: 3    traMADol (ULTRAM) 50 mg tablet, Take 2 tablets (100 mg total) by mouth daily as needed for moderate pain, Disp: 60 tablet, Rfl: 1    fluorouracil (EFUDEX) 5 % cream, PLEASE SEE ATTACHED FOR DETAILED DIRECTIONS (Patient not taking: Reported on 12/12/2024), Disp: , Rfl:     Current Facility-Administered Medications:     ropivacaine (NAROPIN) injection 4 mL, 4 mL, Intra-articular, Titrated, , 4 mL at 07/09/24 0834    Patient Active Problem List   Diagnosis    Malignant neoplasm of prostate (HCC)    Abnormal glucose level    Primary osteoarthritis of both knees    Adenocarcinoma of prostate (HCC)    Arthropathy    Cardiomyopathy, ischemic    Chest pain    Chronic bilateral low back pain without sciatica    Essential hypertension    Hx pulmonary embolism    Hyperlipidemia    Primary osteoarthritis of both hands    Pulmonary embolism with infarction (HCC)    Sclerosing mesenteritis (HCC)    STEMI (ST elevation myocardial infarction) (HCC)    Toxic myopathy    Coronary artery disease involving native coronary artery of native heart without angina pectoris    Other male erectile dysfunction    BPH with urinary  "obstruction    Status post primary angioplasty with coronary stent    Urgency of urination    Bladder stone    HLD (hyperlipidemia)    Statin intolerance    Elevated PSA    Benign prostatic hyperplasia    History of bladder stone    Prediabetes    BPH (benign prostatic hyperplasia)    Chronic inflammatory arthritis    Medicare annual wellness visit, subsequent       Objective:  Ht 6' 2.5\" (1.892 m)   Wt 96.2 kg (212 lb)   BMI 26.86 kg/m²     Ortho Exam    Physical Exam      Neurologic Exam    Large joint arthrocentesis: bilateral knee  Universal Protocol:  Consent: Verbal consent obtained.  Risks and benefits: risks, benefits and alternatives were discussed  Consent given by: patient  Time out: Immediately prior to procedure a \"time out\" was called to verify the correct patient, procedure, equipment, support staff and site/side marked as required.  Timeout called at: 12/23/2024 4:39 PM.  Patient understanding: patient states understanding of the procedure being performed  Test results: test results available and properly labeled  Site marked: the operative site was marked  Patient identity confirmed: verbally with patient  Supporting Documentation  Indications: pain   Procedure Details  Location: knee - bilateral knee  Preparation: Patient was prepped and draped in the usual sterile fashion  Needle size: 22 G  Ultrasound guidance: no  Approach: anterolateral    Medications (Right): 4 mL ropivacaine 0.2 %; 40 mg triamcinolone acetonide 40 mg/mLMedications (Left): 4 mL ropivacaine 0.2 %; 40 mg triamcinolone acetonide 40 mg/mL   Patient tolerance: patient tolerated the procedure well with no immediate complications  Dressing:  Sterile dressing applied    No erythema of knee(s)          I have personally reviewed the written report of the pertinent studies.             Past Medical History:   Diagnosis Date    Acute ST elevation myocardial infarction (STEMI) involving left anterior descending (LAD) coronary artery " (HCC) 2016    Arthritis     low back and fingers    BCC (basal cell carcinoma of skin)     left shoulder and nose in past    Bladder stone     BPH with urinary obstruction     Coronary artery disease      2 stents    Elevated PSA     Erectile dysfunction     Gout     History of colonoscopy 2018    History of DVT in adulthood     RLE    Hypercholesteremia     Hypertension     Malignant neoplasm prostate (HCC)     Prediabetes     Pulmonary embolism (HCC)     Right inguinal hernia     Sclerosing mesenteritis (HCC)     Seasonal allergies     Wears glasses        Past Surgical History:   Procedure Laterality Date    COLONOSCOPY      CORONARY STENT PLACEMENT      CYSTOSCOPY N/A 06/15/2021    Procedure: CYSTOSCOPY;  Surgeon: Dequan Barrientos MD;  Location: [facility];  Service: Urology    DE LITHOLAPAXY COMP/LG > 2.5 CM N/A 06/15/2021    Procedure: LITHOLOPAXY HOLMIUM LASER BLADDER STONE;  Surgeon: Dequan Barrientos MD;  Location[facility];  Service: Urology    PROSTATE BIOPSY Bilateral 2011    PROSTATE SURGERY      TONSILLECTOMY      UMBILICAL HERNIA REPAIR         Social History     Socioeconomic History    Marital status: /Civil Union     Spouse name: Not on file    Number of children: Not on file    Years of education: Not on file    Highest education level: Not on file   Occupational History    Occupation: manager   Tobacco Use    Smoking status: Former     Current packs/day: 0.00     Average packs/day: 1 pack/day for 5.0 years (5.0 ttl pk-yrs)     Types: Cigarettes     Quit date:      Years since quittin.0    Smokeless tobacco: Never   Vaping Use    Vaping status: Never Used   Substance and Sexual Activity    Alcohol use: Not Currently     Alcohol/week: 1.0 standard drink of alcohol     Types: 1 Cans of beer per week     Comment: beer    Drug use: No    Sexual activity: Yes     Partners: Female     Birth control/protection: None   Other Topics Concern    Not on file   Social History Narrative     Not on file     Social Drivers of Health     Financial Resource Strain: Low Risk  (12/12/2023)    Overall Financial Resource Strain (CARDIA)     Difficulty of Paying Living Expenses: Not very hard   Food Insecurity: Not on file   Transportation Needs: No Transportation Needs (12/12/2023)    PRAPARE - Transportation     Lack of Transportation (Medical): No     Lack of Transportation (Non-Medical): No   Physical Activity: Not on file   Stress: Not on file   Social Connections: Unknown (6/18/2024)    Received from [employer]    Social Connections     How often do you feel lonely or isolated from those around you? (Adult - for ages 18 years and over): Not on file   Intimate Partner Violence: Not on file   Housing Stability: Not on file       Family History   Problem Relation Age of Onset    Cancer Family         Bladder cancer    Diabetes Family     Heart attack Family     Hypertension Family     Heart attack Father     Gout Father     Cancer Father     Prostate cancer Father     Hypertension Father     Heart disease Mother     Diabetes Mother     Brain cancer Brother     Diabetes Brother     Diabetes Sister     Diabetes Sister     Diabetes Sister     Diabetes Sister         clear bilaterally.  Pupils equal, round, and reactive to light.

## 2024-12-30 ENCOUNTER — TELEPHONE (OUTPATIENT)
Age: 69
End: 2024-12-30

## 2025-01-11 PROBLEM — Z00.00 MEDICARE ANNUAL WELLNESS VISIT, SUBSEQUENT: Status: RESOLVED | Noted: 2024-12-12 | Resolved: 2025-01-11

## 2025-01-21 DIAGNOSIS — J30.1 ALLERGIC RHINITIS DUE TO POLLEN, UNSPECIFIED SEASONALITY: ICD-10-CM

## 2025-01-21 RX ORDER — FLUTICASONE PROPIONATE 50 MCG
SPRAY, SUSPENSION (ML) NASAL
Qty: 24 ML | Refills: 1 | Status: SHIPPED | OUTPATIENT
Start: 2025-01-21

## 2025-01-28 DIAGNOSIS — I21.02 ST ELEVATION MYOCARDIAL INFARCTION INVOLVING LEFT ANTERIOR DESCENDING (LAD) CORONARY ARTERY (HCC): ICD-10-CM

## 2025-01-28 RX ORDER — EVOLOCUMAB 140 MG/ML
INJECTION, SOLUTION SUBCUTANEOUS
Qty: 2 ML | Refills: 5 | Status: SHIPPED | OUTPATIENT
Start: 2025-01-28

## 2025-01-28 NOTE — TELEPHONE ENCOUNTER
Reason for call:   [x] Refill   [] Prior Auth  [] Other:     Office:   [x] PCP/Provider - Kenan Garcia MD   [] Specialty/Provider -     Medication: Evolocumab (Repatha SureClick) 140 MG/ML SOAJ /  INJECT 1 ML (140 MG) SUBCUTANEOUSLY EVERY 14 DAYS     Pharmacy: [facility] #[id number] - [place], [place] - [address] [place] [address]     Does the patient have enough for 3 days?   [] Yes   [x] No - Send as HP to POD

## 2025-01-31 ENCOUNTER — TELEPHONE (OUTPATIENT)
Age: 70
End: 2025-01-31

## 2025-01-31 NOTE — TELEPHONE ENCOUNTER
PA for Evolocumab 140MG/ML APPROVED     Date(s) approved 01/01/2025-01/31/2026    Case #[id number]     Patient advised by          [x]Run My Errandshart Message  []Phone call   []LMOM  []L/M to call office as no active Communication consent on file  [x]Unable to leave detailed message as VM not approved on Communication consent       Pharmacy advised by    [x]Fax  []Phone call    Approval letter scanned into Media No waiting for letter

## 2025-01-31 NOTE — TELEPHONE ENCOUNTER
PA for Evolocumab 140MG/ML SUBMITTED to [employer]    via    []CMM-KEY:   [x][employer]-Case ID # [id number]   []Availity-Auth ID # NDC #   []Faxed to plan   []Other website   []Phone call Case ID #     [x]PA sent as URGENT    All office notes, labs and other pertaining documents and studies sent. Clinical questions answered. Awaiting determination from insurance company.     Turnaround time for your insurance to make a decision on your Prior Authorization can take 7-21 business days.

## 2025-02-04 DIAGNOSIS — J30.1 ALLERGIC RHINITIS DUE TO POLLEN, UNSPECIFIED SEASONALITY: ICD-10-CM

## 2025-02-05 RX ORDER — FLUTICASONE PROPIONATE 50 MCG
SPRAY, SUSPENSION (ML) NASAL
Qty: 48 ML | Refills: 1 | Status: SHIPPED | OUTPATIENT
Start: 2025-02-05

## 2025-03-20 DIAGNOSIS — I25.5 CARDIOMYOPATHY, ISCHEMIC: ICD-10-CM

## 2025-03-20 RX ORDER — METOPROLOL SUCCINATE 25 MG/1
25 TABLET, EXTENDED RELEASE ORAL
Qty: 90 TABLET | Refills: 1 | Status: SHIPPED | OUTPATIENT
Start: 2025-03-20

## 2025-03-24 ENCOUNTER — OFFICE VISIT (OUTPATIENT)
Dept: OBGYN CLINIC | Facility: MEDICAL CENTER | Age: 70
End: 2025-03-24
Payer: MEDICARE

## 2025-03-24 VITALS — HEIGHT: 75 IN | BODY MASS INDEX: 26.73 KG/M2 | WEIGHT: 215 LBS

## 2025-03-24 DIAGNOSIS — M17.0 PRIMARY OSTEOARTHRITIS OF BOTH KNEES: ICD-10-CM

## 2025-03-24 DIAGNOSIS — M25.562 CHRONIC PAIN OF BOTH KNEES: Primary | ICD-10-CM

## 2025-03-24 DIAGNOSIS — M25.561 CHRONIC PAIN OF BOTH KNEES: Primary | ICD-10-CM

## 2025-03-24 DIAGNOSIS — G89.29 CHRONIC PAIN OF BOTH KNEES: Primary | ICD-10-CM

## 2025-03-24 PROCEDURE — 20610 DRAIN/INJ JOINT/BURSA W/O US: CPT | Performed by: EMERGENCY MEDICINE

## 2025-03-24 PROCEDURE — 99213 OFFICE O/P EST LOW 20 MIN: CPT | Performed by: EMERGENCY MEDICINE

## 2025-03-24 RX ORDER — ROPIVACAINE HYDROCHLORIDE 2 MG/ML
4 INJECTION, SOLUTION EPIDURAL; INFILTRATION; PERINEURAL
Status: COMPLETED | OUTPATIENT
Start: 2025-03-24 | End: 2025-03-24

## 2025-03-24 RX ORDER — TRIAMCINOLONE ACETONIDE 40 MG/ML
40 INJECTION, SUSPENSION INTRA-ARTICULAR; INTRAMUSCULAR
Status: COMPLETED | OUTPATIENT
Start: 2025-03-24 | End: 2025-03-24

## 2025-03-24 RX ADMIN — ROPIVACAINE HYDROCHLORIDE 4 ML: 2 INJECTION, SOLUTION EPIDURAL; INFILTRATION; PERINEURAL at 15:30

## 2025-03-24 RX ADMIN — TRIAMCINOLONE ACETONIDE 40 MG: 40 INJECTION, SUSPENSION INTRA-ARTICULAR; INTRAMUSCULAR at 15:30

## 2025-03-24 NOTE — PATIENT INSTRUCTIONS
You may take Tylenol 500mg every 4-6 hours as needed OR max 1,000mg per dose up to 3 times per day for a total of 3,000mg per day      In order to minimize further degenerative changes and pain from arthritis we recommend maintaining an active lifestyle and continually trying to maintain a healthy weight / BMI (Body Mass Index).  If you are interested we can refer you to Weight Management to help with weight loss.  I recommended avoiding any tobacco use.  On rainy days and cold days you may have worsening pain than baseline.    [email or web address] for more information     Steroid Joint Injection   AMBULATORY CARE:   What you need to know about steroid joint injection:  A steroid joint injection is a procedure to inject steroid medicine into a joint. Steroid medicine decreases pain and inflammation. The injection may also contain an anesthetic (numbing medicine) to decrease pain. It may be done to treat conditions such as arthritis, gout, or carpal tunnel syndrome. The injections may be given in your knee, ankle, shoulder, elbow, wrist, or ankle.   How to prepare for steroid joint injection:  Your healthcare provider will talk to you about how to prepare for this procedure. He will tell you what medicines to take or not take on the day of your procedure. You may need to stop taking blood thinners several days before your procedure.   What will happen during steroid joint injection:  You may be given local anesthesia to numb the area where the injection will be given. With local anesthesia, you may still feel pressure during the procedure, but you should not feel any pain. Your healthcare provider may use ultrasound or fluoroscopy (a type of x-ray) to guide the needle to the right area. He will then inject the steroid into your joint. A bandage will be placed on the injection site.   What will happen after steroid joint injection:  You may have redness, warmth, or sweating in your face and chest right after the  steroid injection. Steroids can affect blood sugar levels. If you have diabetes, you should check your blood sugars closely in the first 24 hours after your procedure.   Risks of steroid joint injection:  You may get an infection in your joint. The injection may also cause more pain during the first 24 to 36 hours. You may need more than one injection to feel pain relief. The skin near the injection site may be damaged and become discolored or indented. This can happen if the steroid is placed too close to your skin. A tendon near the injection site may rupture or a nerve can be damaged.  Contact your healthcare provider if:   You have fever or chills.     You have redness or swelling in the injection site.     You have more pain than usual in your joint for more than 72 hours.     You have questions or concerns about your condition or care.  Medicines:   Pain medicine  may be given. Ask how to take this medicine safely.     Take your medicine as directed.  Contact your healthcare provider if you think your medicine is not helping or if you have side effects. Tell him or her if you are allergic to any medicine. Keep a list of the medicines, vitamins, and herbs you take. Include the amounts, and when and why you take them. Bring the list or the pill bottles to follow-up visits. Carry your medicine list with you in case of an emergency.  Self-care:   Leave the bandage on for 8 to 12 hours.  Care for your wound as directed.    Rest the area  as directed. You may need to decrease weight on certain joints, such as the knee, for a period of time. Ask when you can return to your daily activities.     Elevate  your limb where the steroid injection was given. Elevate the limb above the level of your heart as often as you can. This will help decrease swelling and pain. Prop your limb on pillows or blankets to keep it elevated comfortably.     Apply ice  on your joint for 15 to 20 minutes every hour or as directed. Use an ice  pack, or put crushed ice in a plastic bag. Cover it with a towel. Ice helps prevent tissue damage and decreases swelling and pain.  Follow up with your healthcare provider as directed:  Write down your questions so you remember to ask them during your visits.   © 2017 [employer] Information is for End User's use only and may not be sold, redistributed or otherwise used for commercial purposes. All illustrations and images included in CareNotes® are the copyrighted property of [employer] or [employer].  The above information is an  only. It is not intended as medical advice for individual conditions or treatments. Talk to your doctor, nurse or pharmacist before following any medical regimen to see if it is safe and effective for you.      Arthritis   AMBULATORY CARE:   Arthritis  is a disease that causes inflammation in one or more joints. There are many types of arthritis, such as osteoarthritis, rheumatoid arthritis, and septic arthritis. Some types cause inflammation in the joints. Other types wear away the cartilage between joints. This makes the bones of the joint rub together when you move the joint. Your symptoms may be constant, or symptoms may come and go. Arthritis often gets worse over time and can cause permanent joint damage.  Common signs and symptoms of arthritis:   Pain, swelling, or stiffness in the joint    Limited range of motion in the joint    Warmth or redness over the joint    Tenderness when you touch the joint    Stiff joints in the morning that loosen with movement    A creaking or grinding sound when you move the joint    Fever  Seek care immediately if:   You have a fever and severe joint pain or swelling.     You cannot move the affected joint.     You have severe joint pain you cannot tolerate.  Contact your healthcare provider if:   Your pain or swelling does not get better with treatment.    You have questions or concerns about your  condition or care.  Treatment  will depend on the type of arthritis you have and if it is severe. You may need any of the following:  Acetaminophen  decreases pain and fever. It is available without a doctor's order. Ask how much to take and how often to take it. Follow directions. Acetaminophen can cause liver damage if not taken correctly.    NSAIDs , such as ibuprofen, help decrease swelling, pain, and fever. This medicine is available with or without a doctor's order. NSAIDs can cause stomach bleeding or kidney problems in certain people. If you take blood thinner medicine, always ask your healthcare provider if NSAIDs are safe for you. Always read the medicine label and follow directions.    Steroid medicine  helps reduce swelling and pain.     Surgery  may be needed to repair or replace a damaged joint.  Manage arthritis:   Rest your painful joint so it can heal.  Your healthcare provider may recommend crutches or a walker if the affected joint is in a leg.     Apply ice or heat to the joint.  Both can help decrease swelling and pain. Ice may also help prevent tissue damage. Use an ice pack, or put crushed ice in a plastic bag. Cover it with a towel and place it on your joint for 15 to 20 minutes every hour or as directed. You can apply heat for 20 minutes every 2 hours. Heat treatment includes hot packs or heat lamps.    Elevate your joint.  Elevation helps reduce swelling and pain. Raise your joint above the level of your heart as often as you can. Prop your painful joint on pillows to keep it above your heart comfortably.    Go to physical or occupational therapy as directed.  A physical therapist can teach you exercises to improve flexibility and range of motion. You may also be shown non-weight-bearing exercises that are safe for your joints, such as swimming. Exercise can help keep your joints flexible and reduce pain. An occupational therapist can help you learn to do your daily activities when your  joints are stiff or sore.    Maintain a healthy weight.  Extra weight puts increased pressure on your joints. Ask your healthcare provider what you should weigh. If you need to lose weight, he can help you create a weight loss program. Weight loss can help reduce pain and increase your ability to do your activities. The amount of exercise you do may vary each day, depending on your symptoms.    Wear flat or low-heeled shoes.  This will help decrease pain and reduce pressure on your ankle, knee, and hip joints.    Use support devices as directed.  You may be given splints to wear on your hands to help your joints rest and to decrease inflammation. While you sleep, use a pillow that is firm enough to support your neck and head.  Other equipment  that may help you move and prevent falls:  Orthotic shoes or insoles  help support your feet when you walk.    Crutches, a cane, or a walker  may help decrease your risk for falling. They also decrease stress on affected joints.     Devices to prevent falls  include raised toilet seats and bathtub bars to help you get up from sitting. Handrails can be placed in areas where you need balance and support.  Follow up with your healthcare provider or rheumatologist as directed:  Write down your questions so you remember to ask them during your visits.  © 2017 [employer] Information is for End User's use only and may not be sold, redistributed or otherwise used for commercial purposes. All illustrations and images included in CareNotes® are the copyrighted property of [employer], [employer]. or [employer].  The above information is an  only. It is not intended as medical advice for individual conditions or treatments. Talk to your doctor, nurse or pharmacist before following any medical regimen to see if it is safe and effective for you.

## 2025-03-24 NOTE — PROGRESS NOTES
Assessment/Plan:    Diagnoses and all orders for this visit:    Chronic pain of both knees  -     Large joint arthrocentesis: R knee    Primary osteoarthritis of both knees  -     Large joint arthrocentesis: R knee    Repeat R knee CSIs  Unable to take NSAIDs  Discussed knee brace or knee sleeve as well as formal physical therapy  Provided [employer] pamphlet  Also discussed referral to orthopedic joint replacement specialist  T/C Updated XRays    Return in about 3 months (around 6/24/2025). Or sooner for Left knee CSI      Subjective:   Patient ID: Chad Taylor is a 69 y.o. male.    Gene returns to the office for chronic b/l knee pain and OA requesting CSI.  He notes that his right knee has been causing him pain and discomfort denies significant issues with the left knee currently.  Last OV provided B/L Knee CSIs        Review of Systems    The following portions of the patient's chart were reviewed and updated as appropriate:   Allergy:    Allergies   Allergen Reactions    No Active Allergies        Medications:    Current Outpatient Medications:     Ascorbic Acid (vitamin C) 1000 MG tablet, Take 1,000 mg by mouth daily, Disp: , Rfl:     aspirin (Aspirin 81) 81 mg EC tablet, Take 1 tablet (81 mg total) by mouth daily, Disp: 90 tablet, Rfl: 3    Cholecalciferol 25 MCG (1000 UT) tablet, Take 1,000 Units by mouth daily, Disp: , Rfl:     clopidogrel (PLAVIX) 75 mg tablet, TAKE 1 TABLET BY MOUTH EVERY DAY, Disp: 90 tablet, Rfl: 1    Evolocumab (Repatha SureClick) 140 MG/ML SOAJ, INJECT 1 ML (140 MG) SUBCUTANEOUSLY EVERY 14 DAYS Strength: 140 MG/ML, Disp: 2 mL, Rfl: 5    fluticasone (FLONASE) 50 mcg/act nasal spray, SPRAY 1 SPRAY INTO EACH NOSTRIL EVERY DAY, Disp: 48 mL, Rfl: 1    loratadine (CLARITIN) 10 mg tablet, Take 10 mg by mouth daily , Disp: , Rfl:     Melatonin ER 10 MG TBCR, Take 10 mg by mouth As needed, Disp: , Rfl:     metoprolol succinate (TOPROL-XL) 25 mg 24 hr tablet, TAKE 1 TABLET BY MOUTH DAILY  WITH DINNER, Disp: 90 tablet, Rfl: 1    multivitamin-iron-minerals-folic acid (CENTRUM) chewable tablet, Chew 1 tablet daily, Disp: , Rfl:     naloxone (NARCAN) 4 mg/0.1 mL nasal spray, Administer 1 spray into a nostril. If no response after 2-3 minutes, give another dose in the other nostril using a new spray., Disp: 1 each, Rfl: 1    omega-3-acid ethyl esters (LOVAZA) 1 g capsule, TAKE 2 CAPSULES (2 G TOTAL) BY MOUTH IN THE MORNING AND 2 CAPSULES (2 G TOTAL) IN THE EVENING., Disp: 360 capsule, Rfl: 4    tadalafil (CIALIS) 20 MG tablet, Take 1 tablet (20 mg total) by mouth daily as needed for erectile dysfunction, Disp: 10 tablet, Rfl: 3    traMADol (ULTRAM) 50 mg tablet, Take 2 tablets (100 mg total) by mouth daily as needed for moderate pain, Disp: 60 tablet, Rfl: 1    fluorouracil (EFUDEX) 5 % cream, PLEASE SEE ATTACHED FOR DETAILED DIRECTIONS (Patient not taking: Reported on 12/12/2024), Disp: , Rfl:     Current Facility-Administered Medications:     ropivacaine (NAROPIN) injection 4 mL, 4 mL, Intra-articular, Titrated, , 4 mL at 07/09/24 0834    Patient Active Problem List   Diagnosis    Malignant neoplasm of prostate (HCC)    Abnormal glucose level    Primary osteoarthritis of both knees    Adenocarcinoma of prostate (HCC)    Arthropathy    Cardiomyopathy, ischemic    Chest pain    Chronic bilateral low back pain without sciatica    Essential hypertension    Hx pulmonary embolism    Hyperlipidemia    Primary osteoarthritis of both hands    Pulmonary embolism with infarction (HCC)    Sclerosing mesenteritis (HCC)    STEMI (ST elevation myocardial infarction) (HCC)    Toxic myopathy    Coronary artery disease involving native coronary artery of native heart without angina pectoris    Other male erectile dysfunction    BPH with urinary obstruction    Status post primary angioplasty with coronary stent    Urgency of urination    Bladder stone    HLD (hyperlipidemia)    Statin intolerance    Elevated PSA    Benign  "prostatic hyperplasia    History of bladder stone    Prediabetes    BPH (benign prostatic hyperplasia)    Chronic inflammatory arthritis       Objective:  Ht 6' 2.5\" (1.892 m)   Wt 97.5 kg (215 lb)   BMI 27.24 kg/m²     Right Knee Exam     Other   Erythema: absent  Swelling: mild  Effusion: effusion present          Observations     Right Knee   Positive for effusion.       Physical Exam  Musculoskeletal:      Right knee: Effusion present.           Neurologic Exam    Large joint arthrocentesis: R knee  Universal Protocol:  Consent: Verbal consent obtained.  Risks and benefits: risks, benefits and alternatives were discussed  Consent given by: patient  Time out: Immediately prior to procedure a \"time out\" was called to verify the correct patient, procedure, equipment, support staff and site/side marked as required.  Timeout called at: 3/24/2025 4:07 PM.  Patient understanding: patient states understanding of the procedure being performed  Test results: test results available and properly labeled  Site marked: the operative site was marked  Patient identity confirmed: verbally with patient  Supporting Documentation  Indications: pain   Procedure Details  Location: knee - R knee  Preparation: Patient was prepped and draped in the usual sterile fashion  Needle size: 22 G  Ultrasound guidance: no  Approach: anterolateral  Medications administered: 4 mL ropivacaine 0.2 %; 40 mg triamcinolone acetonide 40 mg/mL    Patient tolerance: patient tolerated the procedure well with no immediate complications  Dressing:  Sterile dressing applied    No erythema of knee(s)        I have personally reviewed the written report of the pertinent studies.             Past Medical History:   Diagnosis Date    Acute ST elevation myocardial infarction (STEMI) involving left anterior descending (LAD) coronary artery (HCC) 09/08/2016    Arthritis     low back and fingers    BCC (basal cell carcinoma of skin)     left shoulder and nose in " past    Bladder stone     BPH with urinary obstruction     Coronary artery disease      2 stents    Elevated PSA     Erectile dysfunction     Gout     History of colonoscopy 2018    History of DVT in adulthood     RLE    Hypercholesteremia     Hypertension     Malignant neoplasm prostate (HCC)     Prediabetes     Pulmonary embolism (HCC)     Right inguinal hernia     Sclerosing mesenteritis (HCC)     Seasonal allergies     Wears glasses        Past Surgical History:   Procedure Laterality Date    COLONOSCOPY      CORONARY STENT PLACEMENT      CYSTOSCOPY N/A 06/15/2021    Procedure: CYSTOSCOPY;  Surgeon: Dequan Barrientos MD;  Location: [facility];  Service: Urology    SD LITHOLAPAXY COMP/LG > 2.5 CM N/A 06/15/2021    Procedure: LITHOLOPAXY HOLMIUM LASER BLADDER STONE;  Surgeon: Dequan Barrientos MD;  Location: [facility];  Service: Urology    PROSTATE BIOPSY Bilateral 2011    PROSTATE SURGERY      TONSILLECTOMY      UMBILICAL HERNIA REPAIR         Social History     Socioeconomic History    Marital status: /Civil Union     Spouse name: Not on file    Number of children: Not on file    Years of education: Not on file    Highest education level: Not on file   Occupational History    Occupation: manager   Tobacco Use    Smoking status: Former     Current packs/day: 0.00     Average packs/day: 1 pack/day for 5.0 years (5.0 ttl pk-yrs)     Types: Cigarettes     Quit date:      Years since quittin.2    Smokeless tobacco: Never   Vaping Use    Vaping status: Never Used   Substance and Sexual Activity    Alcohol use: Not Currently     Alcohol/week: 1.0 standard drink of alcohol     Types: 1 Cans of beer per week     Comment: beer    Drug use: No    Sexual activity: Yes     Partners: Female     Birth control/protection: None   Other Topics Concern    Not on file   Social History Narrative    Not on file     Social Drivers of Health     Financial Resource Strain: Low Risk  (2023)    Overall Financial  Resource Strain (CARDIA)     Difficulty of Paying Living Expenses: Not very hard   Food Insecurity: Not on file   Transportation Needs: No Transportation Needs (12/12/2023)    PRAPARE - Transportation     Lack of Transportation (Medical): No     Lack of Transportation (Non-Medical): No   Physical Activity: Not on file   Stress: Not on file   Social Connections: Unknown (6/18/2024)    Received from [facility]    Social "[employer]     How often do you feel lonely or isolated from those around you? (Adult - for ages 18 years and over): Not on file   Intimate Partner Violence: Not At Risk (1/22/2025)    Received from [facility]    Humiliation, Afraid, Rape, and Kick questionnaire     Fear of Current or Ex-Partner: No     Emotionally Abused: No     Physically Abused: No     Sexually Abused: No   Housing Stability: Not on file       Family History   Problem Relation Age of Onset    Cancer Family         Bladder cancer    Diabetes Family     Heart attack Family     Hypertension Family     Heart attack Father     Gout Father     Cancer Father     Prostate cancer Father     Hypertension Father     Heart disease Mother     Diabetes Mother     Brain cancer Brother     Diabetes Brother     Diabetes Sister     Diabetes Sister     Diabetes Sister     Diabetes Sister

## 2025-05-30 ENCOUNTER — APPOINTMENT (OUTPATIENT)
Dept: LAB | Facility: MEDICAL CENTER | Age: 70
End: 2025-05-30
Payer: MEDICARE

## 2025-05-30 DIAGNOSIS — C61 ADENOCARCINOMA OF PROSTATE (HCC): ICD-10-CM

## 2025-05-30 DIAGNOSIS — R73.03 PREDIABETES: ICD-10-CM

## 2025-05-30 DIAGNOSIS — I26.99 PULMONARY EMBOLISM WITH INFARCTION (HCC): ICD-10-CM

## 2025-05-30 DIAGNOSIS — K65.4 SCLEROSING MESENTERITIS (HCC): ICD-10-CM

## 2025-05-30 DIAGNOSIS — I21.02 ST ELEVATION MYOCARDIAL INFARCTION INVOLVING LEFT ANTERIOR DESCENDING (LAD) CORONARY ARTERY (HCC): ICD-10-CM

## 2025-05-30 DIAGNOSIS — I25.10 CORONARY ARTERY DISEASE INVOLVING NATIVE CORONARY ARTERY OF NATIVE HEART WITHOUT ANGINA PECTORIS: ICD-10-CM

## 2025-05-30 DIAGNOSIS — I10 ESSENTIAL HYPERTENSION: ICD-10-CM

## 2025-05-30 DIAGNOSIS — I25.5 CARDIOMYOPATHY, ISCHEMIC: ICD-10-CM

## 2025-05-30 DIAGNOSIS — E78.2 MIXED HYPERLIPIDEMIA: ICD-10-CM

## 2025-05-31 ENCOUNTER — APPOINTMENT (OUTPATIENT)
Dept: LAB | Facility: MEDICAL CENTER | Age: 70
End: 2025-05-31
Payer: MEDICARE

## 2025-05-31 LAB
ALBUMIN SERPL BCG-MCNC: 4.2 G/DL (ref 3.5–5)
ALP SERPL-CCNC: 49 U/L (ref 34–104)
ALT SERPL W P-5'-P-CCNC: 14 U/L (ref 7–52)
ANION GAP SERPL CALCULATED.3IONS-SCNC: 8 MMOL/L (ref 4–13)
AST SERPL W P-5'-P-CCNC: 17 U/L (ref 13–39)
BASOPHILS # BLD AUTO: 0.05 THOUSANDS/ÂΜL (ref 0–0.1)
BASOPHILS NFR BLD AUTO: 1 % (ref 0–1)
BILIRUB SERPL-MCNC: 0.65 MG/DL (ref 0.2–1)
BUN SERPL-MCNC: 15 MG/DL (ref 5–25)
CALCIUM SERPL-MCNC: 9.6 MG/DL (ref 8.4–10.2)
CHLORIDE SERPL-SCNC: 107 MMOL/L (ref 96–108)
CHOLEST SERPL-MCNC: 144 MG/DL (ref ?–200)
CO2 SERPL-SCNC: 26 MMOL/L (ref 21–32)
CREAT SERPL-MCNC: 0.77 MG/DL (ref 0.6–1.3)
EOSINOPHIL # BLD AUTO: 0.17 THOUSAND/ÂΜL (ref 0–0.61)
EOSINOPHIL NFR BLD AUTO: 2 % (ref 0–6)
ERYTHROCYTE [DISTWIDTH] IN BLOOD BY AUTOMATED COUNT: 13.2 % (ref 11.6–15.1)
EST. AVERAGE GLUCOSE BLD GHB EST-MCNC: 123 MG/DL
GFR SERPL CREATININE-BSD FRML MDRD: 90 ML/MIN/1.73SQ M
GLUCOSE P FAST SERPL-MCNC: 105 MG/DL (ref 65–99)
HBA1C MFR BLD: 5.9 %
HCT VFR BLD AUTO: 43.2 % (ref 36.5–49.3)
HDLC SERPL-MCNC: 47 MG/DL
HGB BLD-MCNC: 13.9 G/DL (ref 12–17)
IMM GRANULOCYTES # BLD AUTO: 0.03 THOUSAND/UL (ref 0–0.2)
IMM GRANULOCYTES NFR BLD AUTO: 0 % (ref 0–2)
LDLC SERPL CALC-MCNC: 65 MG/DL (ref 0–100)
LYMPHOCYTES # BLD AUTO: 2.92 THOUSANDS/ÂΜL (ref 0.6–4.47)
LYMPHOCYTES NFR BLD AUTO: 35 % (ref 14–44)
MCH RBC QN AUTO: 30.6 PG (ref 26.8–34.3)
MCHC RBC AUTO-ENTMCNC: 32.2 G/DL (ref 31.4–37.4)
MCV RBC AUTO: 95 FL (ref 82–98)
MONOCYTES # BLD AUTO: 0.9 THOUSAND/ÂΜL (ref 0.17–1.22)
MONOCYTES NFR BLD AUTO: 11 % (ref 4–12)
NEUTROPHILS # BLD AUTO: 4.36 THOUSANDS/ÂΜL (ref 1.85–7.62)
NEUTS SEG NFR BLD AUTO: 51 % (ref 43–75)
NRBC BLD AUTO-RTO: 0 /100 WBCS
PLATELET # BLD AUTO: 291 THOUSANDS/UL (ref 149–390)
PMV BLD AUTO: 10.7 FL (ref 8.9–12.7)
POTASSIUM SERPL-SCNC: 4.4 MMOL/L (ref 3.5–5.3)
PROT SERPL-MCNC: 6.8 G/DL (ref 6.4–8.4)
RBC # BLD AUTO: 4.54 MILLION/UL (ref 3.88–5.62)
SODIUM SERPL-SCNC: 141 MMOL/L (ref 135–147)
TRIGL SERPL-MCNC: 162 MG/DL (ref ?–150)
TSH SERPL DL<=0.05 MIU/L-ACNC: 1.82 UIU/ML (ref 0.45–4.5)
WBC # BLD AUTO: 8.43 THOUSAND/UL (ref 4.31–10.16)

## 2025-05-31 PROCEDURE — 84402 ASSAY OF FREE TESTOSTERONE: CPT

## 2025-05-31 PROCEDURE — 84403 ASSAY OF TOTAL TESTOSTERONE: CPT

## 2025-05-31 PROCEDURE — 85025 COMPLETE CBC W/AUTO DIFF WBC: CPT

## 2025-05-31 PROCEDURE — 80053 COMPREHEN METABOLIC PANEL: CPT

## 2025-05-31 PROCEDURE — 36415 COLL VENOUS BLD VENIPUNCTURE: CPT

## 2025-05-31 PROCEDURE — 84443 ASSAY THYROID STIM HORMONE: CPT

## 2025-05-31 PROCEDURE — 80061 LIPID PANEL: CPT

## 2025-05-31 PROCEDURE — 83036 HEMOGLOBIN GLYCOSYLATED A1C: CPT

## 2025-06-03 LAB
TESTOST FREE SERPL-MCNC: 5.9 PG/ML (ref 6.6–18.1)
TESTOST SERPL-MCNC: 395 NG/DL (ref 264–916)

## 2025-06-05 ENCOUNTER — OFFICE VISIT (OUTPATIENT)
Age: 70
End: 2025-06-05
Payer: MEDICARE

## 2025-06-05 VITALS
DIASTOLIC BLOOD PRESSURE: 78 MMHG | TEMPERATURE: 98.5 F | HEIGHT: 75 IN | HEART RATE: 61 BPM | BODY MASS INDEX: 26.73 KG/M2 | WEIGHT: 215 LBS | OXYGEN SATURATION: 95 % | SYSTOLIC BLOOD PRESSURE: 120 MMHG | RESPIRATION RATE: 16 BRPM

## 2025-06-05 DIAGNOSIS — M17.0 PRIMARY OSTEOARTHRITIS OF BOTH KNEES: ICD-10-CM

## 2025-06-05 DIAGNOSIS — C61 ADENOCARCINOMA OF PROSTATE (HCC): ICD-10-CM

## 2025-06-05 DIAGNOSIS — E53.8 B12 DEFICIENCY: Primary | ICD-10-CM

## 2025-06-05 PROCEDURE — G2211 COMPLEX E/M VISIT ADD ON: HCPCS | Performed by: FAMILY MEDICINE

## 2025-06-05 PROCEDURE — 99214 OFFICE O/P EST MOD 30 MIN: CPT | Performed by: FAMILY MEDICINE

## 2025-06-05 RX ORDER — CYANOCOBALAMIN 1000 UG/ML
1000 INJECTION, SOLUTION INTRAMUSCULAR; SUBCUTANEOUS
Status: DISCONTINUED | OUTPATIENT
Start: 2025-06-05 | End: 2025-06-06

## 2025-06-05 NOTE — PROGRESS NOTES
"Name: Chad Taylor      : 1955      MRN: [id number]  Encounter Provider: Kenan Garcia MD  Encounter Date: 2025   Encounter department: [facility] PRIMARY [facility]ARE  :  Assessment & Plan  B12 deficiency  Continue meds.       Adenocarcinoma of prostate (HCC)  Continue follow-up with urology. Discussed supportive care and return parameters.        Primary osteoarthritis of both knees  Continue ortho. Follow-up. Discussed supportive care and return parameters.               History of Present Illness   Patient is a 71 y/o male who presents for follow-up on prostate CA (currently completing staging), b12 def, neuropathy and knee OA. No fevers chills nausea or vomiting.      Review of Systems   Constitutional: Negative.    HENT: Negative.     Eyes: Negative.    Respiratory: Negative.     Cardiovascular: Negative.    Gastrointestinal: Negative.    Endocrine: Negative.    Genitourinary: Negative.    Musculoskeletal:  Positive for arthralgias.   Allergic/Immunologic: Negative.    Neurological: Negative.    Hematological: Negative.    Psychiatric/Behavioral: Negative.     All other systems reviewed and are negative.      Objective   /78 (BP Location: Left arm, Patient Position: Sitting, Cuff Size: Large)   Pulse 61   Temp 98.5 °F (36.9 °C) (Temporal)   Resp 16   Ht 6' 3\" (1.905 m)   Wt 97.5 kg (215 lb)   SpO2 95%   BMI 26.87 kg/m²      Physical Exam  Vitals reviewed.   Constitutional:       General: He is not in acute distress.     Appearance: He is well-developed. He is not diaphoretic.   HENT:      Head: Normocephalic and atraumatic.      Right Ear: External ear normal.      Left Ear: External ear normal.      Nose: Nose normal.     Eyes:      General: No scleral icterus.        Right eye: No discharge.         Left eye: No discharge.      Conjunctiva/sclera: Conjunctivae normal.      Pupils: Pupils are equal, round, and reactive to light.     Neck:      Thyroid: No thyromegaly. "      Trachea: No tracheal deviation.     Cardiovascular:      Rate and Rhythm: Normal rate and regular rhythm.      Heart sounds: Normal heart sounds. No murmur heard.     No friction rub.   Pulmonary:      Effort: Pulmonary effort is normal. No respiratory distress.      Breath sounds: Normal breath sounds. No stridor. No wheezing or rales.   Abdominal:      General: There is no distension.      Palpations: Abdomen is soft. There is no mass.      Tenderness: There is no abdominal tenderness. There is no guarding or rebound.     Musculoskeletal:         General: Normal range of motion.      Cervical back: Normal range of motion and neck supple.   Lymphadenopathy:      Cervical: No cervical adenopathy.     Skin:     General: Skin is warm.     Neurological:      Mental Status: He is alert and oriented to person, place, and time.      Cranial Nerves: No cranial nerve deficit.     Psychiatric:         Behavior: Behavior normal.         Thought Content: Thought content normal.         Judgment: Judgment normal.

## 2025-07-09 ENCOUNTER — TELEPHONE (OUTPATIENT)
Age: 70
End: 2025-07-09

## 2025-07-09 NOTE — TELEPHONE ENCOUNTER
Patient called in inquiring about samples of  Evolocumab (Repatha SureClick) 140 MG/ML SOAJ  patient stated he left it in the car by mistake patient states he will get a new on from mail order pharmacy it might take 7-10 days which means he will miss a dose patient is requesting a call back at   [phone] Please Advise

## 2025-07-09 NOTE — TELEPHONE ENCOUNTER
Left message advising patient unfortunately we do not have any refills in the office. Instructed maybe he could contact mail order to see if there is any way they could over night the medication so he does not miss a dose.

## 2025-07-21 ENCOUNTER — TELEPHONE (OUTPATIENT)
Age: 70
End: 2025-07-21

## 2025-07-21 DIAGNOSIS — I21.02 ST ELEVATION MYOCARDIAL INFARCTION INVOLVING LEFT ANTERIOR DESCENDING (LAD) CORONARY ARTERY (HCC): ICD-10-CM

## 2025-07-21 RX ORDER — EVOLOCUMAB 140 MG/ML
INJECTION, SOLUTION SUBCUTANEOUS
Qty: 2 ML | Refills: 0 | Status: SHIPPED | OUTPATIENT
Start: 2025-07-21

## 2025-07-21 NOTE — TELEPHONE ENCOUNTER
Pt states that he left the repatha in his car on a hot day and was told not to use it, he states the pharmacy informed him he could get a replacement and they would help his with that they just need a new script sent by doctor to [facility], I cued up med to pharmacy pt requested please advise

## 2025-07-22 RX ORDER — EVOLOCUMAB 140 MG/ML
INJECTION, SOLUTION SUBCUTANEOUS
Qty: 2 ML | Refills: 5 | OUTPATIENT
Start: 2025-07-22

## 2025-07-30 ENCOUNTER — OFFICE VISIT (OUTPATIENT)
Dept: OBGYN CLINIC | Facility: MEDICAL CENTER | Age: 70
End: 2025-07-30
Payer: MEDICARE

## 2025-07-30 VITALS — HEIGHT: 75 IN | BODY MASS INDEX: 26.73 KG/M2 | WEIGHT: 215 LBS

## 2025-07-30 DIAGNOSIS — M25.561 CHRONIC PAIN OF BOTH KNEES: Primary | ICD-10-CM

## 2025-07-30 DIAGNOSIS — G89.29 CHRONIC PAIN OF BOTH KNEES: Primary | ICD-10-CM

## 2025-07-30 DIAGNOSIS — M17.0 PRIMARY OSTEOARTHRITIS OF BOTH KNEES: ICD-10-CM

## 2025-07-30 DIAGNOSIS — M25.562 CHRONIC PAIN OF BOTH KNEES: Primary | ICD-10-CM

## 2025-07-30 PROCEDURE — 99213 OFFICE O/P EST LOW 20 MIN: CPT | Performed by: EMERGENCY MEDICINE

## 2025-07-30 PROCEDURE — 20610 DRAIN/INJ JOINT/BURSA W/O US: CPT | Performed by: EMERGENCY MEDICINE

## 2025-07-30 RX ORDER — METHYLPREDNISOLONE ACETATE 40 MG/ML
4 INJECTION, SUSPENSION INTRA-ARTICULAR; INTRALESIONAL; INTRAMUSCULAR; SOFT TISSUE
Status: COMPLETED | OUTPATIENT
Start: 2025-07-30 | End: 2025-07-30

## 2025-07-30 RX ORDER — ROPIVACAINE HYDROCHLORIDE 2 MG/ML
4 INJECTION, SOLUTION EPIDURAL; INFILTRATION; PERINEURAL
Status: COMPLETED | OUTPATIENT
Start: 2025-07-30 | End: 2025-07-30

## 2025-07-30 RX ADMIN — METHYLPREDNISOLONE ACETATE 4 ML: 40 INJECTION, SUSPENSION INTRA-ARTICULAR; INTRALESIONAL; INTRAMUSCULAR; SOFT TISSUE at 11:15

## 2025-07-30 RX ADMIN — ROPIVACAINE HYDROCHLORIDE 4 ML: 2 INJECTION, SOLUTION EPIDURAL; INFILTRATION; PERINEURAL at 11:15

## (undated) DEVICE — PREMIUM DRY TRAY LF: Brand: MEDLINE INDUSTRIES, INC.

## (undated) DEVICE — STERILE SURGICAL LUBRICANT,  TUBE: Brand: SURGILUBE

## (undated) DEVICE — BAG URINE DRAINAGE 2000ML ANTI RFLX LF

## (undated) DEVICE — SCD SEQUENTIAL COMPRESSION COMFORT SLEEVE MEDIUM KNEE LENGTH: Brand: KENDALL SCD

## (undated) DEVICE — BASIC SINGLE BASIN-LF: Brand: MEDLINE INDUSTRIES, INC.

## (undated) DEVICE — UROCATCH BAG

## (undated) DEVICE — LASER HOLMUIUM FIBER 1000 MIC

## (undated) DEVICE — EVACUATOR BLADDER ELLIK DISP STRL

## (undated) DEVICE — INVIEW CLEAR LEGGINGS: Brand: CONVERTORS

## (undated) DEVICE — EXIDINE 4 PCT

## (undated) DEVICE — PACK TUR

## (undated) DEVICE — SINGLE PORT MANIFOLD: Brand: NEPTUNE 2

## (undated) DEVICE — TUBING SUCTION 5MM X 12 FT

## (undated) DEVICE — CATH FOLEY 22FR 5ML 2 WAY SILICONE ELASTIMER

## (undated) DEVICE — GLOVE SRG BIOGEL 7.5